# Patient Record
Sex: MALE | Race: WHITE | NOT HISPANIC OR LATINO
[De-identification: names, ages, dates, MRNs, and addresses within clinical notes are randomized per-mention and may not be internally consistent; named-entity substitution may affect disease eponyms.]

---

## 2020-02-27 ENCOUNTER — RESULT CHARGE (OUTPATIENT)
Age: 80
End: 2020-02-27

## 2020-02-27 ENCOUNTER — APPOINTMENT (OUTPATIENT)
Dept: UROLOGY | Facility: CLINIC | Age: 80
End: 2020-02-27
Payer: MEDICARE

## 2020-02-27 VITALS — BODY MASS INDEX: 34.07 KG/M2 | HEIGHT: 69 IN | WEIGHT: 230 LBS

## 2020-02-27 DIAGNOSIS — E78.5 HYPERLIPIDEMIA, UNSPECIFIED: ICD-10-CM

## 2020-02-27 DIAGNOSIS — Z82.3 FAMILY HISTORY OF STROKE: ICD-10-CM

## 2020-02-27 DIAGNOSIS — Z80.6 FAMILY HISTORY OF LEUKEMIA: ICD-10-CM

## 2020-02-27 DIAGNOSIS — I10 ESSENTIAL (PRIMARY) HYPERTENSION: ICD-10-CM

## 2020-02-27 DIAGNOSIS — Z78.9 OTHER SPECIFIED HEALTH STATUS: ICD-10-CM

## 2020-02-27 DIAGNOSIS — G40.909 EPILEPSY, UNSPECIFIED, NOT INTRACTABLE, W/OUT STATUS EPILEPTICUS: ICD-10-CM

## 2020-02-27 LAB
BILIRUB UR QL STRIP: NORMAL
CLARITY UR: CLEAR
COLLECTION METHOD: NORMAL
GLUCOSE UR-MCNC: NORMAL
HCG UR QL: NORMAL EU/DL
HGB UR QL STRIP.AUTO: NORMAL
KETONES UR-MCNC: NORMAL
LEUKOCYTE ESTERASE UR QL STRIP: NORMAL
NITRITE UR QL STRIP: NORMAL
PH UR STRIP: 5
PROT UR STRIP-MCNC: NORMAL
SP GR UR STRIP: 1.01

## 2020-02-27 PROCEDURE — 99204 OFFICE O/P NEW MOD 45 MIN: CPT

## 2020-02-27 RX ORDER — FUROSEMIDE 20 MG/1
20 TABLET ORAL
Refills: 0 | Status: ACTIVE | COMMUNITY

## 2020-05-06 ENCOUNTER — APPOINTMENT (OUTPATIENT)
Dept: UROLOGY | Facility: CLINIC | Age: 80
End: 2020-05-06

## 2020-06-03 ENCOUNTER — OUTPATIENT (OUTPATIENT)
Dept: OUTPATIENT SERVICES | Facility: HOSPITAL | Age: 80
LOS: 1 days | Discharge: HOME | End: 2020-06-03
Payer: MEDICARE

## 2020-06-03 VITALS
TEMPERATURE: 98 F | HEART RATE: 79 BPM | SYSTOLIC BLOOD PRESSURE: 145 MMHG | RESPIRATION RATE: 15 BRPM | DIASTOLIC BLOOD PRESSURE: 67 MMHG | WEIGHT: 232.37 LBS | HEIGHT: 67 IN | OXYGEN SATURATION: 97 %

## 2020-06-03 DIAGNOSIS — Z95.5 PRESENCE OF CORONARY ANGIOPLASTY IMPLANT AND GRAFT: Chronic | ICD-10-CM

## 2020-06-03 DIAGNOSIS — Z01.818 ENCOUNTER FOR OTHER PREPROCEDURAL EXAMINATION: ICD-10-CM

## 2020-06-03 LAB
A1C WITH ESTIMATED AVERAGE GLUCOSE RESULT: 6.2 % — HIGH (ref 4–5.6)
ALBUMIN SERPL ELPH-MCNC: 4.7 G/DL — SIGNIFICANT CHANGE UP (ref 3.5–5.2)
ALP SERPL-CCNC: 77 U/L — SIGNIFICANT CHANGE UP (ref 30–115)
ALT FLD-CCNC: 26 U/L — SIGNIFICANT CHANGE UP (ref 0–41)
ANION GAP SERPL CALC-SCNC: 13 MMOL/L — SIGNIFICANT CHANGE UP (ref 7–14)
APTT BLD: 30.6 SEC — SIGNIFICANT CHANGE UP (ref 27–39.2)
AST SERPL-CCNC: 24 U/L — SIGNIFICANT CHANGE UP (ref 0–41)
BASOPHILS # BLD AUTO: 0.06 K/UL — SIGNIFICANT CHANGE UP (ref 0–0.2)
BASOPHILS NFR BLD AUTO: 1.1 % — HIGH (ref 0–1)
BILIRUB SERPL-MCNC: 0.6 MG/DL — SIGNIFICANT CHANGE UP (ref 0.2–1.2)
BUN SERPL-MCNC: 25 MG/DL — HIGH (ref 10–20)
CALCIUM SERPL-MCNC: 9.2 MG/DL — SIGNIFICANT CHANGE UP (ref 8.5–10.1)
CHLORIDE SERPL-SCNC: 103 MMOL/L — SIGNIFICANT CHANGE UP (ref 98–110)
CO2 SERPL-SCNC: 27 MMOL/L — SIGNIFICANT CHANGE UP (ref 17–32)
CREAT SERPL-MCNC: 1.5 MG/DL — SIGNIFICANT CHANGE UP (ref 0.7–1.5)
EOSINOPHIL # BLD AUTO: 0.38 K/UL — SIGNIFICANT CHANGE UP (ref 0–0.7)
EOSINOPHIL NFR BLD AUTO: 6.7 % — SIGNIFICANT CHANGE UP (ref 0–8)
ESTIMATED AVERAGE GLUCOSE: 131 MG/DL — HIGH (ref 68–114)
GLUCOSE SERPL-MCNC: 186 MG/DL — HIGH (ref 70–99)
HCT VFR BLD CALC: 46.3 % — SIGNIFICANT CHANGE UP (ref 42–52)
HGB BLD-MCNC: 15.2 G/DL — SIGNIFICANT CHANGE UP (ref 14–18)
IMM GRANULOCYTES NFR BLD AUTO: 0.5 % — HIGH (ref 0.1–0.3)
INR BLD: 1.09 RATIO — SIGNIFICANT CHANGE UP (ref 0.65–1.3)
LYMPHOCYTES # BLD AUTO: 1.26 K/UL — SIGNIFICANT CHANGE UP (ref 1.2–3.4)
LYMPHOCYTES # BLD AUTO: 22.3 % — SIGNIFICANT CHANGE UP (ref 20.5–51.1)
MCHC RBC-ENTMCNC: 31.1 PG — HIGH (ref 27–31)
MCHC RBC-ENTMCNC: 32.8 G/DL — SIGNIFICANT CHANGE UP (ref 32–37)
MCV RBC AUTO: 94.9 FL — HIGH (ref 80–94)
MONOCYTES # BLD AUTO: 0.4 K/UL — SIGNIFICANT CHANGE UP (ref 0.1–0.6)
MONOCYTES NFR BLD AUTO: 7.1 % — SIGNIFICANT CHANGE UP (ref 1.7–9.3)
NEUTROPHILS # BLD AUTO: 3.51 K/UL — SIGNIFICANT CHANGE UP (ref 1.4–6.5)
NEUTROPHILS NFR BLD AUTO: 62.3 % — SIGNIFICANT CHANGE UP (ref 42.2–75.2)
NRBC # BLD: 0 /100 WBCS — SIGNIFICANT CHANGE UP (ref 0–0)
PLATELET # BLD AUTO: 90 K/UL — LOW (ref 130–400)
POTASSIUM SERPL-MCNC: 4.3 MMOL/L — SIGNIFICANT CHANGE UP (ref 3.5–5)
POTASSIUM SERPL-SCNC: 4.3 MMOL/L — SIGNIFICANT CHANGE UP (ref 3.5–5)
PROT SERPL-MCNC: 6.9 G/DL — SIGNIFICANT CHANGE UP (ref 6–8)
PROTHROM AB SERPL-ACNC: 12.5 SEC — SIGNIFICANT CHANGE UP (ref 9.95–12.87)
RBC # BLD: 4.88 M/UL — SIGNIFICANT CHANGE UP (ref 4.7–6.1)
RBC # FLD: 13.1 % — SIGNIFICANT CHANGE UP (ref 11.5–14.5)
SODIUM SERPL-SCNC: 143 MMOL/L — SIGNIFICANT CHANGE UP (ref 135–146)
WBC # BLD: 5.64 K/UL — SIGNIFICANT CHANGE UP (ref 4.8–10.8)
WBC # FLD AUTO: 5.64 K/UL — SIGNIFICANT CHANGE UP (ref 4.8–10.8)

## 2020-06-03 PROCEDURE — 93010 ELECTROCARDIOGRAM REPORT: CPT

## 2020-06-03 NOTE — H&P PST ADULT - RS GEN PE MLT RESP DETAILS PC
airway patent/clear to auscultation bilaterally/no chest wall tenderness/breath sounds equal/normal/respirations non-labored/good air movement

## 2020-06-03 NOTE — H&P PST ADULT - NSICDXPASTMEDICALHX_GEN_ALL_CORE_FT
PAST MEDICAL HISTORY:  Cataract     DM (diabetes mellitus)     GAURAV on CPAP PAST MEDICAL HISTORY:  Cataract     DM (diabetes mellitus)     HTN (hypertension)     GAURAV on CPAP     Seizure x1  2/2017

## 2020-06-03 NOTE — H&P PST ADULT - REASON FOR ADMISSION
PT PRESENTS TO PAST WITH NO SOB, CP, PALPITATIONS, DYSURIA, UTI OR URI AT PRESENT.   PT ABLE TO WALK UP 2-3 FLIGHTS OF STEPS WITH NO SOB.  AS PER THE PT, THIS IS HIS/HER COMPLETE MEDICAL AND SURGICAL HX, INCLUDING MEDICATIONS PRESCRIBED AND OVER THE COUNTER  PT SCHEDULED FOR A LEFT HEART CATHETERIZATION.  PT STATES- I HAD AN ABNORMAL EKG. PT PRESENTS TO PAST WITH NO SOB, PALPITATIONS, DYSURIA, UTI OR URI AT PRESENT.   PT ABLE TO WALK UP 2-3 FLIGHTS OF STEPS WITH NO SOB.  AS PER THE PT, he has occasional chest pressure on walking up hill.  Had stress test with multiple segment reversible defect and scheduled for cardiac cath.  Had LAD stent in 2015.THIS IS HIS/HER COMPLETE MEDICAL AND SURGICAL HX, INCLUDING MEDICATIONS PRESCRIBED AND OVER THE COUNTER  PT SCHEDULED FOR A LEFT HEART CATHETERIZATION.  PT STATES- I HAD AN ABNORMAL EKG.

## 2020-06-03 NOTE — H&P PST ADULT - ATTENDING COMMENTS
Chart reviewed, pt examined.  Pt with CAD and CCS 2 angina and intermediate risk stress MPI on 2 anti-ischemic meds.   For cath / possible PCI.  risks, benefits and alternatives discussed.  Pt voices understanding and agrees.

## 2020-06-17 ENCOUNTER — OUTPATIENT (OUTPATIENT)
Dept: OUTPATIENT SERVICES | Facility: HOSPITAL | Age: 80
LOS: 1 days | Discharge: HOME | End: 2020-06-17
Payer: MEDICARE

## 2020-06-17 VITALS — HEIGHT: 67 IN | WEIGHT: 231.93 LBS

## 2020-06-17 DIAGNOSIS — Z95.5 PRESENCE OF CORONARY ANGIOPLASTY IMPLANT AND GRAFT: Chronic | ICD-10-CM

## 2020-06-17 LAB
ANION GAP SERPL CALC-SCNC: 16 MMOL/L — HIGH (ref 7–14)
BUN SERPL-MCNC: 25 MG/DL — HIGH (ref 10–20)
CALCIUM SERPL-MCNC: 9 MG/DL — SIGNIFICANT CHANGE UP (ref 8.5–10.1)
CHLORIDE SERPL-SCNC: 103 MMOL/L — SIGNIFICANT CHANGE UP (ref 98–110)
CO2 SERPL-SCNC: 25 MMOL/L — SIGNIFICANT CHANGE UP (ref 17–32)
CREAT SERPL-MCNC: 1.4 MG/DL — SIGNIFICANT CHANGE UP (ref 0.7–1.5)
GLUCOSE BLDC GLUCOMTR-MCNC: 105 MG/DL — HIGH (ref 70–99)
GLUCOSE SERPL-MCNC: 163 MG/DL — HIGH (ref 70–99)
HCT VFR BLD CALC: 46.2 % — SIGNIFICANT CHANGE UP (ref 42–52)
HGB BLD-MCNC: 15.2 G/DL — SIGNIFICANT CHANGE UP (ref 14–18)
MCHC RBC-ENTMCNC: 31.1 PG — HIGH (ref 27–31)
MCHC RBC-ENTMCNC: 32.9 G/DL — SIGNIFICANT CHANGE UP (ref 32–37)
MCV RBC AUTO: 94.5 FL — HIGH (ref 80–94)
NRBC # BLD: 0 /100 WBCS — SIGNIFICANT CHANGE UP (ref 0–0)
PLATELET # BLD AUTO: 84 K/UL — LOW (ref 130–400)
POTASSIUM SERPL-MCNC: 4.2 MMOL/L — SIGNIFICANT CHANGE UP (ref 3.5–5)
POTASSIUM SERPL-SCNC: 4.2 MMOL/L — SIGNIFICANT CHANGE UP (ref 3.5–5)
RBC # BLD: 4.89 M/UL — SIGNIFICANT CHANGE UP (ref 4.7–6.1)
RBC # FLD: 13 % — SIGNIFICANT CHANGE UP (ref 11.5–14.5)
SODIUM SERPL-SCNC: 144 MMOL/L — SIGNIFICANT CHANGE UP (ref 135–146)
WBC # BLD: 5.22 K/UL — SIGNIFICANT CHANGE UP (ref 4.8–10.8)
WBC # FLD AUTO: 5.22 K/UL — SIGNIFICANT CHANGE UP (ref 4.8–10.8)

## 2020-06-17 PROCEDURE — 93010 ELECTROCARDIOGRAM REPORT: CPT

## 2020-06-17 RX ORDER — ATORVASTATIN CALCIUM 80 MG/1
1 TABLET, FILM COATED ORAL
Qty: 30 | Refills: 1
Start: 2020-06-17 | End: 2020-08-15

## 2020-06-17 RX ORDER — CLOPIDOGREL BISULFATE 75 MG/1
1 TABLET, FILM COATED ORAL
Qty: 0 | Refills: 0 | DISCHARGE

## 2020-06-17 RX ORDER — SIMVASTATIN 20 MG/1
1 TABLET, FILM COATED ORAL
Qty: 0 | Refills: 0 | DISCHARGE

## 2020-06-17 RX ORDER — TICAGRELOR 90 MG/1
1 TABLET ORAL
Qty: 60 | Refills: 1
Start: 2020-06-17 | End: 2020-08-15

## 2020-06-17 NOTE — PROGRESS NOTE ADULT - SUBJECTIVE AND OBJECTIVE BOX
Cardiology Follow up    RAHAT MARTINEZ   80y Male  PAST MEDICAL & SURGICAL HISTORY:  Seizure: x1  2/2017  HTN (hypertension)  Cataract  DM (diabetes mellitus)  GAURAV on CPAP  H/O heart artery stent: x1       HPI:    Allergies    No Known Allergies    Intolerances      Patient without complaints.  Denies CP, SOB, palpitations, or dizziness  No events on telemetry     REVIEW OF SYSTEMS:          CONSTITUTIONAL: No weakness, fevers or chills          EYES/ENT: No visual changes;  No vertigo or throat pain           NECK: No pain or stiffness          RESPIRATORY: No cough, wheezing, hemoptysis          CARDIOVASCULAR: no pain, no COLBY, no palpitations           GASTROINTESTINAL: No abdominal or epigastric pain. No nausea, vomiting, or hematemesis;           GENITOURINARY: No dysuria, frequency or hematuria          NEUROLOGICAL: No numbness or weakness          SKIN: No itching, rashes    PHYSICAL EXAM:           CONSTITUTIONAL: Well-developed; well-nourished; in no acute distress  	SKIN: warm, dry  	HEAD: Normocephalic; atraumatic  	EYES: PERRL.  	ENT: No nasal discharge, airway clear, mucous membranes moist  	NECK: Supple; non tender.  	CARD: +S1, +S2, no murmurs, gallops, or rubs. (Regular) rate and rhythm    	RESP: No wheezes, rales or rhonchi. CTA B/L  	ABD: soft ntnd, + BS x 4 quadrants  	EXT: moves all extremities,  no clubbing, cyanosis or edema  	NEURO: Alert and oriented x3, no focal deficits          PSYCH: Cooperative, appropriate          VASCULAR:  + Rad / + PTs / + DPs          EXTREMITY:  	   Left Radial: D-stat in place, + pulses, , access site soft, no hematoma, no pain, no numbness, no signs and symptoms of infection             ECG: P @ 1700                                                                                                                LABS: P @ 1700      A/P:  I discussed the case with Cardiologist Dr. Mcmahon and recommend the following:    S/P PCI dLCX  	     Continue DAPT (asa 81mg daily, brilinta 90 mg q12),  B-Blocker, Statin Therapy                   pt instructed to stop plavix and will be replaced with brilinta 90mg q12                   brilinta coverage confirmed, $47/month, pt aware and agreeable, RX available for pick-up                   monitor access site                    CBC/BMP/EKG @ 1700                   NS 150cc/hr x 6 hrs                    Patient agreeing to take DAPT for at least one year or as directed by cardiologist                    Pt given instructions on importance of taking antiplatelet medication or risk acute stent thrombosis/death                   Post cath instructions, access site care and activity restrictions reviewed with patient                     Discussed with patient to return to hospital if experience chest pain, shortness breath, dizziness and site bleeding                   Aggressive risk factor modification, diet counseling, smoking cessation discussed with patient                       Can discharge patient from cardiac standpoint @ 1900 if labs/ekg/site wnl and ambulating without symptoms                    Follow up with Cardiology Dr. Mcmahon  in 1-2 weeks.  Instructed to call and make an appointment Cardiology Follow up    RAHAT MARTINEZ   80y Male  PAST MEDICAL & SURGICAL HISTORY:  Seizure: x1  2/2017  HTN (hypertension)  Cataract  DM (diabetes mellitus)  GAURAV on CPAP  H/O heart artery stent: x1       HPI:    Allergies    No Known Allergies    Intolerances      Patient without complaints.  Denies CP, SOB, palpitations, or dizziness  No events on telemetry     REVIEW OF SYSTEMS:          CONSTITUTIONAL: No weakness, fevers or chills          EYES/ENT: No visual changes;  No vertigo or throat pain           NECK: No pain or stiffness          RESPIRATORY: No cough, wheezing, hemoptysis          CARDIOVASCULAR: no pain, no COLBY, no palpitations           GASTROINTESTINAL: No abdominal or epigastric pain. No nausea, vomiting, or hematemesis;           GENITOURINARY: No dysuria, frequency or hematuria          NEUROLOGICAL: No numbness or weakness          SKIN: No itching, rashes    PHYSICAL EXAM:           CONSTITUTIONAL: Well-developed; well-nourished; in no acute distress  	SKIN: warm, dry  	HEAD: Normocephalic; atraumatic  	EYES: PERRL.  	ENT: No nasal discharge, airway clear, mucous membranes moist  	NECK: Supple; non tender.  	CARD: +S1, +S2, no murmurs, gallops, or rubs. (Regular) rate and rhythm    	RESP: No wheezes, rales or rhonchi. CTA B/L  	ABD: soft ntnd, + BS x 4 quadrants  	EXT: moves all extremities,  no clubbing, cyanosis or edema  	NEURO: Alert and oriented x3, no focal deficits          PSYCH: Cooperative, appropriate          VASCULAR:  + Rad / + PTs / + DPs          EXTREMITY:  	   Left Radial: D-stat in place, + pulses, , access site soft, no hematoma, no pain, no numbness, no signs and symptoms of infection             ECG: P @ 1700                                                                                                                LABS: P @ 1700      A/P:  I discussed the case with Cardiologist Dr. Mcmahon and recommend the following:    S/P PCI dLCX  	     Continue DAPT (asa 81mg daily, brilinta 90 mg q12),  B-Blocker, Statin Therapy                   no ACE due to elevated Cr                   pt instructed to stop plavix and will be replaced with brilinta 90mg q12                   brilinta coverage confirmed, $47/month, pt aware and agreeable, RX available for pick-up                   monitor access site                    CBC/BMP/EKG @ 1700                   NS 150cc/hr x 6 hrs                    Patient agreeing to take DAPT for at least one year or as directed by cardiologist                    Pt given instructions on importance of taking antiplatelet medication or risk acute stent thrombosis/death                   Post cath instructions, access site care and activity restrictions reviewed with patient                     Discussed with patient to return to hospital if experience chest pain, shortness breath, dizziness and site bleeding                   Aggressive risk factor modification, diet counseling, smoking cessation discussed with patient                       Can discharge patient from cardiac standpoint @ 1900 if labs/ekg/site wnl and ambulating without symptoms                    Follow up with Cardiology Dr. Mcmahon  in 1-2 weeks.  Instructed to call and make an appointment Cardiology Follow up    RAHAT MARTINEZ   80y Male  PAST MEDICAL & SURGICAL HISTORY:  Seizure: x1  2/2017  HTN (hypertension)  Cataract  DM (diabetes mellitus)  GAURAV on CPAP  H/O heart artery stent: x1       HPI:    Allergies    No Known Allergies    Intolerances      Patient without complaints.  Denies CP, SOB, palpitations, or dizziness  No events on telemetry     REVIEW OF SYSTEMS:          CONSTITUTIONAL: No weakness, fevers or chills          EYES/ENT: No visual changes;  No vertigo or throat pain           NECK: No pain or stiffness          RESPIRATORY: No cough, wheezing, hemoptysis          CARDIOVASCULAR: no pain, no COLBY, no palpitations           GASTROINTESTINAL: No abdominal or epigastric pain. No nausea, vomiting, or hematemesis;           GENITOURINARY: No dysuria, frequency or hematuria          NEUROLOGICAL: No numbness or weakness          SKIN: No itching, rashes    PHYSICAL EXAM:           CONSTITUTIONAL: Well-developed; well-nourished; in no acute distress  	SKIN: warm, dry  	HEAD: Normocephalic; atraumatic  	EYES: PERRL.  	ENT: No nasal discharge, airway clear, mucous membranes moist  	NECK: Supple; non tender.  	CARD: +S1, +S2, no murmurs, gallops, or rubs. (Regular) rate and rhythm    	RESP: No wheezes, rales or rhonchi. CTA B/L  	ABD: soft ntnd, + BS x 4 quadrants  	EXT: moves all extremities,  no clubbing, cyanosis or edema  	NEURO: Alert and oriented x3, no focal deficits          PSYCH: Cooperative, appropriate          VASCULAR:  + Rad / + PTs / + DPs          EXTREMITY:  	   Left Radial: D-stat in place, + pulses, , access site soft, no hematoma, no pain, no numbness, no signs and symptoms of infection             ECG: P @ 1700                                                                                                                LABS: P @ 1700      A/P:  I discussed the case with Cardiologist Dr. Mcmahon and recommend the following:    S/P PCI dLCX  	     Continue DAPT (asa 81mg daily, brilinta 90 mg q12),  B-Blocker, Statin Therapy                   no ACE due to elevated Cr, reevaluate as outpt                   pt instructed to stop plavix and will be replaced with brilinta 90mg q12                   brilinta coverage confirmed, $47/month, pt aware and agreeable, RX available for pick-up                   monitor access site                    CBC/BMP/EKG @ 1700                   NS 150cc/hr x 6 hrs                    Patient agreeing to take DAPT for at least one year or as directed by cardiologist                    Pt given instructions on importance of taking antiplatelet medication or risk acute stent thrombosis/death                   Post cath instructions, access site care and activity restrictions reviewed with patient                     Discussed with patient to return to hospital if experience chest pain, shortness breath, dizziness and site bleeding                   Aggressive risk factor modification, diet counseling, smoking cessation discussed with patient                       Can discharge patient from cardiac standpoint @ 1900 if labs/ekg/site wnl and ambulating without symptoms                    Follow up with Cardiology Dr. Mcmahon  in 1-2 weeks.  Instructed to call and make an appointment Cardiology Follow up    RAHAT MARTINEZ   80y Male with CCS 2 angin and intermediate risk stress test, s/p cath and PCI of distal Circumflex.  PAST MEDICAL & SURGICAL HISTORY:  Seizure: x1  2/2017  HTN (hypertension)  Cataract  DM (diabetes mellitus)  GAURAV on CPAP  H/O heart artery stent: x1       HPI:    Allergies    No Known Allergies    Intolerances      Patient without complaints.  Denies CP, SOB, palpitations, or dizziness  No events on telemetry     REVIEW OF SYSTEMS:          CONSTITUTIONAL: No weakness, fevers or chills          EYES/ENT: No visual changes;  No vertigo or throat pain           NECK: No pain or stiffness          RESPIRATORY: No cough, wheezing, hemoptysis          CARDIOVASCULAR: no pain, no COLBY, no palpitations           GASTROINTESTINAL: No abdominal or epigastric pain. No nausea, vomiting, or hematemesis;           GENITOURINARY: No dysuria, frequency or hematuria          NEUROLOGICAL: No numbness or weakness          SKIN: No itching, rashes    PHYSICAL EXAM:           CONSTITUTIONAL: Well-developed; well-nourished; in no acute distress  	SKIN: warm, dry  	HEAD: Normocephalic; atraumatic  	EYES: PERRL.  	ENT: No nasal discharge, airway clear, mucous membranes moist  	NECK: Supple; non tender.  	CARD: +S1, +S2, no murmurs, gallops, or rubs. (Regular) rate and rhythm    	RESP: No wheezes, rales or rhonchi. CTA B/L  	ABD: soft ntnd, + BS x 4 quadrants  	EXT: moves all extremities,  no clubbing, cyanosis or edema  	NEURO: Alert and oriented x3, no focal deficits          PSYCH: Cooperative, appropriate          VASCULAR:  + Rad / + PTs / + DPs          EXTREMITY:  	   Left Radial: D-stat in place, + pulses, , access site soft, no hematoma, no pain, no numbness, no signs and symptoms of infection             ECG: P @ 1700                                                                                                                LABS: P @ 1700      A/P:  I discussed the case with Cardiologist Dr. Mcmahon and recommend the following:    S/P PCI dLCX  	     Continue DAPT (asa 81mg daily, brilinta 90 mg q12),  B-Blocker, Statin Therapy                   no ACE due to elevated Cr, reevaluate as outpt                   pt instructed to stop plavix and will be replaced with brilinta 90mg q12                   brilinta coverage confirmed, $47/month, pt aware and agreeable, RX available for pick-up                   monitor access site                    CBC/BMP/EKG @ 1700                   NS 150cc/hr x 6 hrs                    Patient agreeing to take DAPT for at least one year or as directed by cardiologist                    Pt given instructions on importance of taking antiplatelet medication or risk acute stent thrombosis/death                   Post cath instructions, access site care and activity restrictions reviewed with patient                     Discussed with patient to return to hospital if experience chest pain, shortness breath, dizziness and site bleeding                   Aggressive risk factor modification, diet counseling, smoking cessation discussed with patient                       Can discharge patient from cardiac standpoint @ 1900 if labs/ekg/site wnl and ambulating without symptoms                    Follow up with Cardiology Dr. Murphy  in 1-2 weeks.  Instructed to call and make an appointment

## 2020-06-17 NOTE — CHART NOTE - NSCHARTNOTEFT_GEN_A_CORE
Preliminary Cardiac Catheterization Post-Procedure Report:06-17-20 @ 13:27    Procedure Performed:  [x] Left Heart Catheterization  [ ] Right Heart Catheterization  [x] Percutaneous Coronary Intervention    Primary Physician: Dr. Salome MD  Assistant(s): Dr. Hayes MD and Dr. Chas MD    Preliminary Procedure Summary (Official full report to follow)    Pre-procedure diagnosis: Stable Angina, Abnormal Stress test  Post Procedure Diagnosis/Impression:    Left Heart Catheterization:  approximate EF%: normal on 2d echo  [ ] Normal Coronary Arteries  [ ] Luminal Irregularities  [ ] non-obstructive CAD  [ ] ** vessel coronary artery disease       Anesthesia Type  [x] conscious sedation  [x] local/regional anesthesia  [  ] general anesthesia    Estimated Blood Loss  [x ] less than 30 ml    Amount of Contrast used: 200 ml (Visipaque)    Access  [ ] Rt. Femoral A  [ ] Rt. Femoral V  [ ] Rt. Radial A  [ ] Rt. Brachial V  [x] Lt. Radial A (D-stat)    Condition of patient after procedure  [x] stable  [  ] guarded  [  ] satisfactory     CATH SUMMARY/FINDINGS:    Dominance:   [ ] Right  [x] Left                  LM:     large, minor irregularities    LAD:                        prox LAD: 50% stenosis, patent stent  mid LAD: moderate atherosclerosis  distal LAD: small vessel, 50% stenosis near the origin of D2    Diag:   D1: moderate atherosclerosis  D2: minor irregularities    LCx: large dominant, mildly tortuous  prox LCX: 40% eccentric, ulcerated lesion  distal LCX: 95% tubular stenosis, culprit for abnormal stress test, s/p PCI    OM1: mild atherosclerosis  OM2: normal  OM3: normal    RCA: small, non-dominant, mild atherosclerosis    LPDA: normal    Implants: PCI with balloon angioplasty and MEHDI x 1 (4.0x18mm [place]) to distal LCX    Follow-up Care:  [x] D/C Home today  [x] ASA 81mg, Toprol 25mg XL, and start Brilinta 90mg Q12, and lipitor 40mg QHS  [x ] intensive medical management  Monitor BUN/Cr  NS@150cc/hr x 6 hrs  CMP/CBC in 4-6 hours  F/U with Cardiologist as outpatient Preliminary Cardiac Catheterization Post-Procedure Report:06-17-20 @ 13:27    Procedure Performed:  [x] Left Heart Catheterization  [x] Percutaneous Coronary Intervention    Primary Physician: Dr. Alan Mcmahon MD  Assistant(s): Dr. Hayes MD and Dr. Chas MD    Preliminary Procedure Summary (Official full report to follow)    Pre-procedure diagnosis: Stable Angina, Abnormal Stress test  Post Procedure Diagnosis/Impression:    Left Heart Catheterization:  approximate EF%: normal on echo  [2 ]  vessel coronary artery disease       Anesthesia Type  [x] conscious sedation  [x] local/regional anesthesia    Estimated Blood Loss  [x ] less than 10 ml    Amount of Contrast used: 200 ml (Visipaque)    Access    [x] Lt. Radial A (Hemostasis achieved with D-stat)    Condition of patient after procedure  [x] stable    CATH SUMMARY/FINDINGS:    Dominance:   [ ] Right  [x] Left                  LM:     large, mild disease    LAD:                        prox LAD: 50% ostial stenosis, patent proximal stent  mid LAD: moderate atherosclerosis  distal LAD: small vessel, 60% stenosis at  the origin of D2    Diag:   D1: moderate atherosclerosis  D2: minor irregularities    LCx: large dominant, mildly tortuous  prox LCX: 40% eccentric,  lesion  distal LCX: 95% tubular stenosis, culprit for abnormal stress test,    OM1: large, mild atherosclerosis  OM2: small, normal  OM3: normal, medium size    RCA: small, non-dominant, mild atherosclerosis    LPDA: small, mild disease.    Implants: PCI with balloon angioplasty and MEHDI x 1 (4.0x18mm [place]) to distal LCX, post dilated with 4.0/15 NC at 16 [place].  90 -->0%    No complications.    Follow-up Care:  [x] D/C Home today  [x] ASA 81mg, Toprol 25mg XL, and start Brilinta 90mg Q12, and Lipitor 40mg QHS.  Discontinue Clopidogrel.  [x ] intensive medical management  Monitor BUN/Cr  NS@150cc/hr x 6 hrs  CMP/CBC in 4-6 hours  F/U with Dr Murphy as outpatient in 1 week.

## 2020-06-23 DIAGNOSIS — Z79.02 LONG TERM (CURRENT) USE OF ANTITHROMBOTICS/ANTIPLATELETS: ICD-10-CM

## 2020-06-23 DIAGNOSIS — Z95.5 PRESENCE OF CORONARY ANGIOPLASTY IMPLANT AND GRAFT: ICD-10-CM

## 2020-06-23 DIAGNOSIS — Z87.891 PERSONAL HISTORY OF NICOTINE DEPENDENCE: ICD-10-CM

## 2020-06-23 DIAGNOSIS — G47.33 OBSTRUCTIVE SLEEP APNEA (ADULT) (PEDIATRIC): ICD-10-CM

## 2020-06-23 DIAGNOSIS — N19 UNSPECIFIED KIDNEY FAILURE: ICD-10-CM

## 2020-06-23 DIAGNOSIS — I10 ESSENTIAL (PRIMARY) HYPERTENSION: ICD-10-CM

## 2020-06-23 DIAGNOSIS — R94.39 ABNORMAL RESULT OF OTHER CARDIOVASCULAR FUNCTION STUDY: ICD-10-CM

## 2020-06-23 DIAGNOSIS — R56.9 UNSPECIFIED CONVULSIONS: ICD-10-CM

## 2020-06-23 DIAGNOSIS — I25.118 ATHEROSCLEROTIC HEART DISEASE OF NATIVE CORONARY ARTERY WITH OTHER FORMS OF ANGINA PECTORIS: ICD-10-CM

## 2020-06-23 DIAGNOSIS — E11.9 TYPE 2 DIABETES MELLITUS WITHOUT COMPLICATIONS: ICD-10-CM

## 2020-08-18 ENCOUNTER — APPOINTMENT (OUTPATIENT)
Dept: UROLOGY | Facility: CLINIC | Age: 80
End: 2020-08-18
Payer: MEDICARE

## 2020-08-18 VITALS — HEIGHT: 69 IN | WEIGHT: 230 LBS | TEMPERATURE: 98.3 F | BODY MASS INDEX: 34.07 KG/M2

## 2020-08-18 DIAGNOSIS — N20.0 CALCULUS OF KIDNEY: ICD-10-CM

## 2020-08-18 PROBLEM — I10 ESSENTIAL (PRIMARY) HYPERTENSION: Chronic | Status: ACTIVE | Noted: 2020-06-03

## 2020-08-18 PROBLEM — H26.9 UNSPECIFIED CATARACT: Chronic | Status: ACTIVE | Noted: 2020-06-03

## 2020-08-18 PROBLEM — G47.33 OBSTRUCTIVE SLEEP APNEA (ADULT) (PEDIATRIC): Chronic | Status: ACTIVE | Noted: 2020-06-03

## 2020-08-18 PROBLEM — R56.9 UNSPECIFIED CONVULSIONS: Chronic | Status: ACTIVE | Noted: 2020-06-03

## 2020-08-18 PROBLEM — E11.9 TYPE 2 DIABETES MELLITUS WITHOUT COMPLICATIONS: Chronic | Status: ACTIVE | Noted: 2020-06-03

## 2020-08-18 PROCEDURE — 81003 URINALYSIS AUTO W/O SCOPE: CPT | Mod: QW

## 2020-08-18 PROCEDURE — 99213 OFFICE O/P EST LOW 20 MIN: CPT

## 2020-08-18 RX ORDER — CLOPIDOGREL BISULFATE 75 MG/1
75 TABLET, FILM COATED ORAL
Refills: 0 | Status: DISCONTINUED | COMMUNITY
End: 2020-08-18

## 2020-08-18 RX ORDER — ALLOPURINOL 300 MG/1
300 TABLET ORAL
Qty: 90 | Refills: 0 | Status: ACTIVE | COMMUNITY
Start: 2020-08-16

## 2020-08-18 RX ORDER — AMLODIPINE BESYLATE 2.5 MG/1
2.5 TABLET ORAL
Qty: 90 | Refills: 0 | Status: ACTIVE | COMMUNITY
Start: 2020-06-30

## 2020-08-18 NOTE — PHYSICAL EXAM
[General Appearance - Well Developed] : well developed [General Appearance - Well Nourished] : well nourished [Normal Appearance] : normal appearance [Well Groomed] : well groomed [General Appearance - In No Acute Distress] : no acute distress [Abdomen Soft] : soft [Abdomen Tenderness] : non-tender [Costovertebral Angle Tenderness] : no ~M costovertebral angle tenderness [Urethral Meatus] : meatus normal [Urinary Bladder Findings] : the bladder was normal on palpation [Scrotum] : the scrotum was normal [Testes Mass (___cm)] : there were no testicular masses [No Prostate Nodules] : no prostate nodules [Prostate Tenderness] : the prostate was not tender [Prostate Size ___ gm] : prostate size [unfilled] gm [Skin Color & Pigmentation] : normal skin color and pigmentation [] : no respiratory distress [Edema] : no peripheral edema [Respiration, Rhythm And Depth] : normal respiratory rhythm and effort [Exaggerated Use Of Accessory Muscles For Inspiration] : no accessory muscle use [Oriented To Time, Place, And Person] : oriented to person, place, and time [Mood] : the mood was normal [Affect] : the affect was normal [Not Anxious] : not anxious [No Focal Deficits] : no focal deficits [Normal Station and Gait] : the gait and station were normal for the patient's age [No Palpable Adenopathy] : no palpable adenopathy [FreeTextEntry1] : sulcus ++ -- cannot reach base

## 2020-08-18 NOTE — ASSESSMENT
[FreeTextEntry1] : #1. Elevated PSA\par #2. BPH, clinically\par #3. Nephrolithiasis--\par \par Plan\par Psa 6 months \par Strongly advised TRUS BX/TP biopsy -- understanding and increased risk for the presence of prosthetic carcinoma based on his increasing PSA values. Patient clearly understands this. However he is anxious about telling his wife at this point. Discussion on the methodology of the prostate biopsy occurred . \par He desires repeat PSA in 6 months and then made and a decision.

## 2020-08-18 NOTE — HISTORY OF PRESENT ILLNESS
[Urinary Frequency] : urinary frequency [Nocturia] : nocturia [Post-Void Dribbling] : post-void dribbling [None] : None [FreeTextEntry1] : 79-year-old male here for initial consultation regarding history of nephrolithiasis and more recently elevated PSA== 6.1.\par New PCI + Stent -- on Brilinta \par KUB -not done\par 3/2019 PSA= 6.1 // copy of previous CT scan  Not available for review\par 6/20 psa = 8.0   %free = 20 [Straining] : no straining [Urinary Urgency] : no urinary urgency [Weak Stream] : no weak stream [Dysuria] : no dysuria [Fever] : no fever [Hematuria - Gross] : no gross hematuria [Anorexia] : no anorexia

## 2021-02-18 ENCOUNTER — APPOINTMENT (OUTPATIENT)
Dept: UROLOGY | Facility: CLINIC | Age: 81
End: 2021-02-18
Payer: MEDICARE

## 2021-02-18 ENCOUNTER — APPOINTMENT (OUTPATIENT)
Dept: UROLOGY | Facility: CLINIC | Age: 81
End: 2021-02-18

## 2021-02-18 PROCEDURE — 99448 NTRPROF PH1/NTRNET/EHR 21-30: CPT

## 2021-02-18 NOTE — PHYSICAL EXAM
[General Appearance - Well Developed] : well developed [General Appearance - Well Nourished] : well nourished [Normal Appearance] : normal appearance [Well Groomed] : well groomed [General Appearance - In No Acute Distress] : no acute distress [Abdomen Soft] : soft [Abdomen Tenderness] : non-tender [Costovertebral Angle Tenderness] : no ~M costovertebral angle tenderness [Urethral Meatus] : meatus normal [Urinary Bladder Findings] : the bladder was normal on palpation [Scrotum] : the scrotum was normal [Testes Mass (___cm)] : there were no testicular masses [No Prostate Nodules] : no prostate nodules [Prostate Tenderness] : the prostate was not tender [Prostate Size ___ gm] : prostate size [unfilled] gm [FreeTextEntry1] : sulcus ++ -- cannot reach base [Skin Color & Pigmentation] : normal skin color and pigmentation [Edema] : no peripheral edema [] : no respiratory distress [Exaggerated Use Of Accessory Muscles For Inspiration] : no accessory muscle use [Respiration, Rhythm And Depth] : normal respiratory rhythm and effort [Oriented To Time, Place, And Person] : oriented to person, place, and time [Affect] : the affect was normal [Not Anxious] : not anxious [Mood] : the mood was normal [Normal Station and Gait] : the gait and station were normal for the patient's age [No Focal Deficits] : no focal deficits [No Palpable Adenopathy] : no palpable adenopathy

## 2021-02-18 NOTE — ASSESSMENT
[FreeTextEntry1] : #1. Elevated PSA\par #2. BPH, clinically\par #3. Nephrolithiasis--\par \par Plan\par Psa 6 months \par Strongly advised TRUS BX/TP biopsy -- understanding and increased risk for the presence of prosthetic carcinoma based on his increasing PSA values. Patient clearly understands this. However he is anxious about telling his wife at this point. Discussion on the methodology of the prostate biopsy occurred . \par -psa and 4k score 6 months.\par -advise== pre-biopsy mpMRI to assess any target lesions-- pt understands but refuses any studies at this time .

## 2021-02-18 NOTE — HISTORY OF PRESENT ILLNESS
[Home] : at home, [unfilled] , at the time of the visit. [Other Location: e.g. Home (Enter Location, City,State)___] : at [unfilled] [Spouse] : spouse [Verbal consent obtained from patient] : the patient, [unfilled] [FreeTextEntry1] : 80 -year-old male here for initial consultation regarding history of nephrolithiasis and more recently elevated PSA== 6.1.\par New PCI + Stent -- on Brilinta \par Feels well. \par \par 3/2019 PSA= 6.1 // copy of previous CT scan  Not available for review\par 6/20 psa = 8.0   %free = 20\par 2/21 psa=  8.3   %fpsa = 20  [Urinary Urgency] : no urinary urgency [Urinary Frequency] : urinary frequency [Nocturia] : nocturia [Straining] : no straining [Weak Stream] : no weak stream [Post-Void Dribbling] : post-void dribbling [Dysuria] : no dysuria [Hematuria - Gross] : no gross hematuria [Fever] : no fever [Anorexia] : no anorexia [None] : None

## 2021-05-02 ENCOUNTER — OUTPATIENT (OUTPATIENT)
Dept: OUTPATIENT SERVICES | Facility: HOSPITAL | Age: 81
LOS: 1 days | Discharge: HOME | End: 2021-05-02

## 2021-05-02 DIAGNOSIS — Z95.5 PRESENCE OF CORONARY ANGIOPLASTY IMPLANT AND GRAFT: Chronic | ICD-10-CM

## 2021-05-02 DIAGNOSIS — Z11.59 ENCOUNTER FOR SCREENING FOR OTHER VIRAL DISEASES: ICD-10-CM

## 2021-05-05 ENCOUNTER — OUTPATIENT (OUTPATIENT)
Dept: OUTPATIENT SERVICES | Facility: HOSPITAL | Age: 81
LOS: 1 days | Discharge: HOME | End: 2021-05-05

## 2021-05-05 VITALS
RESPIRATION RATE: 17 BRPM | WEIGHT: 225.09 LBS | HEART RATE: 63 BPM | DIASTOLIC BLOOD PRESSURE: 67 MMHG | OXYGEN SATURATION: 96 % | TEMPERATURE: 98 F | SYSTOLIC BLOOD PRESSURE: 145 MMHG | HEIGHT: 68 IN

## 2021-05-05 VITALS — DIASTOLIC BLOOD PRESSURE: 79 MMHG | HEART RATE: 55 BPM | SYSTOLIC BLOOD PRESSURE: 139 MMHG | RESPIRATION RATE: 17 BRPM

## 2021-05-05 DIAGNOSIS — Z95.5 PRESENCE OF CORONARY ANGIOPLASTY IMPLANT AND GRAFT: Chronic | ICD-10-CM

## 2021-05-05 DIAGNOSIS — Z98.890 OTHER SPECIFIED POSTPROCEDURAL STATES: Chronic | ICD-10-CM

## 2021-05-05 DIAGNOSIS — Z90.89 ACQUIRED ABSENCE OF OTHER ORGANS: Chronic | ICD-10-CM

## 2021-05-05 LAB — GLUCOSE BLDC GLUCOMTR-MCNC: 114 MG/DL — HIGH (ref 70–99)

## 2021-05-05 RX ORDER — METOPROLOL TARTRATE 50 MG
1 TABLET ORAL
Qty: 0 | Refills: 0 | DISCHARGE

## 2021-05-05 RX ORDER — OXCARBAZEPINE 300 MG/1
10 TABLET, FILM COATED ORAL
Qty: 0 | Refills: 0 | DISCHARGE

## 2021-05-05 RX ORDER — GLIMEPIRIDE 1 MG
1 TABLET ORAL
Qty: 0 | Refills: 0 | DISCHARGE

## 2021-05-05 RX ORDER — ASPIRIN/CALCIUM CARB/MAGNESIUM 324 MG
1 TABLET ORAL
Qty: 0 | Refills: 0 | DISCHARGE

## 2021-05-05 RX ORDER — CHOLECALCIFEROL (VITAMIN D3) 125 MCG
0 CAPSULE ORAL
Qty: 0 | Refills: 0 | DISCHARGE

## 2021-05-05 RX ORDER — FUROSEMIDE 40 MG
1 TABLET ORAL
Qty: 0 | Refills: 0 | DISCHARGE

## 2021-05-05 RX ORDER — ALLOPURINOL 300 MG
1 TABLET ORAL
Qty: 0 | Refills: 0 | DISCHARGE

## 2021-05-05 RX ORDER — AMLODIPINE BESYLATE 2.5 MG/1
1 TABLET ORAL
Qty: 0 | Refills: 0 | DISCHARGE

## 2021-05-05 NOTE — CHART NOTE - NSCHARTNOTEFT_GEN_A_CORE
PACU ANESTHESIA ADMISSION NOTE      Procedure: Cataract extraction with IOL implant OS  Post op diagnosis: Cataract left eye     ____  Intubated  TV:______       Rate: ______      FiO2: ______    __x__  Patent Airway  x  ____  Full return of protective reflexes    ___x_  Full recovery from anesthesia / back to baseline status    Vitals:  T(C): 36.5 (05-05-21 @ 12:26), Max: 36.5 (05-05-21 @ 11:35)  HR: 63 (05-05-21 @ 12:26) (63 - 63)  BP: 145/67 (05-05-21 @ 12:26) (145/67 - 145/67)  RR: 17 (05-05-21 @ 12:26) (17 - 17)  SpO2: 96% (05-05-21 @ 12:26) (96% - 96%)    Mental Status:  _x___ Awake   ___x__ Alert   _____ Drowsy   _____ Sedated    Nausea/Vomiting:  ____ NO  __x____Yes,   See Post - Op Orders          Pain Scale (0-10):  _____    Treatment: ____ None    ___x_ See Post - Op/PCA Orders    Post - Operative Fluids:   ____ Oral   __x__ See Post - Op Orders    Plan: Discharge:   _x___Home       _____Floor     _____Critical Care    _____  Other:_________________    Comments: uneventful anesthesia course no complications. VItals stable. Pt transferred to PACU

## 2021-05-05 NOTE — ASU PATIENT PROFILE, ADULT - PMH
Cataract    DM (diabetes mellitus)    Gout    HTN (hypertension)    GAURAV on CPAP    Seizure  x1  2/2017

## 2021-05-05 NOTE — PRE-ANESTHESIA EVALUATION ADULT - NSANTHPMHFT_GEN_ALL_CORE
Chart reviewed, pt interviewed and examined.   CAD S/P Angioplasty and stent x2. Chart reviewed, pt interviewed and examined.   CAD S/P Angioplasty and stent x2. FS 114mg/dl.

## 2021-05-14 DIAGNOSIS — M10.9 GOUT, UNSPECIFIED: ICD-10-CM

## 2021-05-14 DIAGNOSIS — I25.10 ATHEROSCLEROTIC HEART DISEASE OF NATIVE CORONARY ARTERY WITHOUT ANGINA PECTORIS: ICD-10-CM

## 2021-05-14 DIAGNOSIS — Z99.89 DEPENDENCE ON OTHER ENABLING MACHINES AND DEVICES: ICD-10-CM

## 2021-05-14 DIAGNOSIS — E11.9 TYPE 2 DIABETES MELLITUS WITHOUT COMPLICATIONS: ICD-10-CM

## 2021-05-14 DIAGNOSIS — H25.12 AGE-RELATED NUCLEAR CATARACT, LEFT EYE: ICD-10-CM

## 2021-05-14 DIAGNOSIS — G40.909 EPILEPSY, UNSPECIFIED, NOT INTRACTABLE, WITHOUT STATUS EPILEPTICUS: ICD-10-CM

## 2021-05-14 DIAGNOSIS — Z79.82 LONG TERM (CURRENT) USE OF ASPIRIN: ICD-10-CM

## 2021-05-14 DIAGNOSIS — Z79.84 LONG TERM (CURRENT) USE OF ORAL HYPOGLYCEMIC DRUGS: ICD-10-CM

## 2021-05-14 DIAGNOSIS — H57.03 MIOSIS: ICD-10-CM

## 2021-05-14 DIAGNOSIS — G47.33 OBSTRUCTIVE SLEEP APNEA (ADULT) (PEDIATRIC): ICD-10-CM

## 2021-05-14 DIAGNOSIS — Z95.5 PRESENCE OF CORONARY ANGIOPLASTY IMPLANT AND GRAFT: ICD-10-CM

## 2021-05-14 DIAGNOSIS — I10 ESSENTIAL (PRIMARY) HYPERTENSION: ICD-10-CM

## 2021-09-14 NOTE — ASU PATIENT PROFILE, ADULT - BRAND OF COVID-19 VACCINATION
----- Message from Val Costa MD sent at 9/14/2021  3:56 PM CDT -----  Elevated PSA. Last check increased velocity of change of PSA.  Please have pt see Dr Emily Keith (urology) for evaluation and further recommendation Pfizer dose 1 and 2

## 2021-09-30 ENCOUNTER — APPOINTMENT (OUTPATIENT)
Dept: UROLOGY | Facility: CLINIC | Age: 81
End: 2021-09-30
Payer: MEDICARE

## 2021-09-30 VITALS — BODY MASS INDEX: 34.86 KG/M2 | HEIGHT: 68 IN | WEIGHT: 230 LBS

## 2021-09-30 LAB
BILIRUB UR QL STRIP: NORMAL
COLLECTION METHOD: NORMAL
GLUCOSE UR-MCNC: NORMAL
HCG UR QL: 0.2 EU/DL
HGB UR QL STRIP.AUTO: NORMAL
KETONES UR-MCNC: NORMAL
LEUKOCYTE ESTERASE UR QL STRIP: NORMAL
NITRITE UR QL STRIP: NORMAL
PH UR STRIP: 5.5
PROT UR STRIP-MCNC: NORMAL
SP GR UR STRIP: 1.02

## 2021-09-30 PROCEDURE — 99213 OFFICE O/P EST LOW 20 MIN: CPT

## 2021-10-01 PROBLEM — M10.9 GOUT, UNSPECIFIED: Chronic | Status: ACTIVE | Noted: 2021-05-05

## 2021-10-06 NOTE — ASU PREOP CHECKLIST - NS PREOP CHK TEST_COVID_DT_GEN_ALL_CORE
Quality 130: Documentation Of Current Medications In The Medical Record: Current Medications Documented Detail Level: Detailed Quality 110: Preventive Care And Screening: Influenza Immunization: Influenza Immunization Administered during Influenza season Quality 431: Preventive Care And Screening: Unhealthy Alcohol Use - Screening: Patient not identified as an unhealthy alcohol user when screened for unhealthy alcohol use using a systematic screening method 02-May-2021 13:11

## 2022-04-13 ENCOUNTER — APPOINTMENT (OUTPATIENT)
Dept: UROLOGY | Facility: CLINIC | Age: 82
End: 2022-04-13
Payer: MEDICARE

## 2022-04-13 VITALS — WEIGHT: 230 LBS | BODY MASS INDEX: 34.86 KG/M2 | HEIGHT: 68 IN

## 2022-04-13 DIAGNOSIS — Z00.00 ENCOUNTER FOR GENERAL ADULT MEDICAL EXAMINATION W/OUT ABNORMAL FINDINGS: ICD-10-CM

## 2022-04-13 PROCEDURE — 81003 URINALYSIS AUTO W/O SCOPE: CPT | Mod: QW

## 2022-04-13 PROCEDURE — 99214 OFFICE O/P EST MOD 30 MIN: CPT

## 2022-08-10 ENCOUNTER — APPOINTMENT (OUTPATIENT)
Dept: ORTHOPEDIC SURGERY | Facility: CLINIC | Age: 82
End: 2022-08-10

## 2022-08-10 PROCEDURE — 20610 DRAIN/INJ JOINT/BURSA W/O US: CPT | Mod: RT

## 2022-08-10 PROCEDURE — 99212 OFFICE O/P EST SF 10 MIN: CPT | Mod: 25

## 2022-08-10 NOTE — DISCUSSION/SUMMARY
[de-identified] : Patient has being getting cortisone injection every 4 months, I also discussed the possibility of Zilretta as nontender anti for the cortisone injection, patient would like to try it, will start the authorization process for this injection if we are able to obtain we give it to him on about December 2022.

## 2022-08-10 NOTE — PROCEDURE
[Large Joint Injection] : Large joint injection [Right] : of the right [Pain] : pain [Inflammation] : inflammation [X-ray evidence of Osteoarthritis on this or prior visit] : x-ray evidence of Osteoarthritis on this or prior visit [Alcohol] : alcohol [Betadine] : betadine [Ethyl Chloride sprayed topically] : ethyl chloride sprayed topically [Sterile technique used] : sterile technique used [___ cc    1%] : Lidocaine ~Vcc of 1%  [___ cc    4mg] : Dexamethasone (Decadron) ~Vcc of 4 mg  [] : Patient tolerated procedure well

## 2022-08-10 NOTE — HISTORY OF PRESENT ILLNESS
[de-identified] :   Patient is here for a cortisone injection of the right knee, patient had a cortisone injection about 4 months ago she has been doing well with this injection.  \par Patient states that he has still pain, but at least he is able to keep up with his wife when they go to the mall.\par

## 2022-10-11 ENCOUNTER — APPOINTMENT (OUTPATIENT)
Dept: UROLOGY | Facility: CLINIC | Age: 82
End: 2022-10-11

## 2022-10-11 VITALS
SYSTOLIC BLOOD PRESSURE: 124 MMHG | HEART RATE: 59 BPM | WEIGHT: 235 LBS | TEMPERATURE: 97.1 F | BODY MASS INDEX: 34.8 KG/M2 | HEIGHT: 69 IN | DIASTOLIC BLOOD PRESSURE: 72 MMHG

## 2022-10-11 PROCEDURE — 99214 OFFICE O/P EST MOD 30 MIN: CPT

## 2022-10-11 PROCEDURE — 81003 URINALYSIS AUTO W/O SCOPE: CPT | Mod: QW

## 2022-11-04 ENCOUNTER — APPOINTMENT (OUTPATIENT)
Dept: ORTHOPEDIC SURGERY | Facility: CLINIC | Age: 82
End: 2022-11-04

## 2022-11-04 PROCEDURE — 99213 OFFICE O/P EST LOW 20 MIN: CPT | Mod: 25

## 2022-11-04 PROCEDURE — 20610 DRAIN/INJ JOINT/BURSA W/O US: CPT | Mod: RT

## 2022-11-04 NOTE — HISTORY OF PRESENT ILLNESS
[de-identified] :  82-year-old gentleman with known history of right knee arthritis.  Seen by my PA in August and provided a 2nd cortisone injection which she did not find particularly helpful.  His pain is mostly laterally and anteriorly.  His pain is worse with stairs.  He walks without assistive devices.  He is limited to about 2 blocks of walking before he needs a break.  Patient uses Tylenol daily and topical Biofreeze.  Does not walk with a cane.  Returns accompanied by his wife here for 1st of 3 hyaluronic acid injections.

## 2022-11-04 NOTE — ASSESSMENT
[FreeTextEntry1] : 82-year-old gentleman with moderate to significant right knee arthritis.  He has a cortisone injections which have not helped him after the 2nd injection.  He is unable to take nonsteroidal anti-inflammatory medications due to history of hypertension) well controlled) and coronary artery disease for (he has had stents x2).  He also has non-insulin-dependent diabetes;patient does not know his hemoglobin A1c.  We talked about injection therapy.  We are going to try hyaluronic acid injections today.  He will get his 1st of 3 injections at his request.  In addition to the injection therapy were also going to enrolled in a physical therapy program.  Physical therapy will focus on terminal extension quadriceps and VMO strengthening gait training balance and generalized condition with modalities utilized adjunct therapy focus of physical therapy will be on teaching him a self-directed exercise program strengthening and stretching.  Therapy should avoid deep flexion exercises such as squatting lunges.\par \par Procedure:  With the patient's consent and at her request utilizing standard sterile technique he was provided a hyaluronic acid injection in to right knee through a superior lateral portal.  Patient tolerated the injection without issue.  Post-injection precautions were discussed.

## 2022-11-04 NOTE — IMAGING
[de-identified] :  pleasant older gentleman sits reasonably comfortably my office.  He was able get up on the exam table without assistance.  He is accompanied by his wife in the exam room.  \par \par Physical examination: \par Right knee:  Abnormal patellofemoral grind test.  Moderate synovial thickening.  Joint line tenderness medial and lateral compartments.  No effusion.  Neutral alignment.  No varus valgus instability.  Negative Lachman test.  No geniculate lymph nodes or masses.  Knee motion 0-90 degrees.\par \par Radiographs reviewed from February 15, 2022 demonstrate 3 compartment disease moderate to significant in nature.  Advanced arthritis is mostly noted about the patellofemoral joint.  There are osteophytes along the anterior posterior aspect of the femur as well as the lateral aspects and medial aspect of the femur.  Medial joint space is the same the lateral joint space at a roughly 3.9 mm.

## 2022-11-10 ENCOUNTER — OUTPATIENT (OUTPATIENT)
Dept: OUTPATIENT SERVICES | Facility: HOSPITAL | Age: 82
LOS: 1 days | Discharge: HOME | End: 2022-11-10

## 2022-11-10 ENCOUNTER — APPOINTMENT (OUTPATIENT)
Dept: ORTHOPEDIC SURGERY | Facility: CLINIC | Age: 82
End: 2022-11-10

## 2022-11-10 DIAGNOSIS — Z90.89 ACQUIRED ABSENCE OF OTHER ORGANS: Chronic | ICD-10-CM

## 2022-11-10 DIAGNOSIS — Z95.5 PRESENCE OF CORONARY ANGIOPLASTY IMPLANT AND GRAFT: Chronic | ICD-10-CM

## 2022-11-10 DIAGNOSIS — N28.1 CYST OF KIDNEY, ACQUIRED: ICD-10-CM

## 2022-11-10 DIAGNOSIS — Z98.890 OTHER SPECIFIED POSTPROCEDURAL STATES: Chronic | ICD-10-CM

## 2022-11-10 PROCEDURE — 74178 CT ABD&PLV WO CNTR FLWD CNTR: CPT | Mod: 26

## 2022-11-10 PROCEDURE — 20610 DRAIN/INJ JOINT/BURSA W/O US: CPT | Mod: RT

## 2022-11-10 NOTE — HISTORY OF PRESENT ILLNESS
[de-identified] :  Patient is an 82-year-old male accompanied by his wife who reports to the office for subsequent re-evaluation of his right knee pain/osteoarthritis.  He is here to receive his 2nd Orthovisc injection to the right knee.

## 2022-11-10 NOTE — PROCEDURE
[Large Joint Injection] : Large joint injection [Right] : of the right [Knee] : knee [Alcohol] : alcohol [Orthovisc] : Orthovisc [#2] : series #2 [] : Patient tolerated procedure well [Call if redness, pain or fever occur] : call if redness, pain or fever occur [Apply ice for 15min out of every hour for the next 12-24 hours as tolerated] : apply ice for 15 minutes out of every hour for the next 12-24 hours as tolerated

## 2022-11-10 NOTE — DISCUSSION/SUMMARY
[de-identified] :   With the patient's approval, the right knee 2nd Orthovisc injection was performed in the office today.  See the attached procedure note for further details.  Explained to the patient that the full effect of the medication may take up to 6 weeks for it to kick in.\par \par The patient was advised to rest/ice the area and can alternate with warm compresses.  Instructed not to do any strenuous activity that would further aggravate their symptoms.\par \par Patient will follow-up in 1 week for his 3rd and final injection.  All of the patient's questions/concerns were answered in detail.  \par \par The patient was seen under the supervision of Dr. Baum.\par \par

## 2022-11-17 ENCOUNTER — APPOINTMENT (OUTPATIENT)
Dept: ORTHOPEDIC SURGERY | Facility: CLINIC | Age: 82
End: 2022-11-17
Payer: MEDICARE

## 2022-11-17 DIAGNOSIS — M17.11 UNILATERAL PRIMARY OSTEOARTHRITIS, RIGHT KNEE: ICD-10-CM

## 2022-11-17 PROCEDURE — 20610 DRAIN/INJ JOINT/BURSA W/O US: CPT | Mod: RT

## 2022-11-17 PROCEDURE — 99212 OFFICE O/P EST SF 10 MIN: CPT | Mod: 25

## 2022-11-17 NOTE — DISCUSSION/SUMMARY
[de-identified] :   With the patient's approval, the right knee 3er Orthovisc injection was performed in the office today.  See the attached procedure note for further details.  Explained to the patient that the full effect of the medication may take up to 6 weeks for it to kick in.\par \par The patient was advised to rest/ice the area and can alternate with warm compresses.  Instructed not to do any strenuous activity that would further aggravate their symptoms.\par \par \par \par The patient was seen under the supervision of Dr. Herring\par \par

## 2022-11-17 NOTE — HISTORY OF PRESENT ILLNESS
[de-identified] :  Patient is an 82-year-old male accompanied by his wife who reports to the office for subsequent re-evaluation of his right knee pain/osteoarthritis.  He is here to receive his 3rd Orthovisc injection to the right knee.

## 2022-11-17 NOTE — PROCEDURE
[Large Joint Injection] : Large joint injection [Right] : of the right [Knee] : knee [Alcohol] : alcohol [Orthovisc] : Orthovisc [#3] : series #3 [] : Patient tolerated procedure well [Call if redness, pain or fever occur] : call if redness, pain or fever occur [Apply ice for 15min out of every hour for the next 12-24 hours as tolerated] : apply ice for 15 minutes out of every hour for the next 12-24 hours as tolerated

## 2022-12-01 ENCOUNTER — APPOINTMENT (OUTPATIENT)
Dept: UROLOGY | Facility: CLINIC | Age: 82
End: 2022-12-01

## 2022-12-01 VITALS
HEIGHT: 69 IN | HEART RATE: 62 BPM | WEIGHT: 235 LBS | SYSTOLIC BLOOD PRESSURE: 116 MMHG | DIASTOLIC BLOOD PRESSURE: 62 MMHG | BODY MASS INDEX: 34.8 KG/M2

## 2022-12-01 PROCEDURE — 81003 URINALYSIS AUTO W/O SCOPE: CPT | Mod: QW

## 2022-12-01 PROCEDURE — 99214 OFFICE O/P EST MOD 30 MIN: CPT

## 2022-12-09 ENCOUNTER — APPOINTMENT (OUTPATIENT)
Dept: ORTHOPEDIC SURGERY | Facility: CLINIC | Age: 82
End: 2022-12-09

## 2023-05-31 ENCOUNTER — APPOINTMENT (OUTPATIENT)
Dept: UROLOGY | Facility: CLINIC | Age: 83
End: 2023-05-31
Payer: MEDICARE

## 2023-05-31 VITALS
DIASTOLIC BLOOD PRESSURE: 70 MMHG | HEIGHT: 69 IN | SYSTOLIC BLOOD PRESSURE: 123 MMHG | HEART RATE: 63 BPM | WEIGHT: 233.1 LBS | BODY MASS INDEX: 34.52 KG/M2

## 2023-05-31 DIAGNOSIS — N13.8 BENIGN PROSTATIC HYPERPLASIA WITH LOWER URINARY TRACT SYMPMS: ICD-10-CM

## 2023-05-31 DIAGNOSIS — N40.1 BENIGN PROSTATIC HYPERPLASIA WITH LOWER URINARY TRACT SYMPMS: ICD-10-CM

## 2023-05-31 PROCEDURE — 99214 OFFICE O/P EST MOD 30 MIN: CPT

## 2023-05-31 PROCEDURE — 81003 URINALYSIS AUTO W/O SCOPE: CPT | Mod: QW

## 2023-06-29 ENCOUNTER — RESULT REVIEW (OUTPATIENT)
Age: 83
End: 2023-06-29

## 2023-06-29 ENCOUNTER — OUTPATIENT (OUTPATIENT)
Dept: OUTPATIENT SERVICES | Facility: HOSPITAL | Age: 83
LOS: 1 days | End: 2023-06-29
Payer: MEDICARE

## 2023-06-29 DIAGNOSIS — Z90.89 ACQUIRED ABSENCE OF OTHER ORGANS: Chronic | ICD-10-CM

## 2023-06-29 DIAGNOSIS — Z95.5 PRESENCE OF CORONARY ANGIOPLASTY IMPLANT AND GRAFT: Chronic | ICD-10-CM

## 2023-06-29 DIAGNOSIS — Z98.890 OTHER SPECIFIED POSTPROCEDURAL STATES: Chronic | ICD-10-CM

## 2023-06-29 DIAGNOSIS — R97.20 ELEVATED PROSTATE SPECIFIC ANTIGEN [PSA]: ICD-10-CM

## 2023-06-29 PROCEDURE — A9579: CPT

## 2023-06-29 PROCEDURE — 72197 MRI PELVIS W/O & W/DYE: CPT

## 2023-06-29 PROCEDURE — 72197 MRI PELVIS W/O & W/DYE: CPT | Mod: 26

## 2023-06-30 DIAGNOSIS — R97.20 ELEVATED PROSTATE SPECIFIC ANTIGEN [PSA]: ICD-10-CM

## 2023-08-11 ENCOUNTER — OUTPATIENT (OUTPATIENT)
Dept: OUTPATIENT SERVICES | Facility: HOSPITAL | Age: 83
LOS: 1 days | End: 2023-08-11
Payer: MEDICARE

## 2023-08-11 DIAGNOSIS — Z98.890 OTHER SPECIFIED POSTPROCEDURAL STATES: Chronic | ICD-10-CM

## 2023-08-11 DIAGNOSIS — Z00.8 ENCOUNTER FOR OTHER GENERAL EXAMINATION: ICD-10-CM

## 2023-08-11 DIAGNOSIS — M54.9 DORSALGIA, UNSPECIFIED: ICD-10-CM

## 2023-08-11 DIAGNOSIS — Z95.5 PRESENCE OF CORONARY ANGIOPLASTY IMPLANT AND GRAFT: Chronic | ICD-10-CM

## 2023-08-11 DIAGNOSIS — Z90.89 ACQUIRED ABSENCE OF OTHER ORGANS: Chronic | ICD-10-CM

## 2023-08-11 PROCEDURE — 72148 MRI LUMBAR SPINE W/O DYE: CPT

## 2023-08-11 PROCEDURE — 72148 MRI LUMBAR SPINE W/O DYE: CPT | Mod: 26

## 2023-08-12 DIAGNOSIS — M54.9 DORSALGIA, UNSPECIFIED: ICD-10-CM

## 2023-12-05 ENCOUNTER — APPOINTMENT (OUTPATIENT)
Dept: UROLOGY | Facility: CLINIC | Age: 83
End: 2023-12-05
Payer: MEDICARE

## 2023-12-05 PROCEDURE — 99213 OFFICE O/P EST LOW 20 MIN: CPT

## 2024-05-14 ENCOUNTER — RESULT CHARGE (OUTPATIENT)
Age: 84
End: 2024-05-14

## 2024-05-14 ENCOUNTER — APPOINTMENT (OUTPATIENT)
Dept: UROLOGY | Facility: CLINIC | Age: 84
End: 2024-05-14
Payer: MEDICARE

## 2024-05-14 VITALS
SYSTOLIC BLOOD PRESSURE: 134 MMHG | DIASTOLIC BLOOD PRESSURE: 72 MMHG | HEIGHT: 69 IN | HEART RATE: 65 BPM | RESPIRATION RATE: 18 BRPM | OXYGEN SATURATION: 95 % | WEIGHT: 233 LBS | TEMPERATURE: 98.1 F | BODY MASS INDEX: 34.51 KG/M2

## 2024-05-14 DIAGNOSIS — R97.20 ELEVATED PROSTATE, SPECIFIC ANTIGEN [PSA]: ICD-10-CM

## 2024-05-14 DIAGNOSIS — N28.1 CYST OF KIDNEY, ACQUIRED: ICD-10-CM

## 2024-05-14 DIAGNOSIS — R39.9 UNSPECIFIED SYMPTOMS AND SIGNS INVOLVING THE GENITOURINARY SYSTEM: ICD-10-CM

## 2024-05-14 DIAGNOSIS — N20.0 CALCULUS OF KIDNEY: ICD-10-CM

## 2024-05-14 LAB
BILIRUB UR QL STRIP: NORMAL
COLLECTION METHOD: NORMAL
GLUCOSE UR-MCNC: NORMAL
HCG UR QL: 1 EU/DL
HGB UR QL STRIP.AUTO: NORMAL
KETONES UR-MCNC: NORMAL
LEUKOCYTE ESTERASE UR QL STRIP: NORMAL
NITRITE UR QL STRIP: NORMAL
PH UR STRIP: 6
PROT UR STRIP-MCNC: NORMAL
SP GR UR STRIP: 1.02

## 2024-05-14 PROCEDURE — 99214 OFFICE O/P EST MOD 30 MIN: CPT

## 2024-05-14 PROCEDURE — G2211 COMPLEX E/M VISIT ADD ON: CPT

## 2024-05-14 RX ORDER — ATORVASTATIN CALCIUM 40 MG/1
40 TABLET, FILM COATED ORAL
Qty: 90 | Refills: 0 | Status: DISCONTINUED | COMMUNITY
Start: 2020-07-15 | End: 2024-05-14

## 2024-05-14 RX ORDER — OXCARBAZEPINE 150 MG/1
TABLET, FILM COATED ORAL
Refills: 0 | Status: DISCONTINUED | COMMUNITY
End: 2024-05-14

## 2024-05-14 RX ORDER — GLIMEPIRIDE 1 MG/1
1 TABLET ORAL
Refills: 0 | Status: DISCONTINUED | COMMUNITY
End: 2024-05-14

## 2024-05-14 RX ORDER — ASPIRIN 81 MG
81 TABLET, DELAYED RELEASE (ENTERIC COATED) ORAL
Refills: 0 | Status: ACTIVE | COMMUNITY

## 2024-05-14 RX ORDER — VIT C/E/ZN/COPPR/LUTEIN/ZEAXAN 250MG-90MG
CAPSULE ORAL
Refills: 0 | Status: ACTIVE | COMMUNITY

## 2024-05-14 RX ORDER — METOPROLOL TARTRATE 25 MG/1
25 TABLET, FILM COATED ORAL
Refills: 0 | Status: ACTIVE | COMMUNITY

## 2024-05-14 RX ORDER — OXCARBAZEPINE 60 MG/ML
300 SUSPENSION ORAL
Refills: 0 | Status: ACTIVE | COMMUNITY

## 2024-05-14 RX ORDER — ATORVASTATIN CALCIUM 20 MG/1
20 TABLET, FILM COATED ORAL
Refills: 0 | Status: ACTIVE | COMMUNITY

## 2024-05-14 RX ORDER — GLIMEPIRIDE 1 MG/1
1 TABLET ORAL
Refills: 0 | Status: ACTIVE | COMMUNITY

## 2024-05-14 RX ORDER — SIMVASTATIN 20 MG/1
20 TABLET, FILM COATED ORAL
Refills: 0 | Status: DISCONTINUED | COMMUNITY
End: 2024-05-14

## 2024-05-14 RX ORDER — HYLAN G-F 20 16MG/2ML
16 SYRINGE (ML) INTRAARTICULAR WEEKLY
Qty: 1 | Refills: 0 | Status: DISCONTINUED | COMMUNITY
Start: 2022-10-12 | End: 2024-05-14

## 2024-05-14 RX ORDER — METOPROLOL TARTRATE 50 MG/1
50 TABLET, FILM COATED ORAL
Refills: 0 | Status: DISCONTINUED | COMMUNITY
End: 2024-05-14

## 2024-05-14 RX ORDER — TICAGRELOR 90 MG/1
90 TABLET ORAL
Qty: 180 | Refills: 0 | Status: DISCONTINUED | COMMUNITY
Start: 2020-08-13 | End: 2024-05-14

## 2024-05-14 RX ORDER — ALLOPURINOL 100 MG/1
100 TABLET ORAL
Refills: 0 | Status: DISCONTINUED | COMMUNITY
End: 2024-05-14

## 2024-05-14 RX ORDER — CYCLOSPORINE 0.5 MG/ML
0.05 EMULSION OPHTHALMIC
Refills: 0 | Status: ACTIVE | COMMUNITY

## 2024-11-20 ENCOUNTER — APPOINTMENT (OUTPATIENT)
Dept: UROLOGY | Facility: CLINIC | Age: 84
End: 2024-11-20
Payer: MEDICARE

## 2024-11-20 ENCOUNTER — NON-APPOINTMENT (OUTPATIENT)
Age: 84
End: 2024-11-20

## 2024-11-20 DIAGNOSIS — R39.9 UNSPECIFIED SYMPTOMS AND SIGNS INVOLVING THE GENITOURINARY SYSTEM: ICD-10-CM

## 2024-11-20 DIAGNOSIS — N20.0 CALCULUS OF KIDNEY: ICD-10-CM

## 2024-11-20 DIAGNOSIS — R97.20 ELEVATED PROSTATE, SPECIFIC ANTIGEN [PSA]: ICD-10-CM

## 2024-11-20 DIAGNOSIS — N13.8 BENIGN PROSTATIC HYPERPLASIA WITH LOWER URINARY TRACT SYMPMS: ICD-10-CM

## 2024-11-20 DIAGNOSIS — N40.1 BENIGN PROSTATIC HYPERPLASIA WITH LOWER URINARY TRACT SYMPMS: ICD-10-CM

## 2024-11-20 DIAGNOSIS — N28.1 CYST OF KIDNEY, ACQUIRED: ICD-10-CM

## 2024-11-20 LAB
BILIRUB UR QL STRIP: NORMAL
COLLECTION METHOD: NORMAL
GLUCOSE UR-MCNC: NORMAL
HCG UR QL: 0.2 EU/DL
HGB UR QL STRIP.AUTO: NORMAL
KETONES UR-MCNC: NORMAL
LEUKOCYTE ESTERASE UR QL STRIP: NORMAL
NITRITE UR QL STRIP: NORMAL
PH UR STRIP: 6.5
PROT UR STRIP-MCNC: NORMAL
SP GR UR STRIP: 1.01

## 2024-11-20 PROCEDURE — 99214 OFFICE O/P EST MOD 30 MIN: CPT

## 2024-11-20 PROCEDURE — 81003 URINALYSIS AUTO W/O SCOPE: CPT | Mod: QW

## 2024-11-20 PROCEDURE — G2211 COMPLEX E/M VISIT ADD ON: CPT

## 2025-02-23 ENCOUNTER — INPATIENT (INPATIENT)
Facility: HOSPITAL | Age: 85
LOS: 3 days | Discharge: HOME | DRG: 280 | End: 2025-02-27
Attending: INTERNAL MEDICINE | Admitting: INTERNAL MEDICINE
Payer: MEDICARE

## 2025-02-23 VITALS
WEIGHT: 229.94 LBS | RESPIRATION RATE: 20 BRPM | HEIGHT: 68 IN | HEART RATE: 76 BPM | SYSTOLIC BLOOD PRESSURE: 174 MMHG | TEMPERATURE: 98 F | OXYGEN SATURATION: 98 % | DIASTOLIC BLOOD PRESSURE: 89 MMHG

## 2025-02-23 DIAGNOSIS — Z98.890 OTHER SPECIFIED POSTPROCEDURAL STATES: Chronic | ICD-10-CM

## 2025-02-23 DIAGNOSIS — Z90.89 ACQUIRED ABSENCE OF OTHER ORGANS: Chronic | ICD-10-CM

## 2025-02-23 DIAGNOSIS — Z95.5 PRESENCE OF CORONARY ANGIOPLASTY IMPLANT AND GRAFT: Chronic | ICD-10-CM

## 2025-02-23 DIAGNOSIS — I26.99 OTHER PULMONARY EMBOLISM WITHOUT ACUTE COR PULMONALE: ICD-10-CM

## 2025-02-23 LAB
ALBUMIN SERPL ELPH-MCNC: 4.7 G/DL — SIGNIFICANT CHANGE UP (ref 3.5–5.2)
ALP SERPL-CCNC: 100 U/L — SIGNIFICANT CHANGE UP (ref 30–115)
ALT FLD-CCNC: 32 U/L — SIGNIFICANT CHANGE UP (ref 0–41)
ANION GAP SERPL CALC-SCNC: 12 MMOL/L — SIGNIFICANT CHANGE UP (ref 7–14)
ANISOCYTOSIS BLD QL: SLIGHT — SIGNIFICANT CHANGE UP
APTT BLD: 59.5 SEC — HIGH (ref 27–39.2)
APTT BLD: >200 SEC — CRITICAL HIGH (ref 27–39.2)
AST SERPL-CCNC: 38 U/L — SIGNIFICANT CHANGE UP (ref 0–41)
BASOPHILS # BLD AUTO: 0 K/UL — SIGNIFICANT CHANGE UP (ref 0–0.2)
BASOPHILS NFR BLD AUTO: 0 % — SIGNIFICANT CHANGE UP (ref 0–1)
BILIRUB SERPL-MCNC: 0.5 MG/DL — SIGNIFICANT CHANGE UP (ref 0.2–1.2)
BUN SERPL-MCNC: 29 MG/DL — HIGH (ref 10–20)
CALCIUM SERPL-MCNC: 9 MG/DL — SIGNIFICANT CHANGE UP (ref 8.4–10.5)
CHLORIDE SERPL-SCNC: 99 MMOL/L — SIGNIFICANT CHANGE UP (ref 98–110)
CO2 SERPL-SCNC: 28 MMOL/L — SIGNIFICANT CHANGE UP (ref 17–32)
CREAT SERPL-MCNC: 1.6 MG/DL — HIGH (ref 0.7–1.5)
D DIMER BLD IA.RAPID-MCNC: 6978 NG/ML DDU — HIGH
EGFR: 43 ML/MIN/1.73M2 — LOW
EOSINOPHIL # BLD AUTO: 0.17 K/UL — SIGNIFICANT CHANGE UP (ref 0–0.7)
EOSINOPHIL NFR BLD AUTO: 2.6 % — SIGNIFICANT CHANGE UP (ref 0–8)
GLUCOSE BLDC GLUCOMTR-MCNC: 200 MG/DL — HIGH (ref 70–99)
GLUCOSE BLDC GLUCOMTR-MCNC: 76 MG/DL — SIGNIFICANT CHANGE UP (ref 70–99)
GLUCOSE SERPL-MCNC: 201 MG/DL — HIGH (ref 70–99)
HCT VFR BLD CALC: 44.6 % — SIGNIFICANT CHANGE UP (ref 42–52)
HCT VFR BLD CALC: 48 % — SIGNIFICANT CHANGE UP (ref 42–52)
HGB BLD-MCNC: 15 G/DL — SIGNIFICANT CHANGE UP (ref 14–18)
HGB BLD-MCNC: 15.8 G/DL — SIGNIFICANT CHANGE UP (ref 14–18)
INR BLD: 0.93 RATIO — SIGNIFICANT CHANGE UP (ref 0.65–1.3)
LYMPHOCYTES # BLD AUTO: 1.68 K/UL — SIGNIFICANT CHANGE UP (ref 1.2–3.4)
LYMPHOCYTES # BLD AUTO: 25.2 % — SIGNIFICANT CHANGE UP (ref 20.5–51.1)
MANUAL SMEAR VERIFICATION: SIGNIFICANT CHANGE UP
MCHC RBC-ENTMCNC: 31.4 PG — HIGH (ref 27–31)
MCHC RBC-ENTMCNC: 31.4 PG — HIGH (ref 27–31)
MCHC RBC-ENTMCNC: 32.9 G/DL — SIGNIFICANT CHANGE UP (ref 32–37)
MCHC RBC-ENTMCNC: 33.6 G/DL — SIGNIFICANT CHANGE UP (ref 32–37)
MCV RBC AUTO: 93.3 FL — SIGNIFICANT CHANGE UP (ref 80–94)
MCV RBC AUTO: 95.4 FL — HIGH (ref 80–94)
MICROCYTES BLD QL: SLIGHT — SIGNIFICANT CHANGE UP
MONOCYTES # BLD AUTO: 0.41 K/UL — SIGNIFICANT CHANGE UP (ref 0.1–0.6)
MONOCYTES NFR BLD AUTO: 6.1 % — SIGNIFICANT CHANGE UP (ref 1.7–9.3)
NEUTROPHILS # BLD AUTO: 4.17 K/UL — SIGNIFICANT CHANGE UP (ref 1.4–6.5)
NEUTROPHILS NFR BLD AUTO: 62.6 % — SIGNIFICANT CHANGE UP (ref 42.2–75.2)
NRBC BLD AUTO-RTO: 0 /100 WBCS — SIGNIFICANT CHANGE UP (ref 0–0)
NT-PROBNP SERPL-SCNC: 163 PG/ML — SIGNIFICANT CHANGE UP (ref 0–300)
PLAT MORPH BLD: NORMAL — SIGNIFICANT CHANGE UP
PLATELET # BLD AUTO: 90 K/UL — LOW (ref 130–400)
PLATELET # BLD AUTO: 90 K/UL — LOW (ref 130–400)
PMV BLD: 12.2 FL — HIGH (ref 7.4–10.4)
PMV BLD: 12.6 FL — HIGH (ref 7.4–10.4)
POLYCHROMASIA BLD QL SMEAR: SLIGHT — SIGNIFICANT CHANGE UP
POTASSIUM SERPL-MCNC: 4.9 MMOL/L — SIGNIFICANT CHANGE UP (ref 3.5–5)
POTASSIUM SERPL-SCNC: 4.9 MMOL/L — SIGNIFICANT CHANGE UP (ref 3.5–5)
PROT SERPL-MCNC: 6.9 G/DL — SIGNIFICANT CHANGE UP (ref 6–8)
PROTHROM AB SERPL-ACNC: 11 SEC — SIGNIFICANT CHANGE UP (ref 9.95–12.87)
RBC # BLD: 4.78 M/UL — SIGNIFICANT CHANGE UP (ref 4.7–6.1)
RBC # BLD: 5.03 M/UL — SIGNIFICANT CHANGE UP (ref 4.7–6.1)
RBC # FLD: 13.2 % — SIGNIFICANT CHANGE UP (ref 11.5–14.5)
RBC # FLD: 13.2 % — SIGNIFICANT CHANGE UP (ref 11.5–14.5)
RBC BLD AUTO: ABNORMAL
SODIUM SERPL-SCNC: 139 MMOL/L — SIGNIFICANT CHANGE UP (ref 135–146)
TROPONIN T, HIGH SENSITIVITY RESULT: 39 NG/L — HIGH (ref 6–21)
TROPONIN T, HIGH SENSITIVITY RESULT: 48 NG/L — HIGH (ref 6–21)
TROPONIN T, HIGH SENSITIVITY RESULT: 86 NG/L — CRITICAL HIGH (ref 6–21)
TROPONIN T, HIGH SENSITIVITY RESULT: 99 NG/L — CRITICAL HIGH (ref 6–21)
TROPONIN T, HIGH SENSITIVITY RESULT: 99 NG/L — CRITICAL HIGH (ref 6–21)
VARIANT LYMPHS # BLD: 3.5 % — SIGNIFICANT CHANGE UP (ref 0–5)
VARIANT LYMPHS NFR BLD MANUAL: 3.5 % — SIGNIFICANT CHANGE UP (ref 0–5)
WBC # BLD: 6.66 K/UL — SIGNIFICANT CHANGE UP (ref 4.8–10.8)
WBC # BLD: 6.85 K/UL — SIGNIFICANT CHANGE UP (ref 4.8–10.8)
WBC # FLD AUTO: 6.66 K/UL — SIGNIFICANT CHANGE UP (ref 4.8–10.8)
WBC # FLD AUTO: 6.85 K/UL — SIGNIFICANT CHANGE UP (ref 4.8–10.8)

## 2025-02-23 PROCEDURE — C1725: CPT

## 2025-02-23 PROCEDURE — 83735 ASSAY OF MAGNESIUM: CPT

## 2025-02-23 PROCEDURE — 93306 TTE W/DOPPLER COMPLETE: CPT

## 2025-02-23 PROCEDURE — 93458 L HRT ARTERY/VENTRICLE ANGIO: CPT | Mod: 59

## 2025-02-23 PROCEDURE — C1769: CPT

## 2025-02-23 PROCEDURE — 84443 ASSAY THYROID STIM HORMONE: CPT

## 2025-02-23 PROCEDURE — 71046 X-RAY EXAM CHEST 2 VIEWS: CPT | Mod: 26

## 2025-02-23 PROCEDURE — 97162 PT EVAL MOD COMPLEX 30 MIN: CPT | Mod: GP

## 2025-02-23 PROCEDURE — C9600: CPT | Mod: LD

## 2025-02-23 PROCEDURE — C1874: CPT

## 2025-02-23 PROCEDURE — 93010 ELECTROCARDIOGRAM REPORT: CPT | Mod: 76

## 2025-02-23 PROCEDURE — 85027 COMPLETE CBC AUTOMATED: CPT

## 2025-02-23 PROCEDURE — 97116 GAIT TRAINING THERAPY: CPT | Mod: GP

## 2025-02-23 PROCEDURE — 85730 THROMBOPLASTIN TIME PARTIAL: CPT

## 2025-02-23 PROCEDURE — 82962 GLUCOSE BLOOD TEST: CPT

## 2025-02-23 PROCEDURE — C1894: CPT

## 2025-02-23 PROCEDURE — 99285 EMERGENCY DEPT VISIT HI MDM: CPT

## 2025-02-23 PROCEDURE — 93970 EXTREMITY STUDY: CPT

## 2025-02-23 PROCEDURE — 36415 COLL VENOUS BLD VENIPUNCTURE: CPT

## 2025-02-23 PROCEDURE — 92978 ENDOLUMINL IVUS OCT C 1ST: CPT | Mod: LD

## 2025-02-23 PROCEDURE — 93005 ELECTROCARDIOGRAM TRACING: CPT

## 2025-02-23 PROCEDURE — 99222 1ST HOSP IP/OBS MODERATE 55: CPT

## 2025-02-23 PROCEDURE — 83036 HEMOGLOBIN GLYCOSYLATED A1C: CPT

## 2025-02-23 PROCEDURE — C1753: CPT

## 2025-02-23 PROCEDURE — 80048 BASIC METABOLIC PNL TOTAL CA: CPT

## 2025-02-23 PROCEDURE — 80061 LIPID PANEL: CPT

## 2025-02-23 PROCEDURE — C1760: CPT

## 2025-02-23 PROCEDURE — 84484 ASSAY OF TROPONIN QUANT: CPT

## 2025-02-23 PROCEDURE — C1887: CPT

## 2025-02-23 PROCEDURE — 71275 CT ANGIOGRAPHY CHEST: CPT | Mod: 26

## 2025-02-23 RX ORDER — DEXTROSE 50 % IN WATER 50 %
12.5 SYRINGE (ML) INTRAVENOUS ONCE
Refills: 0 | Status: DISCONTINUED | OUTPATIENT
Start: 2025-02-23 | End: 2025-02-27

## 2025-02-23 RX ORDER — OXCARBAZEPINE 150 MG/1
300 TABLET, FILM COATED ORAL
Refills: 0 | Status: DISCONTINUED | OUTPATIENT
Start: 2025-02-23 | End: 2025-02-27

## 2025-02-23 RX ORDER — GLIMEPIRIDE 4 MG/1
1 TABLET ORAL
Refills: 0 | DISCHARGE

## 2025-02-23 RX ORDER — DEXTROSE 50 % IN WATER 50 %
15 SYRINGE (ML) INTRAVENOUS ONCE
Refills: 0 | Status: DISCONTINUED | OUTPATIENT
Start: 2025-02-23 | End: 2025-02-27

## 2025-02-23 RX ORDER — SODIUM CHLORIDE 9 G/1000ML
1000 INJECTION, SOLUTION INTRAVENOUS
Refills: 0 | Status: DISCONTINUED | OUTPATIENT
Start: 2025-02-23 | End: 2025-02-27

## 2025-02-23 RX ORDER — DEXTROSE 50 % IN WATER 50 %
25 SYRINGE (ML) INTRAVENOUS ONCE
Refills: 0 | Status: DISCONTINUED | OUTPATIENT
Start: 2025-02-23 | End: 2025-02-27

## 2025-02-23 RX ORDER — ASPIRIN 325 MG
81 TABLET ORAL DAILY
Refills: 0 | Status: DISCONTINUED | OUTPATIENT
Start: 2025-02-23 | End: 2025-02-26

## 2025-02-23 RX ORDER — SENNA 187 MG
2 TABLET ORAL AT BEDTIME
Refills: 0 | Status: DISCONTINUED | OUTPATIENT
Start: 2025-02-23 | End: 2025-02-27

## 2025-02-23 RX ORDER — GLUCAGON 3 MG/1
1 POWDER NASAL ONCE
Refills: 0 | Status: DISCONTINUED | OUTPATIENT
Start: 2025-02-23 | End: 2025-02-27

## 2025-02-23 RX ORDER — HEPARIN SODIUM 1000 [USP'U]/ML
INJECTION INTRAVENOUS; SUBCUTANEOUS
Qty: 25000 | Refills: 0 | Status: DISCONTINUED | OUTPATIENT
Start: 2025-02-23 | End: 2025-02-25

## 2025-02-23 RX ORDER — CYCLOSPORINE OPHTHALMIC SOLUTION 1 MG/ML
1 SOLUTION/ DROPS OPHTHALMIC
Refills: 0 | DISCHARGE

## 2025-02-23 RX ORDER — ATORVASTATIN CALCIUM 80 MG/1
40 TABLET, FILM COATED ORAL AT BEDTIME
Refills: 0 | Status: DISCONTINUED | OUTPATIENT
Start: 2025-02-23 | End: 2025-02-26

## 2025-02-23 RX ORDER — OXCARBAZEPINE 150 MG/1
1 TABLET, FILM COATED ORAL
Refills: 0 | DISCHARGE

## 2025-02-23 RX ORDER — HEPARIN SODIUM 1000 [USP'U]/ML
8500 INJECTION INTRAVENOUS; SUBCUTANEOUS ONCE
Refills: 0 | Status: COMPLETED | OUTPATIENT
Start: 2025-02-23 | End: 2025-02-23

## 2025-02-23 RX ORDER — ASPIRIN 325 MG
325 TABLET ORAL ONCE
Refills: 0 | Status: COMPLETED | OUTPATIENT
Start: 2025-02-23 | End: 2025-02-23

## 2025-02-23 RX ORDER — INSULIN LISPRO 100 U/ML
INJECTION, SOLUTION INTRAVENOUS; SUBCUTANEOUS
Refills: 0 | Status: DISCONTINUED | OUTPATIENT
Start: 2025-02-23 | End: 2025-02-27

## 2025-02-23 RX ORDER — METOPROLOL SUCCINATE 50 MG/1
25 TABLET, EXTENDED RELEASE ORAL DAILY
Refills: 0 | Status: DISCONTINUED | OUTPATIENT
Start: 2025-02-23 | End: 2025-02-27

## 2025-02-23 RX ORDER — AMLODIPINE BESYLATE 10 MG/1
2.5 TABLET ORAL DAILY
Refills: 0 | Status: DISCONTINUED | OUTPATIENT
Start: 2025-02-23 | End: 2025-02-27

## 2025-02-23 RX ORDER — INSULIN LISPRO 100 U/ML
2 INJECTION, SOLUTION INTRAVENOUS; SUBCUTANEOUS
Refills: 0 | Status: DISCONTINUED | OUTPATIENT
Start: 2025-02-23 | End: 2025-02-27

## 2025-02-23 RX ORDER — INSULIN GLARGINE-YFGN 100 [IU]/ML
8 INJECTION, SOLUTION SUBCUTANEOUS AT BEDTIME
Refills: 0 | Status: DISCONTINUED | OUTPATIENT
Start: 2025-02-23 | End: 2025-02-27

## 2025-02-23 RX ORDER — SODIUM CHLORIDE 9 G/1000ML
1000 INJECTION, SOLUTION INTRAVENOUS
Refills: 0 | Status: DISCONTINUED | OUTPATIENT
Start: 2025-02-23 | End: 2025-02-23

## 2025-02-23 RX ORDER — FUROSEMIDE 10 MG/ML
20 INJECTION INTRAMUSCULAR; INTRAVENOUS DAILY
Refills: 0 | Status: DISCONTINUED | OUTPATIENT
Start: 2025-02-23 | End: 2025-02-27

## 2025-02-23 RX ADMIN — Medication 325 MILLIGRAM(S): at 04:01

## 2025-02-23 RX ADMIN — OXCARBAZEPINE 300 MILLIGRAM(S): 150 TABLET, FILM COATED ORAL at 18:05

## 2025-02-23 RX ADMIN — ATORVASTATIN CALCIUM 40 MILLIGRAM(S): 80 TABLET, FILM COATED ORAL at 22:34

## 2025-02-23 RX ADMIN — METOPROLOL SUCCINATE 25 MILLIGRAM(S): 50 TABLET, EXTENDED RELEASE ORAL at 18:06

## 2025-02-23 RX ADMIN — SODIUM CHLORIDE 100 MILLILITER(S): 9 INJECTION, SOLUTION INTRAVENOUS at 10:02

## 2025-02-23 RX ADMIN — Medication 300 MILLIGRAM(S): at 18:05

## 2025-02-23 RX ADMIN — HEPARIN SODIUM 1800 UNIT(S)/HR: 1000 INJECTION INTRAVENOUS; SUBCUTANEOUS at 08:21

## 2025-02-23 RX ADMIN — HEPARIN SODIUM 8500 UNIT(S): 1000 INJECTION INTRAVENOUS; SUBCUTANEOUS at 08:20

## 2025-02-23 RX ADMIN — Medication 81 MILLIGRAM(S): at 18:06

## 2025-02-23 RX ADMIN — AMLODIPINE BESYLATE 2.5 MILLIGRAM(S): 10 TABLET ORAL at 18:05

## 2025-02-23 RX ADMIN — HEPARIN SODIUM 0 UNIT(S)/HR: 1000 INJECTION INTRAVENOUS; SUBCUTANEOUS at 16:00

## 2025-02-23 RX ADMIN — HEPARIN SODIUM 1500 UNIT(S)/HR: 1000 INJECTION INTRAVENOUS; SUBCUTANEOUS at 16:56

## 2025-02-23 RX ADMIN — INSULIN LISPRO 2 UNIT(S): 100 INJECTION, SOLUTION INTRAVENOUS; SUBCUTANEOUS at 18:01

## 2025-02-23 RX ADMIN — FUROSEMIDE 20 MILLIGRAM(S): 10 INJECTION INTRAMUSCULAR; INTRAVENOUS at 18:06

## 2025-02-23 NOTE — ED PROVIDER NOTE - CLINICAL SUMMARY MEDICAL DECISION MAKING FREE TEXT BOX
history of diabetes hypertension hyperlipidemia presenting with chest pain.  Patient had a midsternal chest pain.  Initially Trope elevated and D-dimer significantly elevated.  Found to have a pulmonary embolus in the anterior segment of the right upper lobe on CTA.  There is no elevation of the BNP.  Troponin up trended.  Mild elevation of the creatinine to 1.6.  Patient was admitted to the telemetry medicine service for further management of new PE with Trope troponin elevation.  On EKG there were changes consistent with PE.  Monitor patient's chest x-ray there is no infiltrates or cardiomegaly

## 2025-02-23 NOTE — ED PROVIDER NOTE - ATTENDING APP SHARED VISIT CONTRIBUTION OF CARE
84-year-old male with history of diabetes hypertension hyperlipidemia presents with 1 day of chest pain, pleuritic, some shortness of breath.  Goes to his back.  On exam nontoxic, vital signs noted  Gen: Alert, NAD  Skin: Warm, dry, intact  Head: NCAT  ENT: Mucous membranes moist  Neck: Supple  CV: RRR, normal S1, S2, no m/r/g  Resp: Non labored respirations, Lungs CTA b/l, no wheezes, rales, rhonchi  Abdomen: Soft, nondistended, nontender  Extremities: Moving all extremities, no edema  Neuro: No focal neuro deficits  Psych: Cooperative  EKG normal sinus rhythm, normal axis, Q waves and T wave inversions inferiorly, no ST depression or elevation, normal intervals  Given pleuritic component D-dimer sent and positive, CT positive for PE.  Has creatinine 1.6 so we will start heparin and if second troponin with positive delta discussed with cardiology.  If negative delta will admit to telemetry

## 2025-02-23 NOTE — H&P ADULT - HISTORY OF PRESENT ILLNESS
84-year-old-year-old male history of diabetes, hypertension, hyperlipidemia presenting to ER for evaluation of 1 day of chest pain.  States since 11 PM last night has had midsternal chest pain described as a constant dull ache with associated shortness of breath.  Symptoms were worse with inspiration. Chest pain lasted around 45 mins and gradually subsided after arrival to ED. He has no associated fever, chills, cough, URI symptoms, leg pain or swelling, abdominal pain, nausea, vomiting or diarrhea. To note patient has sedentary lifestyle and limited mobility secondary to shortness of breath on exertion and peripheral neuropathy.    On presentation vitals:  · BP Systolic	 174 mm Hg  · BP Diastolic	89 mm Hg  · Heart Rate	76 /min  · Respiration Rate (breaths/min)	20 /min  · Temp (F)	97.6 Degrees F  · Temp (C)	36.4 Degrees C  · Temp site	oral  · SpO2 (%)	98 %  · O2 Delivery/Oxygen Delivery Method	room air    Labs are significant for Trop 39> 48, Cr 1.6.  EKG normal sinus rhythm, normal axis, Q waves and T wave inversions inferiorly, no ST depression or elevation.    CTA:  Pulmonary embolus present in anterior segment of the right upper lobe (series 4 image 87).  Right heart is relatively larger than the left. Given the small clot burden, it is doubtful that this reflects acute right heart strain and may reflect chronic changes. Echo for evaluation of right heart strain is suggested.    Patient admitted to medicine for PE and suspected NSTEMI.

## 2025-02-23 NOTE — H&P ADULT - ATTENDING COMMENTS
84-year-old-year-old male with a pmhx of DM, HTN, HLD, GAURAV, CAD s/p PCI of distal circumflex (2020), seizure (2017 once) presenting with midsternal chest pain associated with sob that history of diabetes, hypertension, hyperlipidemia presenting to ER for evaluation of 1 day of chest pain.  States since 11 PM last night has had midsternal chest pain described as a constant dull ache with associated shortness of breath.  Symptoms were worse with inspiration. Chest pain lasted around 45 mins and gradually subsided after arrival to ED.     I spoke to the cardiology fellow, Dr. Peter Diaz, after third troponin increased. Pt has no chest pain currently. Recommended to continue heparin drip and aspirin and obtain a TTE. Did not recommend a second antiplatelet and no intervention overnight for now. Consult placed for pt's private cardiologist, Dr. Mcmahon.     Vitals: Afebrile, HR 76 /89 RR 20 98% on RA  Labs are significant for Trop 39> 48>86, Cr 1.6.  EKG normal sinus rhythm, normal axis, Q waves and T wave inversions inferiorly, no ST depression or elevation.  CTA: Pulmonary embolus present in anterior segment of the right upper lobe (series 4 image 87).  Right heart is relatively larger than the left. Given the small clot burden, it is doubtful that this reflects acute right heart strain and may reflect chronic changes. Echo for evaluation of right heart strain is suggested.    #CAD s/p stent to Lt circumflex in 2020 now with chest pain concerning for NSTEMI  #ACUTE PE  - CTA CHEST: Pulmonary embolus present in anterior segment of the right upper lobe  - ECG with TWI inferior leads  - s/p ASA loading, continue on asa 81mg qd  - Trend Trop to peak  - c/w Heparin drip for now  - check TTE, lipid panel, a1c, TSH  - cardiology consult with Dr. Mcmahon  -  Statin  - hold off starting another AP pending cardiology eval (chest pain completely resolved)  - PT consult for evaluation of limited ambulation   - check duplex venous LL    #CKDIIIA  -Cr at baseline  -c/w Furosemide 20mg OD    #DM  -check a1c  -start insulin regimen and adjust based on FS      #DVT PPX: On heparin drip  #AAT: PT eval  Dispo: Tele  From Home with no needs    Pt seen and billed on 2/23

## 2025-02-23 NOTE — ED PROVIDER NOTE - PROGRESS NOTE DETAILS
AE: Increase in second troponin. Case discussed in detail with ICU fellow. Patient is not tachycardic, satting well, normotensive, and without an elevated BNP. Stable for med tele.

## 2025-02-23 NOTE — ED PROVIDER NOTE - OBJECTIVE STATEMENT
84-year-old-year-old male history of diabetes, hypertension, hyperlipidemia presenting to ER for evaluation of 1 day of chest pain.  States since 11 PM last night has had midsternal chest pain described as a constant dull ache with associated back pain.  Symptoms are worse with inspiration and is complaining of associated mild shortness of breath.  He has no associated fever, chills, cough, URI symptoms, leg pain or swelling, abdominal pain, nausea, vomiting or diarrhea.

## 2025-02-23 NOTE — H&P ADULT - ASSESSMENT
#chest pain   #ACUTE PE  #Suspected NSTEMI  - rapid resolution of pain is not suggestive of PE related pain  - CTA CHEST: Pulmonary embolus present in anterior segment of the right upper lobe  - ECG with TWI inferior leads  - s/p ASA loading  - Trend Trop to peak  - c/w Heparin drip as can address both etiologies for now  - check TTE, lipid panel, a1c, TSH  - cardiology consult   - c/w ASA, Statin  - hold off starting another AP pending cardiology eval (chest pain completely resolved)  - PT consult for evaluation of limited ambulation   - Needs age appropriate cancer screening as outpatient   - check duplex venous LL    #CKDIIIA  -Cr at baseline  -c/w Furosemide 20mg OD    #DM  -check a1c  -start insulin regimen and adjust based on FS      #DVT PPX: On heparin drip  #AAT: PT eval  Dispo: Tele  From Home with no needs

## 2025-02-23 NOTE — CHART NOTE - NSCHARTNOTEFT_GEN_A_CORE
Pt states he gave the admitting doctor the wrong cardiologist. Cardiologist name is dr. Crouch located on [address] [facility]. Please follow up as needed

## 2025-02-23 NOTE — ED ADULT NURSE REASSESSMENT NOTE - NS ED NURSE REASSESS COMMENT FT1
pt assessed A&Ox4, VSS, heparin drip and IVP given to pt as per MD Orders, pt verbalized understanding , pt remains on bed alarm , given urinal

## 2025-02-24 ENCOUNTER — RESULT REVIEW (OUTPATIENT)
Age: 85
End: 2025-02-24

## 2025-02-24 LAB
A1C WITH ESTIMATED AVERAGE GLUCOSE RESULT: 7.8 % — HIGH (ref 4–5.6)
ANION GAP SERPL CALC-SCNC: 12 MMOL/L — SIGNIFICANT CHANGE UP (ref 7–14)
APTT BLD: 56.3 SEC — HIGH (ref 27–39.2)
APTT BLD: 69.5 SEC — HIGH (ref 27–39.2)
APTT BLD: 78.3 SEC — CRITICAL HIGH (ref 27–39.2)
BUN SERPL-MCNC: 25 MG/DL — HIGH (ref 10–20)
CALCIUM SERPL-MCNC: 9.2 MG/DL — SIGNIFICANT CHANGE UP (ref 8.4–10.5)
CHLORIDE SERPL-SCNC: 102 MMOL/L — SIGNIFICANT CHANGE UP (ref 98–110)
CHOLEST SERPL-MCNC: 154 MG/DL — SIGNIFICANT CHANGE UP
CO2 SERPL-SCNC: 26 MMOL/L — SIGNIFICANT CHANGE UP (ref 17–32)
CREAT SERPL-MCNC: 1.5 MG/DL — SIGNIFICANT CHANGE UP (ref 0.7–1.5)
EGFR: 46 ML/MIN/1.73M2 — LOW
ESTIMATED AVERAGE GLUCOSE: 177 MG/DL — HIGH (ref 68–114)
GLUCOSE BLDC GLUCOMTR-MCNC: 142 MG/DL — HIGH (ref 70–99)
GLUCOSE BLDC GLUCOMTR-MCNC: 159 MG/DL — HIGH (ref 70–99)
GLUCOSE BLDC GLUCOMTR-MCNC: 175 MG/DL — HIGH (ref 70–99)
GLUCOSE BLDC GLUCOMTR-MCNC: 203 MG/DL — HIGH (ref 70–99)
GLUCOSE SERPL-MCNC: 150 MG/DL — HIGH (ref 70–99)
HCT VFR BLD CALC: 44.1 % — SIGNIFICANT CHANGE UP (ref 42–52)
HDLC SERPL-MCNC: 38 MG/DL — LOW
HGB BLD-MCNC: 14.6 G/DL — SIGNIFICANT CHANGE UP (ref 14–18)
LIPID PNL WITH DIRECT LDL SERPL: 89 MG/DL — SIGNIFICANT CHANGE UP
MAGNESIUM SERPL-MCNC: 2 MG/DL — SIGNIFICANT CHANGE UP (ref 1.8–2.4)
MCHC RBC-ENTMCNC: 31.7 PG — HIGH (ref 27–31)
MCHC RBC-ENTMCNC: 33.1 G/DL — SIGNIFICANT CHANGE UP (ref 32–37)
MCV RBC AUTO: 95.7 FL — HIGH (ref 80–94)
NON HDL CHOLESTEROL: 116 MG/DL — SIGNIFICANT CHANGE UP
NRBC BLD AUTO-RTO: 0 /100 WBCS — SIGNIFICANT CHANGE UP (ref 0–0)
PLATELET # BLD AUTO: 83 K/UL — LOW (ref 130–400)
PMV BLD: 12.5 FL — HIGH (ref 7.4–10.4)
POTASSIUM SERPL-MCNC: 4 MMOL/L — SIGNIFICANT CHANGE UP (ref 3.5–5)
POTASSIUM SERPL-SCNC: 4 MMOL/L — SIGNIFICANT CHANGE UP (ref 3.5–5)
RBC # BLD: 4.61 M/UL — LOW (ref 4.7–6.1)
RBC # FLD: 13.4 % — SIGNIFICANT CHANGE UP (ref 11.5–14.5)
SODIUM SERPL-SCNC: 140 MMOL/L — SIGNIFICANT CHANGE UP (ref 135–146)
TRIGL SERPL-MCNC: 157 MG/DL — HIGH
TROPONIN T, HIGH SENSITIVITY RESULT: 116 NG/L — CRITICAL HIGH (ref 6–21)
TROPONIN T, HIGH SENSITIVITY RESULT: 132 NG/L — CRITICAL HIGH (ref 6–21)
TROPONIN T, HIGH SENSITIVITY RESULT: 137 NG/L — CRITICAL HIGH (ref 6–21)
TSH SERPL-MCNC: 3.29 UIU/ML — SIGNIFICANT CHANGE UP (ref 0.27–4.2)
WBC # BLD: 6.81 K/UL — SIGNIFICANT CHANGE UP (ref 4.8–10.8)
WBC # FLD AUTO: 6.81 K/UL — SIGNIFICANT CHANGE UP (ref 4.8–10.8)

## 2025-02-24 PROCEDURE — 93970 EXTREMITY STUDY: CPT | Mod: 26

## 2025-02-24 PROCEDURE — 93306 TTE W/DOPPLER COMPLETE: CPT | Mod: 26

## 2025-02-24 PROCEDURE — 99233 SBSQ HOSP IP/OBS HIGH 50: CPT

## 2025-02-24 RX ADMIN — HEPARIN SODIUM 1500 UNIT(S)/HR: 1000 INJECTION INTRAVENOUS; SUBCUTANEOUS at 00:45

## 2025-02-24 RX ADMIN — OXCARBAZEPINE 300 MILLIGRAM(S): 150 TABLET, FILM COATED ORAL at 17:46

## 2025-02-24 RX ADMIN — INSULIN LISPRO 1: 100 INJECTION, SOLUTION INTRAVENOUS; SUBCUTANEOUS at 11:53

## 2025-02-24 RX ADMIN — HEPARIN SODIUM 1700 UNIT(S)/HR: 1000 INJECTION INTRAVENOUS; SUBCUTANEOUS at 15:23

## 2025-02-24 RX ADMIN — INSULIN LISPRO 2 UNIT(S): 100 INJECTION, SOLUTION INTRAVENOUS; SUBCUTANEOUS at 17:47

## 2025-02-24 RX ADMIN — HEPARIN SODIUM 1700 UNIT(S)/HR: 1000 INJECTION INTRAVENOUS; SUBCUTANEOUS at 23:31

## 2025-02-24 RX ADMIN — INSULIN GLARGINE-YFGN 8 UNIT(S): 100 INJECTION, SOLUTION SUBCUTANEOUS at 21:50

## 2025-02-24 RX ADMIN — Medication 2 TABLET(S): at 21:50

## 2025-02-24 RX ADMIN — Medication 300 MILLIGRAM(S): at 11:21

## 2025-02-24 RX ADMIN — AMLODIPINE BESYLATE 2.5 MILLIGRAM(S): 10 TABLET ORAL at 06:03

## 2025-02-24 RX ADMIN — Medication 81 MILLIGRAM(S): at 11:20

## 2025-02-24 RX ADMIN — ATORVASTATIN CALCIUM 40 MILLIGRAM(S): 80 TABLET, FILM COATED ORAL at 21:50

## 2025-02-24 RX ADMIN — METOPROLOL SUCCINATE 25 MILLIGRAM(S): 50 TABLET, EXTENDED RELEASE ORAL at 06:03

## 2025-02-24 RX ADMIN — OXCARBAZEPINE 300 MILLIGRAM(S): 150 TABLET, FILM COATED ORAL at 06:03

## 2025-02-24 RX ADMIN — INSULIN LISPRO 1: 100 INJECTION, SOLUTION INTRAVENOUS; SUBCUTANEOUS at 17:47

## 2025-02-24 RX ADMIN — HEPARIN SODIUM 1700 UNIT(S)/HR: 1000 INJECTION INTRAVENOUS; SUBCUTANEOUS at 08:02

## 2025-02-24 RX ADMIN — INSULIN LISPRO 2 UNIT(S): 100 INJECTION, SOLUTION INTRAVENOUS; SUBCUTANEOUS at 11:53

## 2025-02-24 RX ADMIN — FUROSEMIDE 20 MILLIGRAM(S): 10 INJECTION INTRAMUSCULAR; INTRAVENOUS at 06:03

## 2025-02-24 NOTE — PROGRESS NOTE ADULT - ASSESSMENT
84-year-old-year-old male with a pmhx of DM, HTN, HLD, GAURAV, CAD s/p PCI of distal circumflex (2020), seizure (2017 once) presenting with midsternal chest pain associated with sob that history of diabetes, hypertension, hyperlipidemia presenting to ER for evaluation of acute onset chest pain x~ 1 hour. Symptoms resolved since presentations     CAD s/p stent to Lt circumflex in 2020 now with chest pain concerning for NSTEMI  ACUTE PE  - CTA CHEST: Pulmonary embolus present in anterior segment of the right upper lobe  - ECG with TWI inferior leads  - s/p ASA loading, continue on asa 81mg qd and Heparin drip. Ptts per RN protocols  - overnight team consulted cardio, who recommended above  - Troponins still uptrending, patient without CP/SOB  - pending cardio consult Dr Reed/Sherley  - fu TTE  - Small PE on CTA, suspect " incidental" as pt not tachy/not requiring any supplemental 02  - check LE duplex, c/w AC as above, NOAC on dc    CKDIIIA  -Cr at baseline  -c/w Furosemide 20mg OD    DM  -check a1c  -start insulin regimen and adjust based on FS    All discussed at length with patient and wife at bedside  Pending: trend trops, c/w heparin drip, fu echo, LE duplex, cardio eval    Patient seen at bedside, total time spent to evaluate and treat the patient's acute illness and chronic medical conditions as well as time spent reviewing prior records, labs, radiology, documenting in electronic medical records,  discussing medical plan with   medical team was 50  minutes with >50% of time spent face to face with patient, discussing with patient/family as well as coordination of care

## 2025-02-24 NOTE — PROGRESS NOTE ADULT - SUBJECTIVE AND OBJECTIVE BOX
RAHAT MARTINEZ  84y  Male      Patient is a 84y old  Male who presents with a chief complaint of     INTERVAL HPI/OVERNIGHT EVENTS:    No overnight complaints    REVIEW OF SYSTEMS:    ROS negative    Vital Signs Last 24 Hrs  T(C): 36.3 (24 Feb 2025 07:45), Max: 36.7 (23 Feb 2025 20:07)  T(F): 97.4 (24 Feb 2025 07:45), Max: 98 (23 Feb 2025 20:07)  HR: 64 (24 Feb 2025 07:45) (58 - 77)  BP: 148/82 (24 Feb 2025 07:45) (126/72 - 156/87)  BP(mean): 110 (24 Feb 2025 00:04) (110 - 110)  RR: 18 (24 Feb 2025 07:45) (18 - 18)  SpO2: 100% (24 Feb 2025 07:45) (96% - 100%)    Parameters below as of 24 Feb 2025 07:45  Patient On (Oxygen Delivery Method): room air        PHYSICAL EXAM:  GENERAL: NAD, well-groomed, well-developed  HEAD:  Atraumatic, Normocephalic  EYES: EOMI, PERRLA, conjunctiva and sclera clear  ENMT: Moist mucous membranes, Good dentition, No lesions  NECK: Supple, No JVD, Normal thyroid  CHEST/LUNG: Clear to auscultation bilaterally; No rales, rhonchi, wheezing, or rubs  HEART: Regular rate and rhythm; No murmurs, rubs, or gallops  ABDOMEN: Soft, Nontender, Nondistended; Bowel sounds present  EXTREMITIES:  2+ Peripheral Pulses, No clubbing, cyanosis, or edema  SKIN: No rashes or lesions    Consultant(s) Notes Reviewed:  [x ] YES  [ ] NO  Care Discussed with Consultants/Other Providers [ x] YES  [ ] NO    LABS:                        14.6   6.81  )-----------( 83       ( 24 Feb 2025 05:35 )             44.1     02-24    140  |  102  |  25[H]  ----------------------------<  150[H]  4.0   |  26  |  1.5    Ca    9.2      24 Feb 2025 05:35  Mg     2.0     02-24    TPro  6.9  /  Alb  4.7  /  TBili  0.5  /  DBili  x   /  AST  38  /  ALT  32  /  AlkPhos  100  02-23    PT/INR - ( 23 Feb 2025 06:57 )   PT: 11.00 sec;   INR: 0.93 ratio         PTT - ( 24 Feb 2025 06:20 )  PTT:56.3 sec  Urinalysis Basic - ( 24 Feb 2025 05:35 )    Color: x / Appearance: x / SG: x / pH: x  Gluc: 150 mg/dL / Ketone: x  / Bili: x / Urobili: x   Blood: x / Protein: x / Nitrite: x   Leuk Esterase: x / RBC: x / WBC x   Sq Epi: x / Non Sq Epi: x / Bacteria: x        CAPILLARY BLOOD GLUCOSE      POCT Blood Glucose.: 175 mg/dL (24 Feb 2025 11:33)  POCT Blood Glucose.: 142 mg/dL (24 Feb 2025 09:41)  POCT Blood Glucose.: 76 mg/dL (23 Feb 2025 22:27)  POCT Blood Glucose.: 200 mg/dL (23 Feb 2025 17:59)      RADIOLOGY & ADDITIONAL TESTS:    Imaging Personally Reviewed:  [ ] YES  [ ] NO

## 2025-02-24 NOTE — PATIENT PROFILE ADULT - FALL HARM RISK - HARM RISK INTERVENTIONS

## 2025-02-24 NOTE — PROGRESS NOTE ADULT - SUBJECTIVE AND OBJECTIVE BOX
RAHAT MARTINEZ  84y Male    CHIEF COMPLAINT:    Patient is a 84y old  Male who presents with a chief complaint of     INTERVAL HPI/OVERNIGHT EVENTS:    Patient seen and examined. No acute events overnight. Clinically no complaints     ROS: All other systems are negative.    Vital Signs:    T(F): 97.4 (02-24-25 @ 07:45), Max: 98 (02-23-25 @ 20:07)  HR: 64 (02-24-25 @ 07:45) (58 - 77)  BP: 148/82 (02-24-25 @ 07:45) (126/72 - 156/87)  RR: 18 (02-24-25 @ 07:45) (18 - 18)  SpO2: 100% (02-24-25 @ 07:45) (96% - 100%)    23 Feb 2025 07:01  -  24 Feb 2025 07:00  --------------------------------------------------------  IN: 0 mL / OUT: 650 mL / NET: -650 mL    POCT Blood Glucose.: 175 mg/dL (24 Feb 2025 11:33)  POCT Blood Glucose.: 142 mg/dL (24 Feb 2025 09:41)  POCT Blood Glucose.: 76 mg/dL (23 Feb 2025 22:27)  POCT Blood Glucose.: 200 mg/dL (23 Feb 2025 17:59)    PHYSICAL EXAM:    GENERAL:  NAD  SKIN: No rashes or lesions  HEENT: Atraumatic. Normocephalic.    NECK: Supple, No JVD.    PULMONARY: CTA B/L. No wheezing.    CVS: Normal S1, S2. Rate and Rhythm are regular.   ABDOMEN/GI: Soft, Nontender, Nondistended   MSK:  No clubbing or cyanosis   NEUROLOGIC:  moves all extremities   PSYCH: Alert & oriented x 3, normal affect    Consultant(s) Notes Reviewed:  [x ] YES  [ ] NO  Care Discussed with Consultants/Other Providers [ x] YES  [ ] NO    LABS:                        14.6   6.81  )-----------( 83       ( 24 Feb 2025 05:35 )             44.1     140  |  102  |  25[H]  ----------------------------<  150[H]  4.0   |  26  |  1.5    Ca    9.2      24 Feb 2025 05:35  Mg     2.0     02-24    TPro  6.9  /  Alb  4.7  /  TBili  0.5  /  DBili  x   /  AST  38  /  ALT  32  /  AlkPhos  100  02-23    PT/INR - ( 23 Feb 2025 06:57 )   PT: 11.00 sec;   INR: 0.93 ratio      PTT - ( 24 Feb 2025 06:20 )  PTT:56.3 sec    RADIOLOGY & ADDITIONAL TESTS:  Imaging or report Personally Reviewed:  [x] YES  [ ] NO  EKG reviewed: [x] YES  [ ] NO    Medications:  Standing  allopurinol 300 milliGRAM(s) Oral daily  amLODIPine   Tablet 2.5 milliGRAM(s) Oral daily  aspirin  chewable 81 milliGRAM(s) Oral daily  atorvastatin 40 milliGRAM(s) Oral at bedtime  dextrose 5%. 1000 milliLiter(s) IV Continuous <Continuous>  dextrose 5%. 1000 milliLiter(s) IV Continuous <Continuous>  dextrose 50% Injectable 25 Gram(s) IV Push once  dextrose 50% Injectable 12.5 Gram(s) IV Push once  dextrose 50% Injectable 25 Gram(s) IV Push once  furosemide    Tablet 20 milliGRAM(s) Oral daily  glucagon  Injectable 1 milliGRAM(s) IntraMuscular once  heparin  Infusion.  Unit(s)/Hr IV Continuous <Continuous>  insulin glargine Injectable (LANTUS) 8 Unit(s) SubCutaneous at bedtime  insulin lispro (ADMELOG) corrective regimen sliding scale   SubCutaneous three times a day before meals  insulin lispro Injectable (ADMELOG) 2 Unit(s) SubCutaneous three times a day before meals  metoprolol succinate ER 25 milliGRAM(s) Oral daily  OXcarbazepine 300 milliGRAM(s) Oral two times a day  senna 2 Tablet(s) Oral at bedtime    PRN Meds  dextrose Oral Gel 15 Gram(s) Oral once PRN

## 2025-02-24 NOTE — PROGRESS NOTE ADULT - ASSESSMENT
Assessment:  · Assessment	  #chest pain   #ACUTE PE  #Suspected NSTEMI  - rapid resolution of pain is not suggestive of PE related pain  - CTA CHEST: Pulmonary embolus present in anterior segment of the right upper lobe  - ECG with TWI inferior leads  - s/p ASA loading on ASA 81 mg qd  - Trop still increasing  - c/w Heparin drip as can address both etiologies for now  - check TTE, lipid panel, a1c, TSH, and duplex  - fu on cardiology recommendations  - c/w ASA, Statin  - hold off starting another AP pending cardiology eval (chest pain completely resolved)  - PT consult for evaluation of limited ambulation   - Needs age appropriate cancer screening as outpatient   - check duplex venous LL    #CKDIIIA  -Cr at baseline  -c/w Furosemide 20mg OD    #DM  -check a1c  -start insulin regimen and adjust based on FS      #DVT PPX: On heparin drip  #AAT: PT eval  Dispo: Tele  From Home with no needs

## 2025-02-25 LAB
ANION GAP SERPL CALC-SCNC: 13 MMOL/L — SIGNIFICANT CHANGE UP (ref 7–14)
APTT BLD: 131.3 SEC — CRITICAL HIGH (ref 27–39.2)
APTT BLD: 94.2 SEC — CRITICAL HIGH (ref 27–39.2)
APTT BLD: 97.1 SEC — CRITICAL HIGH (ref 27–39.2)
BUN SERPL-MCNC: 27 MG/DL — HIGH (ref 10–20)
CALCIUM SERPL-MCNC: 8.5 MG/DL — SIGNIFICANT CHANGE UP (ref 8.4–10.5)
CHLORIDE SERPL-SCNC: 102 MMOL/L — SIGNIFICANT CHANGE UP (ref 98–110)
CO2 SERPL-SCNC: 25 MMOL/L — SIGNIFICANT CHANGE UP (ref 17–32)
CREAT SERPL-MCNC: 1.5 MG/DL — SIGNIFICANT CHANGE UP (ref 0.7–1.5)
EGFR: 46 ML/MIN/1.73M2 — LOW
GLUCOSE BLDC GLUCOMTR-MCNC: 139 MG/DL — HIGH (ref 70–99)
GLUCOSE BLDC GLUCOMTR-MCNC: 153 MG/DL — HIGH (ref 70–99)
GLUCOSE BLDC GLUCOMTR-MCNC: 181 MG/DL — HIGH (ref 70–99)
GLUCOSE SERPL-MCNC: 131 MG/DL — HIGH (ref 70–99)
HCT VFR BLD CALC: 45.1 % — SIGNIFICANT CHANGE UP (ref 42–52)
HCT VFR BLD CALC: 47 % — SIGNIFICANT CHANGE UP (ref 42–52)
HGB BLD-MCNC: 14.8 G/DL — SIGNIFICANT CHANGE UP (ref 14–18)
HGB BLD-MCNC: 15.5 G/DL — SIGNIFICANT CHANGE UP (ref 14–18)
MCHC RBC-ENTMCNC: 31 PG — SIGNIFICANT CHANGE UP (ref 27–31)
MCHC RBC-ENTMCNC: 31.3 PG — HIGH (ref 27–31)
MCHC RBC-ENTMCNC: 32.8 G/DL — SIGNIFICANT CHANGE UP (ref 32–37)
MCHC RBC-ENTMCNC: 33 G/DL — SIGNIFICANT CHANGE UP (ref 32–37)
MCV RBC AUTO: 94.4 FL — HIGH (ref 80–94)
MCV RBC AUTO: 94.9 FL — HIGH (ref 80–94)
NRBC BLD AUTO-RTO: 0 /100 WBCS — SIGNIFICANT CHANGE UP (ref 0–0)
NRBC BLD AUTO-RTO: 0 /100 WBCS — SIGNIFICANT CHANGE UP (ref 0–0)
PLATELET # BLD AUTO: 109 K/UL — LOW (ref 130–400)
PLATELET # BLD AUTO: 90 K/UL — LOW (ref 130–400)
PMV BLD: 12.1 FL — HIGH (ref 7.4–10.4)
PMV BLD: 12.4 FL — HIGH (ref 7.4–10.4)
POTASSIUM SERPL-MCNC: 3.7 MMOL/L — SIGNIFICANT CHANGE UP (ref 3.5–5)
POTASSIUM SERPL-SCNC: 3.7 MMOL/L — SIGNIFICANT CHANGE UP (ref 3.5–5)
RBC # BLD: 4.78 M/UL — SIGNIFICANT CHANGE UP (ref 4.7–6.1)
RBC # BLD: 4.95 M/UL — SIGNIFICANT CHANGE UP (ref 4.7–6.1)
RBC # FLD: 13.3 % — SIGNIFICANT CHANGE UP (ref 11.5–14.5)
RBC # FLD: 13.4 % — SIGNIFICANT CHANGE UP (ref 11.5–14.5)
SODIUM SERPL-SCNC: 140 MMOL/L — SIGNIFICANT CHANGE UP (ref 135–146)
TROPONIN T, HIGH SENSITIVITY RESULT: 102 NG/L — CRITICAL HIGH (ref 6–21)
WBC # BLD: 6.92 K/UL — SIGNIFICANT CHANGE UP (ref 4.8–10.8)
WBC # BLD: 7.86 K/UL — SIGNIFICANT CHANGE UP (ref 4.8–10.8)
WBC # FLD AUTO: 6.92 K/UL — SIGNIFICANT CHANGE UP (ref 4.8–10.8)
WBC # FLD AUTO: 7.86 K/UL — SIGNIFICANT CHANGE UP (ref 4.8–10.8)

## 2025-02-25 PROCEDURE — 92978 ENDOLUMINL IVUS OCT C 1ST: CPT | Mod: 26,LD

## 2025-02-25 PROCEDURE — 99233 SBSQ HOSP IP/OBS HIGH 50: CPT

## 2025-02-25 PROCEDURE — 92928 PRQ TCAT PLMT NTRAC ST 1 LES: CPT | Mod: LD

## 2025-02-25 RX ORDER — HEPARIN SODIUM 1000 [USP'U]/ML
INJECTION INTRAVENOUS; SUBCUTANEOUS
Qty: 25000 | Refills: 0 | Status: DISCONTINUED | OUTPATIENT
Start: 2025-02-25 | End: 2025-02-26

## 2025-02-25 RX ORDER — CLOPIDOGREL BISULFATE 75 MG/1
75 TABLET, FILM COATED ORAL DAILY
Refills: 0 | Status: DISCONTINUED | OUTPATIENT
Start: 2025-02-26 | End: 2025-02-27

## 2025-02-25 RX ADMIN — ATORVASTATIN CALCIUM 40 MILLIGRAM(S): 80 TABLET, FILM COATED ORAL at 23:13

## 2025-02-25 RX ADMIN — Medication 75 MILLILITER(S): at 17:18

## 2025-02-25 RX ADMIN — INSULIN LISPRO 1: 100 INJECTION, SOLUTION INTRAVENOUS; SUBCUTANEOUS at 11:10

## 2025-02-25 RX ADMIN — HEPARIN SODIUM 0 UNIT(S)/HR: 1000 INJECTION INTRAVENOUS; SUBCUTANEOUS at 23:23

## 2025-02-25 RX ADMIN — OXCARBAZEPINE 300 MILLIGRAM(S): 150 TABLET, FILM COATED ORAL at 05:18

## 2025-02-25 RX ADMIN — Medication 81 MILLIGRAM(S): at 11:47

## 2025-02-25 RX ADMIN — Medication 100 MILLILITER(S): at 21:04

## 2025-02-25 RX ADMIN — HEPARIN SODIUM 1900 UNIT(S)/HR: 1000 INJECTION INTRAVENOUS; SUBCUTANEOUS at 15:29

## 2025-02-25 RX ADMIN — HEPARIN SODIUM 1900 UNIT(S)/HR: 1000 INJECTION INTRAVENOUS; SUBCUTANEOUS at 06:43

## 2025-02-25 RX ADMIN — OXCARBAZEPINE 300 MILLIGRAM(S): 150 TABLET, FILM COATED ORAL at 23:13

## 2025-02-25 RX ADMIN — FUROSEMIDE 20 MILLIGRAM(S): 10 INJECTION INTRAMUSCULAR; INTRAVENOUS at 05:18

## 2025-02-25 RX ADMIN — METOPROLOL SUCCINATE 25 MILLIGRAM(S): 50 TABLET, EXTENDED RELEASE ORAL at 05:18

## 2025-02-25 RX ADMIN — INSULIN LISPRO 2 UNIT(S): 100 INJECTION, SOLUTION INTRAVENOUS; SUBCUTANEOUS at 07:28

## 2025-02-25 RX ADMIN — AMLODIPINE BESYLATE 2.5 MILLIGRAM(S): 10 TABLET ORAL at 05:19

## 2025-02-25 RX ADMIN — INSULIN GLARGINE-YFGN 8 UNIT(S): 100 INJECTION, SOLUTION SUBCUTANEOUS at 23:12

## 2025-02-25 RX ADMIN — INSULIN LISPRO 1: 100 INJECTION, SOLUTION INTRAVENOUS; SUBCUTANEOUS at 07:29

## 2025-02-25 RX ADMIN — INSULIN LISPRO 2 UNIT(S): 100 INJECTION, SOLUTION INTRAVENOUS; SUBCUTANEOUS at 11:10

## 2025-02-25 RX ADMIN — Medication 300 MILLIGRAM(S): at 11:47

## 2025-02-25 RX ADMIN — Medication 500 MILLILITER(S): at 17:16

## 2025-02-25 NOTE — CHART NOTE - NSCHARTNOTEFT_GEN_A_CORE
PRE-OP DIAGNOSIS:  NSTEMI    PROCEDURE:   [x ] Coronary Angiogram   [x ] LHC   [ ] LVG   [ ] RHC   [x ] Intervention (see below)       PHYSICIAN:  Dr. Lepe  FELLOW: Guy Feliz     PROCEDURE DESCRIPTION:     Consent:    [x] Patient   [] Family Member   []  Used      Anesthesia:   [ ] General   [X] Sedation   [X] Local     Access & Closure:   [X] 6 Fr R Radial Artery  -> D-stat       IV Contrast: mL      Intervention:  Successful PCI of  with balloon angioplasty s/p MEHDI x    Implants:    to                  AUC:     FINDINGS:   Coronary Dominance: Right   LM:   LAD:   CX:   RCA:     LVEDP:  mmHg     EF:  60% (TTE 02/25/25)       ESTIMATED BLOOD LOSS: < 10 mL      CONDITION:   [x] Good   [ ] Fair   [ ] Critical     SPECIMEN REMOVED: N/A     POST-OP DIAGNOSIS:    [ ] Normal Coronary Angiogram   [ ] Mild Coronary Artery Disease (< 50% stenosis)   [ ] 1-  2-  3- Vessel Coronary Artery Disease      PLAN OF CARE:   [ ] D/C Home Today   [ ] Return to In-patient bed   [ ] Admit for observation   [ ] Return for Staged Procedure in 4-6 weeks   [ ] CT Surgery Consult   [X] Medications: ASA,  statin  [X] IV Fluids: NS @ 150cc/h x 6 hours  [x] Remove D-stat PRE-OP DIAGNOSIS:  NSTEMI    PROCEDURE:   [x ] Coronary Angiogram   [x ] LHC   [ ] LVG   [ ] RHC   [x ] Intervention (see below)       PHYSICIAN:  Dr. Lepe  FELLOW: Guy Feliz     PROCEDURE DESCRIPTION:     Consent:    [x] Patient   [] Family Member   []  Used      Anesthesia:   [ ] General   [X] Sedation   [X] Local     Access & Closure:   [X] 6 Fr R Radial Artery  -> D-stat       IV Contrast: mL      Intervention:  Successful PCI of  with balloon angioplasty s/p MEHDI x    Implants: SYNERGY XD 3.5X20MM              AUC:     FINDINGS:   Coronary Dominance: Right   LM:   LAD:   CX:   RCA:     LVEDP:  mmHg     EF:  60% (TTE 02/25/25)       ESTIMATED BLOOD LOSS: < 10 mL      CONDITION:   [x] Good   [ ] Fair   [ ] Critical     SPECIMEN REMOVED: N/A     POST-OP DIAGNOSIS:    [ ] Normal Coronary Angiogram   [ ] Mild Coronary Artery Disease (< 50% stenosis)   [ ] 1-  2-  3- Vessel Coronary Artery Disease      PLAN OF CARE:   [ ] D/C Home Today   [ ] Return to In-patient bed   [ ] Admit for observation   [ ] Return for Staged Procedure in 4-6 weeks   [ ] CT Surgery Consult   [X] Medications: ASA,  statin  [X] IV Fluids: NS @ 150cc/h x 6 hours  [x] Remove D-stat PRE-OP DIAGNOSIS:  NSTEMI    PROCEDURE:   [x ] Coronary Angiogram   [x ] LHC   [ ] LVG   [ ] RHC   [x ] Intervention (see below)       PHYSICIAN:  Dr. Lepe  FELLOW: Guy Feliz     PROCEDURE DESCRIPTION:     Consent:    [x] Patient   [] Family Member   []  Used      Anesthesia:   [ ] General   [X] Sedation   [X] Local     Access & Closure:   [X] 6 Fr R Femoral Artery  -> Perclose       IV Contrast: 140 mL      Intervention:  Successful PCI of  with balloon angioplasty s/p MEHDI x    Implants: SYNERGY XD 3.5X20MM              AUC: 8     FINDINGS:   Coronary Dominance: Right   LM: mild   LAD: 50% ISR of pLAD, 70% stenosis of mLAD  CX: mod disease   RCA: small non dominant;  of pRCA     LVEDP: 12 mmHg     EF:  60% (TTE 02/25/25)       ESTIMATED BLOOD LOSS: < 10 mL      CONDITION:   [x] Good   [ ] Fair   [ ] Critical     SPECIMEN REMOVED: N/A     POST-OP DIAGNOSIS:    [X] 2- Vessel Coronary Artery Disease (70% mLAD;  of pRCA)     PLAN OF CARE:   [X] Return to In-patient bed   [X] Medications: DAPT, statin  [X] IV Fluids: NS @ 120cc/h x 6 hours

## 2025-02-25 NOTE — PROGRESS NOTE ADULT - SUBJECTIVE AND OBJECTIVE BOX
Vitals:  T(C): 37 (02-25-25 @ 17:07), Max: 37 (02-25-25 @ 17:07)  HR: 54 (02-25-25 @ 17:07) (54 - 64)  BP: 125/72 (02-25-25 @ 17:07) (115/71 - 156/87)  RR: 18 (02-25-25 @ 17:07) (18 - 18)  SpO2: 96% (02-25-25 @ 17:07) (96% - 100%)  ECG:    LABS:                        15.5   7.86  )-----------( 109      ( 25 Feb 2025 13:26 )             47.0     02-24    140  |  102  |  25[H]  ----------------------------<  150[H]  4.0   |  26  |  1.5    Ca    9.2      24 Feb 2025 05:35  Mg     2.0     02-24      PTT - ( 25 Feb 2025 12:20 )  PTT:97.1 sec  MEDICATIONS  (STANDING):  allopurinol 300 milliGRAM(s) Oral daily  amLODIPine   Tablet 2.5 milliGRAM(s) Oral daily  aspirin  chewable 81 milliGRAM(s) Oral daily  atorvastatin 40 milliGRAM(s) Oral at bedtime  dextrose 5%. 1000 milliLiter(s) (100 mL/Hr) IV Continuous <Continuous>  dextrose 5%. 1000 milliLiter(s) (50 mL/Hr) IV Continuous <Continuous>  dextrose 50% Injectable 25 Gram(s) IV Push once  dextrose 50% Injectable 12.5 Gram(s) IV Push once  dextrose 50% Injectable 25 Gram(s) IV Push once  furosemide    Tablet 20 milliGRAM(s) Oral daily  glucagon  Injectable 1 milliGRAM(s) IntraMuscular once  heparin  Infusion.  Unit(s)/Hr (19 mL/Hr) IV Continuous <Continuous>  insulin glargine Injectable (LANTUS) 8 Unit(s) SubCutaneous at bedtime  insulin lispro (ADMELOG) corrective regimen sliding scale   SubCutaneous three times a day before meals  insulin lispro Injectable (ADMELOG) 2 Unit(s) SubCutaneous three times a day before meals  metoprolol succinate ER 25 milliGRAM(s) Oral daily  OXcarbazepine 300 milliGRAM(s) Oral two times a day  senna 2 Tablet(s) Oral at bedtime  sodium chloride 0.9%. 1000 milliLiter(s) (75 mL/Hr) IV Continuous <Continuous>    MEDICATIONS  (PRN):  dextrose Oral Gel 15 Gram(s) Oral once PRN Blood Glucose LESS THAN 70 milliGRAM(s)/deciliter      PHYSICAL EXAM:  Constitutional: appears stated age, well developed/nourished, no acute distress  Eyes: EOM's intact.  PERRLA  ENMT: Normocephalic, atraumatic.   Neck: Jugular veins non-distended; no carotid bruits bilaterally.  Respiratory: respiratory pattern unlabored; no dullness to percussion; lungs clear to auscultation bilaterally.  Cardiovascular: Regular rhythm.  S1 and S2 normal.  No murmur nor rub appreciated.  Abdomen: Soft, non-tender.  Normal bowel sounds.  Extremities: extremities warm; no edema.  Pulses: Intact bilaterally  Skin: No gross abnormalities noted.  Musculoskeletal: No gross deformities  Neurological: Alert, oriented x 3.  No focal neurologic deficits noted.           Patient was seen and examined earlier in the Cath Lab.    He is comfortable in bed.  No new complaints.  He is S/P PCI of LAD.    Vitals:  T(C): 37 (02-25-25 @ 17:07), Max: 37 (02-25-25 @ 17:07)  HR: 54 (02-25-25 @ 17:07) (54 - 64)  BP: 125/72 (02-25-25 @ 17:07) (115/71 - 156/87)  RR: 18 (02-25-25 @ 17:07) (18 - 18)  SpO2: 96% (02-25-25 @ 17:07) (96% - 100%)    Telemetry: Sinus Rhythm    LABS:                        15.5   7.86  )-----------( 109      ( 25 Feb 2025 13:26 )             47.0     02-24    140  |  102  |  25[H]  ----------------------------<  150[H]  4.0   |  26  |  1.5    Ca    9.2      24 Feb 2025 05:35  Mg     2.0     02-24      PTT - ( 25 Feb 2025 12:20 )  PTT:97.1 sec  MEDICATIONS  (STANDING):  allopurinol 300 milliGRAM(s) Oral daily  amLODIPine   Tablet 2.5 milliGRAM(s) Oral daily  aspirin  chewable 81 milliGRAM(s) Oral daily  atorvastatin 40 milliGRAM(s) Oral at bedtime  dextrose 5%. 1000 milliLiter(s) (100 mL/Hr) IV Continuous <Continuous>  dextrose 5%. 1000 milliLiter(s) (50 mL/Hr) IV Continuous <Continuous>  dextrose 50% Injectable 25 Gram(s) IV Push once  dextrose 50% Injectable 12.5 Gram(s) IV Push once  dextrose 50% Injectable 25 Gram(s) IV Push once  furosemide    Tablet 20 milliGRAM(s) Oral daily  glucagon  Injectable 1 milliGRAM(s) IntraMuscular once  heparin  Infusion.  Unit(s)/Hr (19 mL/Hr) IV Continuous <Continuous>  insulin glargine Injectable (LANTUS) 8 Unit(s) SubCutaneous at bedtime  insulin lispro (ADMELOG) corrective regimen sliding scale   SubCutaneous three times a day before meals  insulin lispro Injectable (ADMELOG) 2 Unit(s) SubCutaneous three times a day before meals  metoprolol succinate ER 25 milliGRAM(s) Oral daily  OXcarbazepine 300 milliGRAM(s) Oral two times a day  senna 2 Tablet(s) Oral at bedtime  sodium chloride 0.9%. 1000 milliLiter(s) (75 mL/Hr) IV Continuous <Continuous>    MEDICATIONS  (PRN):  dextrose Oral Gel 15 Gram(s) Oral once PRN Blood Glucose LESS THAN 70 milliGRAM(s)/deciliter      PHYSICAL EXAM:  Constitutional: appears stated age, well developed/nourished, no acute distress  Eyes: EOM's intact.  PERRLA  ENMT: Normocephalic, atraumatic.   Neck: Jugular veins non-distended; no carotid bruits bilaterally.  Respiratory: respiratory pattern unlabored; no dullness to percussion; lungs clear to auscultation bilaterally.  Cardiovascular: Regular rhythm.  S1 and S2 normal.  No murmur nor rub appreciated.  Abdomen: Soft, non-tender.  Normal bowel sounds.  Extremities: extremities warm; no edema.  Pulses: Intact bilaterally  Skin: No gross abnormalities noted.  Musculoskeletal: No gross deformities  Neurological: Alert, oriented x 3.  No focal neurologic deficits noted.

## 2025-02-25 NOTE — PHYSICAL THERAPY INITIAL EVALUATION ADULT - GENERAL OBSERVATIONS, REHAB EVAL
Patient was seen from 8:26-8:53 for PT IE. Patient was rec'd in semi reclined in bed, +IV heparin, +IV fluid, +Tele, NAD, agreeable to participate in PT.

## 2025-02-25 NOTE — CONSULT NOTE ADULT - SUBJECTIVE AND OBJECTIVE BOX
This is a late entry.  Patient was seen and examined by me in Echo Lab in the morning of 2025.  EMR reviewed.      < from: TTE Echo Complete w/o Contrast w/ Doppler (25 @ 09:29) >  TRANSTHORACIC ECHOCARDIOGRAM REPORT        Patient Name:   RAHAT MARTINEZ Accession #: [id number]  Medical Rec #:  [id number]          Height:      68.0 in 172.7 cm  YOB: 1940         Weight:      230.0 lb 104.33 kg  Patient Age:    84 years          BSA:         2.17 m²  Patient Gender: M                 BP:          148/82 mmHg      Date of Exam:        2025 9:29:40 AM  Referring Physician: SANA FIGUEROA  Sonographer:         Manpreet  Fellow:              Aristeo Feliz  Reading Physician:   Jani Martinez.    Procedure:     2D Echo/Doppler/Color Doppler Complete and Echocardiogram   with                 Definity Contrast.  Indications:   R94.31 - Abnormal Electrocardiogram [ECG] [EKG]  Diagnosis:     R94.31 - Abnormal electrocardiogram ECG/EKG  Study Details: Technically difficult study. Study quality was adversely   affected                 due to patient obesity, body habitus and COPD.        Summary:   1. Technically difficult study.   2. Normal global left ventricular systolic function.   3. Left ventricular ejection fraction, by visual estimation, is 60 to   65%.   4. Spectral Doppler shows pseudonormal pattern of left ventricular   myocardial filling (Grade II diastolic dysfunction). Elevated mean left   atrial pressure.   5. Moderately enlarged right ventricle. Mildly reduced RV systolic   function.   6. Normal left atrial size.   7. Normal right atrial size.   8. Sclerotic aortic valve with normal opening.   9. Mild tricuspid regurgitation.  10. Trivial pericardial effusion.  11. Endocardial visualization was enhanced with intravenous echo contrast.    PHYSICIAN INTERPRETATION:  Left Ventricle: Endocardial visualization wasenhanced with intravenous   echo contrast. The left ventricular internal cavity size is normal. Left   ventricular wall thickness is normal. Global LV systolic function was   normal. Left ventricular ejection fraction, by visual estimation, is 60   to 65%. Spectral Doppler shows pseudonormal pattern of left ventricular   myocardial filling (Grade II diastolic dysfunction). Elevated mean left   atrial pressure.  Right Ventricle: The right ventricular size is moderately enlarged. RV   systolic function is mildly reduced.  Left Atrium: Normal left atrial size.  Right Atrium: Normal right atrial size.  Pericardium: Trivial pericardial effusion is present.  Mitral Valve: Mild thickening of the anterior and posterior mitral valve   leaflets. No evidence of mitral stenosis. Trace mitral valve   regurgitation is seen.  Tricuspid Valve: Structurally normal tricuspid valve, with normal leaflet   excursion. Mild tricuspid regurgitation is visualized.  Aortic Valve: No evidence of aortic stenosis. Sclerotic aortic valve with   normal opening. No evidence of aortic valve regurgitation is seen.  Pulmonic Valve: The pulmonic valve was not well visualized.  Aorta: The aortic root and ascending aorta are structurally normal, with   no evidence of dilitation.  Venous: The inferior vena cava is not well visualized.  In comparison to the previous echocardiogram(s): There are no prior   studies on this patient for comparison purposes.      2D AND M-MODE MEASUREMENTS (normal ranges within parentheses):  Left Ventricle:                  Normal   Aorta/Left Atrium:            Normal  IVSd (2D):              1.00 cm (0.7-1.1) Aortic Root (2D):  3.70 cm   (2.4-3.7)  LVPWd (2D):             1.20 cm (0.7-1.1)  LVIDd (2D):             5.10 cm (3.4-5.7)  LVIDs (2D):             3.10 cm  LV FS (2D):             39.2 %   (>25%)  Relative Wall Thickness  0.47    (<0.42)    SPECTRAL DOPPLER ANALYSIS:  LV DIASTOLIC FUNCTION:  MV Peak E: 0.72 m/s Decel Time: 322 msec  MV Peak A: 1.29 m/s  E/A Ratio: 0.56    Aortic Valve:  AoV VMax:    1.95 m/s  AoV Area, Vmax:    1.34 cm² Vmax Indx:    0.62   cm²/m²  AoV VTI:     0.47 m    AoV Area, VTI:     1.40 cm² VTI Indx:     0.65   cm²/m²  AoV Pk Grad: 15.2 mmHg AoV Area, Mn Grad: 1.27 cm² Mn Grad Indx: 0.58   cm²/m²  AoV Mn Grad: 8.0 mmHg    LVOT Vmax: 0.83 m/s  LVOT VTI:  0.21 m  LVOT Diam: 2.00 cm    Mitral Valve:  MV P1/2 Time: 93.38 msec  MV Area, PHT: 2.36 cm²    Tricuspid Valve and PA/RV Systolic Pressure: TR Max Velocity: 2.69 m/s RA   Pressure:  RVSP/PASP:    Pulmonic Valve:  PV Max Velocity: 1.06 m/s PV Max P.5 mmHg PV Mean P Jani Martinez, Electronically signed on 2025 at 1:22:11 PM      Guy Feliz        *** Final ***    < end of copied text >        < from: CT Angio Chest PE Protocol w/ IV Cont (25 @ 05:58) >  INTERPRETATION:  CLINICAL STATEMENT: PE study    TECHNIQUE: Multislice helical sections were obtained from the thoracic   inlet to the lung bases during rapid administration of intravenous   contrast using a CTA protocol. Thin sections were reconstructed through   the pulmonary vasculature. Sagittal and coronal reformatted images were   acquired, as well as MIP reconstructed images.    CONTRAST VOLUME: Omnipaque 350. 70 cc administered. 30 cc discarded.    COMPARISON CT: None.    QUALITY STATEMENT: Patient upper extremities causing streak artifact   inherently degrades image quality and limits this exam.    FINDINGS:    Pulmonary Embolus: Pulmonary embolus present in Anterior segment of the   right upper lobe (series 4 image 87)..    Mediastinum/Lymph Nodes: No lymphadenopathy by size criteria..    Heart/Great Vessels: Right heart is relatively larger than the left. No   pericardial effusion. Normal caliber aorta. Multivessel coronary arterial   calcifications. Mitral annular calcification.    Abdomen: Limited evaluation based on technique. No acute inflammatory   changes. Partially imaged right renal cyst.    Lungs, Pleura, and Airways: Central airways are patent. No mass or   consolidation. No pneumothorax or pleural effusion.    Bones and soft tissues: No aggressive osseous lesion. Osteopenia. Bony   degenerative changes.      IMPRESSION:    Pulmonary embolus present in anterior segment of the right upper lobe   (series 4 image 87).    Right heart is relatively larger than the left. Given the small clot   burden, it is doubtful that this reflects acute right heart strain and   may reflect chronic changes. Echo for evaluation of right heart strain is   suggested.    --- End of Report ---            < end of copied text >            Patient is a 84y old  Male who presents with a chief complaint of chest pain.    REASON FOR CONSULT     HPI:  84-year-old-year-old male history of diabetes, hypertension, hyperlipidemia presenting to ER for evaluation of 1 day of chest pain.  States since 11 PM last night has had midsternal chest pain described as a constant dull ache with associated shortness of breath.  Symptoms were worse with inspiration. Chest pain lasted around 45 mins and gradually subsided after arrival to ED. He has no associated fever, chills, cough, URI symptoms, leg pain or swelling, abdominal pain, nausea, vomiting or diarrhea. To note patient has sedentary lifestyle and limited mobility secondary to shortness of breath on exertion and peripheral neuropathy.    On presentation vitals:  · BP Systolic	 174 mm Hg  · BP Diastolic	89 mm Hg  · Heart Rate	76 /min  · Respiration Rate (breaths/min)	20 /min  · Temp (F)	97.6 Degrees F  · Temp (C)	36.4 Degrees C  · Temp site	oral  · SpO2 (%)	98 %  · O2 Delivery/Oxygen Delivery Method	room air    Labs are significant for Trop 39> 48, Cr 1.6.  EKG normal sinus rhythm, normal axis, Q waves and T wave inversions inferiorly, no ST depression or elevation.    CTA:  Pulmonary embolus present in anterior segment of the right upper lobe (series 4 image 87).  Right heart is relatively larger than the left. Given the small clot burden, it is doubtful that this reflects acute right heart strain and may reflect chronic changes. Echo for evaluation of right heart strain is suggested.    Patient admitted to medicine for PE and suspected NSTEMI.     (2025 17:16)      PAST MEDICAL & SURGICAL HISTORY:  GAURAV on CPAP  DM (diabetes mellitus)  Cataract  HTN (hypertension)  Seizure  x1  2017  Gout  H/O heart artery stent  x1  H/O carpal tunnel repair  right hand  History of tonsillectomy    SOCIAL HISTORY:     FAMILY HISTORY:    No Known Allergies      MEDICATIONS  (STANDING):  allopurinol 300 milliGRAM(s) Oral daily  amLODIPine   Tablet 2.5 milliGRAM(s) Oral daily  aspirin  chewable 81 milliGRAM(s) Oral daily  atorvastatin 40 milliGRAM(s) Oral at bedtime  dextrose 5%. 1000 milliLiter(s) (100 mL/Hr) IV Continuous <Continuous>  dextrose 5%. 1000 milliLiter(s) (50 mL/Hr) IV Continuous <Continuous>  dextrose 50% Injectable 25 Gram(s) IV Push once  dextrose 50% Injectable 12.5 Gram(s) IV Push once  dextrose 50% Injectable 25 Gram(s) IV Push once  furosemide    Tablet 20 milliGRAM(s) Oral daily  glucagon  Injectable 1 milliGRAM(s) IntraMuscular once  heparin  Infusion.  Unit(s)/Hr (19 mL/Hr) IV Continuous <Continuous>  insulin glargine Injectable (LANTUS) 8 Unit(s) SubCutaneous at bedtime  insulin lispro (ADMELOG) corrective regimen sliding scale   SubCutaneous three times a day before meals  insulin lispro Injectable (ADMELOG) 2 Unit(s) SubCutaneous three times a day before meals  metoprolol succinate ER 25 milliGRAM(s) Oral daily  OXcarbazepine 300 milliGRAM(s) Oral two times a day  senna 2 Tablet(s) Oral at bedtime    MEDICATIONS  (PRN):  dextrose Oral Gel 15 Gram(s) Oral once PRN Blood Glucose LESS THAN 70 milliGRAM(s)/deciliter      Vital Signs Last 24 Hrs  T(C): 36.5 (2025 04:38), Max: 36.8 (2025 21:03)  T(F): 97.7 (2025 04:38), Max: 98.3 (2025 21:03)  HR: 56 (2025 04:38) (56 - 64)  BP: 129/77 (2025 04:38) (115/71 - 129/77)  BP(mean): --  RR: 18 (2025 04:38) (18 - 18)  SpO2: 96% (2025 04:38) (96% - 97%)    Parameters below as of 2025 04:38  Patient On (Oxygen Delivery Method): room air     I&O's Detail    2025 07:01  -  2025 07:00  --------------------------------------------------------  IN:    Heparin Infusion: 34 mL    Oral Fluid: 118 mL  Total IN: 152 mL    OUT:    Voided (mL): 625 mL  Total OUT: 625 mL    Total NET: -473 mL      LABS:                        14.8   6.92  )-----------( 90       ( 2025 04:46 )             45.1     02-24    140  |  102  |  25[H]  ----------------------------<  150[H]  4.0   |  26  |  1.5    Ca    9.2      2025 05:35  Mg     2.0     -          PTT - ( 2025 04:46 )  PTT:94.2 sec  I&O's Summary    2025 07:01  -  2025 07:00  --------------------------------------------------------  IN: 152 mL / OUT: 625 mL / NET: -473 mL         This is a late entry.  Patient was seen and examined by me in Echo Lab in the morning of 2025.  EMR reviewed.    Mr. Bang Martinez is an 84-year-old  male with a past medical history of CAD S/P PTCA/Stent (      < from: TTE Echo Complete w/o Contrast w/ Doppler (25 @ 09:29) >  TRANSTHORACIC ECHOCARDIOGRAM REPORT        Patient Name:   BANG MARTINEZ Accession #: [id number]  Medical Rec #:  [id number]          Height:      68.0 in 172.7 cm  YOB: 1940         Weight:      230.0 lb 104.33 kg  Patient Age:    84 years          BSA:         2.17 m²  Patient Gender: M                 BP:          148/82 mmHg      Date of Exam:        2025 9:29:40 AM  Referring Physician: SANA FIGUEROA  Sonographer:         Manpreet  Fellow:              Aristeo Feliz  Reading Physician:   Jani Martinez.    Procedure:     2D Echo/Doppler/Color Doppler Complete and Echocardiogram   with                 Definity Contrast.  Indications:   R94.31 - Abnormal Electrocardiogram [ECG] [EKG]  Diagnosis:     R94.31 - Abnormal electrocardiogram ECG/EKG  Study Details: Technically difficult study. Study quality was adversely   affected                 due to patient obesity, body habitus and COPD.        Summary:   1. Technically difficult study.   2. Normal global left ventricular systolic function.   3. Left ventricular ejection fraction, by visual estimation, is 60 to   65%.   4. Spectral Doppler shows pseudonormal pattern of left ventricular   myocardial filling (Grade II diastolic dysfunction). Elevated mean left   atrial pressure.   5. Moderately enlarged right ventricle. Mildly reduced RV systolic   function.   6. Normal left atrial size.   7. Normal right atrial size.   8. Sclerotic aortic valve with normal opening.   9. Mild tricuspid regurgitation.  10. Trivial pericardial effusion.  11. Endocardial visualization was enhanced with intravenous echo contrast.    PHYSICIAN INTERPRETATION:  Left Ventricle: Endocardial visualization wasenhanced with intravenous   echo contrast. The left ventricular internal cavity size is normal. Left   ventricular wall thickness is normal. Global LV systolic function was   normal. Left ventricular ejection fraction, by visual estimation, is 60   to 65%. Spectral Doppler shows pseudonormal pattern of left ventricular   myocardial filling (Grade II diastolic dysfunction). Elevated mean left   atrial pressure.  Right Ventricle: The right ventricular size is moderately enlarged. RV   systolic function is mildly reduced.  Left Atrium: Normal left atrial size.  Right Atrium: Normal right atrial size.  Pericardium: Trivial pericardial effusion is present.  Mitral Valve: Mild thickening of the anterior and posterior mitral valve   leaflets. No evidence of mitral stenosis. Trace mitral valve   regurgitation is seen.  Tricuspid Valve: Structurally normal tricuspid valve, with normal leaflet   excursion. Mild tricuspid regurgitation is visualized.  Aortic Valve: No evidence of aortic stenosis. Sclerotic aortic valve with   normal opening. No evidence of aortic valve regurgitation is seen.  Pulmonic Valve: The pulmonic valve was not well visualized.  Aorta: The aortic root and ascending aorta are structurally normal, with   no evidence of dilitation.  Venous: The inferior vena cava is not well visualized.  In comparison to the previous echocardiogram(s): There are no prior   studies on this patient for comparison purposes.      2D AND M-MODE MEASUREMENTS (normal ranges within parentheses):  Left Ventricle:                  Normal   Aorta/Left Atrium:            Normal  IVSd (2D):              1.00 cm (0.7-1.1) Aortic Root (2D):  3.70 cm   (2.4-3.7)  LVPWd (2D):             1.20 cm (0.7-1.1)  LVIDd (2D):             5.10 cm (3.4-5.7)  LVIDs (2D):             3.10 cm  LV FS (2D):             39.2 %   (>25%)  Relative Wall Thickness  0.47    (<0.42)    SPECTRAL DOPPLER ANALYSIS:  LV DIASTOLIC FUNCTION:  MV Peak E: 0.72 m/s Decel Time: 322 msec  MV Peak A: 1.29 m/s  E/A Ratio: 0.56    Aortic Valve:  AoV VMax:    1.95 m/s  AoV Area, Vmax:    1.34 cm² Vmax Indx:    0.62   cm²/m²  AoV VTI:     0.47 m    AoV Area, VTI:     1.40 cm² VTI Indx:     0.65   cm²/m²  AoV Pk Grad: 15.2 mmHg AoV Area, Mn Grad: 1.27 cm² Mn Grad Indx: 0.58   cm²/m²  AoV Mn Grad: 8.0 mmHg    LVOT Vmax: 0.83 m/s  LVOT VTI:  0.21 m  LVOT Diam: 2.00 cm    Mitral Valve:  MV P1/2 Time: 93.38 msec  MV Area, PHT: 2.36 cm²    Tricuspid Valve and PA/RV Systolic Pressure: TR Max Velocity: 2.69 m/s RA   Pressure:  RVSP/PASP:    Pulmonic Valve:  PV Max Velocity: 1.06 m/s PV Max P.5 mmHg PV Mean P Jani Martinez, Electronically signed on 2025 at 1:22:11 PM      Guy Feliz        *** Final ***    < end of copied text >        < from: CT Angio Chest PE Protocol w/ IV Cont (25 @ 05:58) >  INTERPRETATION:  CLINICAL STATEMENT: PE study    TECHNIQUE: Multislice helical sections were obtained from the thoracic   inlet to the lung bases during rapid administration of intravenous   contrast using a CTA protocol. Thin sections were reconstructed through   the pulmonary vasculature. Sagittal and coronal reformatted images were   acquired, as well as MIP reconstructed images.    CONTRAST VOLUME: Omnipaque 350. 70 cc administered. 30 cc discarded.    COMPARISON CT: None.    QUALITY STATEMENT: Patient upper extremities causing streak artifact   inherently degrades image quality and limits this exam.    FINDINGS:    Pulmonary Embolus: Pulmonary embolus present in Anterior segment of the   right upper lobe (series 4 image 87)..    Mediastinum/Lymph Nodes: No lymphadenopathy by size criteria..    Heart/Great Vessels: Right heart is relatively larger than the left. No   pericardial effusion. Normal caliber aorta. Multivessel coronary arterial   calcifications. Mitral annular calcification.    Abdomen: Limited evaluation based on technique. No acute inflammatory   changes. Partially imaged right renal cyst.    Lungs, Pleura, and Airways: Central airways are patent. No mass or   consolidation. No pneumothorax or pleural effusion.    Bones and soft tissues: No aggressive osseous lesion. Osteopenia. Bony   degenerative changes.      IMPRESSION:    Pulmonary embolus present in anterior segment of the right upper lobe   (series 4 image 87).    Right heart is relatively larger than the left. Given the small clot   burden, it is doubtful that this reflects acute right heart strain and   may reflect chronic changes. Echo for evaluation of right heart strain is   suggested.    --- End of Report ---            < end of copied text >            Patient is a 84y old  Male who presents with a chief complaint of chest pain.    REASON FOR CONSULT     HPI:  84-year-old-year-old male history of diabetes, hypertension, hyperlipidemia presenting to ER for evaluation of 1 day of chest pain.  States since 11 PM last night has had midsternal chest pain described as a constant dull ache with associated shortness of breath.  Symptoms were worse with inspiration. Chest pain lasted around 45 mins and gradually subsided after arrival to ED. He has no associated fever, chills, cough, URI symptoms, leg pain or swelling, abdominal pain, nausea, vomiting or diarrhea. To note patient has sedentary lifestyle and limited mobility secondary to shortness of breath on exertion and peripheral neuropathy.    On presentation vitals:  · BP Systolic	 174 mm Hg  · BP Diastolic	89 mm Hg  · Heart Rate	76 /min  · Respiration Rate (breaths/min)	20 /min  · Temp (F)	97.6 Degrees F  · Temp (C)	36.4 Degrees C  · Temp site	oral  · SpO2 (%)	98 %  · O2 Delivery/Oxygen Delivery Method	room air    Labs are significant for Trop 39> 48, Cr 1.6.  EKG normal sinus rhythm, normal axis, Q waves and T wave inversions inferiorly, no ST depression or elevation.    CTA:  Pulmonary embolus present in anterior segment of the right upper lobe (series 4 image 87).  Right heart is relatively larger than the left. Given the small clot burden, it is doubtful that this reflects acute right heart strain and may reflect chronic changes. Echo for evaluation of right heart strain is suggested.    Patient admitted to medicine for PE and suspected NSTEMI.     (2025 17:16)      PAST MEDICAL & SURGICAL HISTORY:  GAURAV on CPAP  DM (diabetes mellitus)  Cataract  HTN (hypertension)  Seizure  x1  2017  Gout  H/O heart artery stent  x1  H/O carpal tunnel repair  right hand  History of tonsillectomy    SOCIAL HISTORY:     FAMILY HISTORY:    No Known Allergies      MEDICATIONS  (STANDING):  allopurinol 300 milliGRAM(s) Oral daily  amLODIPine   Tablet 2.5 milliGRAM(s) Oral daily  aspirin  chewable 81 milliGRAM(s) Oral daily  atorvastatin 40 milliGRAM(s) Oral at bedtime  dextrose 5%. 1000 milliLiter(s) (100 mL/Hr) IV Continuous <Continuous>  dextrose 5%. 1000 milliLiter(s) (50 mL/Hr) IV Continuous <Continuous>  dextrose 50% Injectable 25 Gram(s) IV Push once  dextrose 50% Injectable 12.5 Gram(s) IV Push once  dextrose 50% Injectable 25 Gram(s) IV Push once  furosemide    Tablet 20 milliGRAM(s) Oral daily  glucagon  Injectable 1 milliGRAM(s) IntraMuscular once  heparin  Infusion.  Unit(s)/Hr (19 mL/Hr) IV Continuous <Continuous>  insulin glargine Injectable (LANTUS) 8 Unit(s) SubCutaneous at bedtime  insulin lispro (ADMELOG) corrective regimen sliding scale   SubCutaneous three times a day before meals  insulin lispro Injectable (ADMELOG) 2 Unit(s) SubCutaneous three times a day before meals  metoprolol succinate ER 25 milliGRAM(s) Oral daily  OXcarbazepine 300 milliGRAM(s) Oral two times a day  senna 2 Tablet(s) Oral at bedtime    MEDICATIONS  (PRN):  dextrose Oral Gel 15 Gram(s) Oral once PRN Blood Glucose LESS THAN 70 milliGRAM(s)/deciliter      Vital Signs Last 24 Hrs  T(C): 36.5 (2025 04:38), Max: 36.8 (2025 21:03)  T(F): 97.7 (2025 04:38), Max: 98.3 (2025 21:03)  HR: 56 (2025 04:38) (56 - 64)  BP: 129/77 (2025 04:38) (115/71 - 129/77)  BP(mean): --  RR: 18 (2025 04:38) (18 - 18)  SpO2: 96% (2025 04:38) (96% - 97%)    Parameters below as of 2025 04:38  Patient On (Oxygen Delivery Method): room air     I&O's Detail    2025 07:01  -  2025 07:00  --------------------------------------------------------  IN:    Heparin Infusion: 34 mL    Oral Fluid: 118 mL  Total IN: 152 mL    OUT:    Voided (mL): 625 mL  Total OUT: 625 mL    Total NET: -473 mL      LABS:                        14.8   6.92  )-----------( 90       ( 2025 04:46 )             45.1     02-24    140  |  102  |  25[H]  ----------------------------<  150[H]  4.0   |  26  |  1.5    Ca    9.2      2025 05:35  Mg     2.0               PTT - ( 2025 04:46 )  PTT:94.2 sec  I&O's Summary    2025 07:01  -  2025 07:00  --------------------------------------------------------  IN: 152 mL / OUT: 625 mL / NET: -473 mL         This is a late entry.  Patient was seen and examined by me in Echo Lab in the morning of 2025.  EMR reviewed.    Mr. Bang Martinez is an 84-year-old  male with a past medical history of CAD S/P PTCA/Stent, HTN, Type II DM, Hyperlipidemia, TAA, Aortic Stenosis, Obstructive Sleep Apnea, Chronic Kidney Disease and OBesity.    He presented at [facility] with complaint of pleuritic chest of 1 day duration.   He denies any exertional chest pain or shortness of breath prior to onset of his pleuritic chest pain.  In Echo lab, he is comfortable in bed.  He denies any chest pain and shortness of breath.    Physical Exam:   Not in distress  Alert, oriented x 3  No JVD; regular rhythm; nl S1S2  Bilateral breath sounds  Abdomen soft  No edema  Moving all extremities    ROS: as stated in HPI  Labs: noted    ECG: Sinus Rhythm.  Inferior wall MI of indeterminate age.      < from: TTE Echo Complete w/o Contrast w/ Doppler (25 @ 09:29) >  TRANSTHORACIC ECHOCARDIOGRAM REPORT        Patient Name:   BANG MARTINEZ Accession #: [id number]  Medical Rec #:  [id number]          Height:      68.0 in 172.7 cm  YOB: 1940         Weight:      230.0 lb 104.33 kg  Patient Age:    84 years          BSA:         2.17 m²  Patient Gender: M                 BP:          148/82 mmHg      Date of Exam:        2025 9:29:40 AM  Referring Physician: SANA FIGUEROA  Sonographer:         Manpreet  Fellow:              Aristeo Feliz  Reading Physician:   Jani Martinez.    Procedure:     2D Echo/Doppler/Color Doppler Complete and Echocardiogram   with                 Definity Contrast.  Indications:   R94.31 - Abnormal Electrocardiogram [ECG] [EKG]  Diagnosis:     R94.31 - Abnormal electrocardiogram ECG/EKG  Study Details: Technically difficult study. Study quality was adversely   affected                 due to patient obesity, body habitus and COPD.        Summary:   1. Technically difficult study.   2. Normal global left ventricular systolic function.   3. Left ventricular ejection fraction, by visual estimation, is 60 to   65%.   4. Spectral Doppler shows pseudonormal pattern of left ventricular   myocardial filling (Grade II diastolic dysfunction). Elevated mean left   atrial pressure.   5. Moderately enlarged right ventricle. Mildly reduced RV systolic   function.   6. Normal left atrial size.   7. Normal right atrial size.   8. Sclerotic aortic valve with normal opening.   9. Mild tricuspid regurgitation.  10. Trivial pericardial effusion.  11. Endocardial visualization was enhanced with intravenous echo contrast.    PHYSICIAN INTERPRETATION:  Left Ventricle: Endocardial visualization wasenhanced with intravenous   echo contrast. The left ventricular internal cavity size is normal. Left   ventricular wall thickness is normal. Global LV systolic function was   normal. Left ventricular ejection fraction, by visual estimation, is 60   to 65%. Spectral Doppler shows pseudonormal pattern of left ventricular   myocardial filling (Grade II diastolic dysfunction). Elevated mean left   atrial pressure.  Right Ventricle: The right ventricular size is moderately enlarged. RV   systolic function is mildly reduced.  Left Atrium: Normal left atrial size.  Right Atrium: Normal right atrial size.  Pericardium: Trivial pericardial effusion is present.  Mitral Valve: Mild thickening of the anterior and posterior mitral valve   leaflets. No evidence of mitral stenosis. Trace mitral valve   regurgitation is seen.  Tricuspid Valve: Structurally normal tricuspid valve, with normal leaflet   excursion. Mild tricuspid regurgitation is visualized.  Aortic Valve: No evidence of aortic stenosis. Sclerotic aortic valve with   normal opening. No evidence of aortic valve regurgitation is seen.  Pulmonic Valve: The pulmonic valve was not well visualized.  Aorta: The aortic root and ascending aorta are structurally normal, with   no evidence of dilitation.  Venous: The inferior vena cava is not well visualized.  In comparison to the previous echocardiogram(s): There are no prior   studies on this patient for comparison purposes.      2D AND M-MODE MEASUREMENTS (normal ranges within parentheses):  Left Ventricle:                  Normal   Aorta/Left Atrium:            Normal  IVSd (2D):              1.00 cm (0.7-1.1) Aortic Root (2D):  3.70 cm   (2.4-3.7)  LVPWd (2D):             1.20 cm (0.7-1.1)  LVIDd (2D):             5.10 cm (3.4-5.7)  LVIDs (2D):             3.10 cm  LV FS (2D):             39.2 %   (>25%)  Relative Wall Thickness  0.47    (<0.42)    SPECTRAL DOPPLER ANALYSIS:  LV DIASTOLIC FUNCTION:  MV Peak E: 0.72 m/s Decel Time: 322 msec  MV Peak A: 1.29 m/s  E/A Ratio: 0.56    Aortic Valve:  AoV VMax:    1.95 m/s  AoV Area, Vmax:    1.34 cm² Vmax Indx:    0.62   cm²/m²  AoV VTI:     0.47 m    AoV Area, VTI:     1.40 cm² VTI Indx:     0.65   cm²/m²  AoV Pk Grad: 15.2 mmHg AoV Area, Mn Grad: 1.27 cm² Mn Grad Indx: 0.58   cm²/m²  AoV Mn Grad: 8.0 mmHg    LVOT Vmax: 0.83 m/s  LVOT VTI:  0.21 m  LVOT Diam: 2.00 cm    Mitral Valve:  MV P1/2 Time: 93.38 msec  MV Area, PHT: 2.36 cm²    Tricuspid Valve and PA/RV Systolic Pressure: TR Max Velocity: 2.69 m/s RA   Pressure:  RVSP/PASP:    Pulmonic Valve:  PV Max Velocity: 1.06 m/s PV Max P.5 mmHg PV Mean P Jani Martinez, Electronically signed on 2025 at 1:22:11 PM      Guy Feliz        *** Final ***    < end of copied text >        < from: CT Angio Chest PE Protocol w/ IV Cont (25 @ 05:58) >  INTERPRETATION:  CLINICAL STATEMENT: PE study    TECHNIQUE: Multislice helical sections were obtained from the thoracic   inlet to the lung bases during rapid administration of intravenous   contrast using a CTA protocol. Thin sections were reconstructed through   the pulmonary vasculature. Sagittal and coronal reformatted images were   acquired, as well as MIP reconstructed images.    CONTRAST VOLUME: Omnipaque 350. 70 cc administered. 30 cc discarded.    COMPARISON CT: None.    QUALITY STATEMENT: Patient upper extremities causing streak artifact   inherently degrades image quality and limits this exam.    FINDINGS:    Pulmonary Embolus: Pulmonary embolus present in Anterior segment of the   right upper lobe (series 4 image 87)..    Mediastinum/Lymph Nodes: No lymphadenopathy by size criteria..    Heart/Great Vessels: Right heart is relatively larger than the left. No   pericardial effusion. Normal caliber aorta. Multivessel coronary arterial   calcifications. Mitral annular calcification.    Abdomen: Limited evaluation based on technique. No acute inflammatory   changes. Partially imaged right renal cyst.    Lungs, Pleura, and Airways: Central airways are patent. No mass or   consolidation. No pneumothorax or pleural effusion.    Bones and soft tissues: No aggressive osseous lesion. Osteopenia. Bony   degenerative changes.      IMPRESSION:    Pulmonary embolus present in anterior segment of the right upper lobe   (series 4 image 87).    Right heart is relatively larger than the left. Given the small clot   burden, it is doubtful that this reflects acute right heart strain and   may reflect chronic changes. Echo for evaluation of right heart strain is   suggested.    --- End of Report ---            < end of copied text >    _____________________________________  House Staff Note    Patient is a 84y old  Male who presents with a chief complaint of chest pain.    REASON FOR CONSULT     HPI:  84-year-old-year-old male history of diabetes, hypertension, hyperlipidemia presenting to ER for evaluation of 1 day of chest pain.  States since 11 PM last night has had midsternal chest pain described as a constant dull ache with associated shortness of breath.  Symptoms were worse with inspiration. Chest pain lasted around 45 mins and gradually subsided after arrival to ED. He has no associated fever, chills, cough, URI symptoms, leg pain or swelling, abdominal pain, nausea, vomiting or diarrhea. To note patient has sedentary lifestyle and limited mobility secondary to shortness of breath on exertion and peripheral neuropathy.    On presentation vitals:  · BP Systolic	 174 mm Hg  · BP Diastolic	89 mm Hg  · Heart Rate	76 /min  · Respiration Rate (breaths/min)	20 /min  · Temp (F)	97.6 Degrees F  · Temp (C)	36.4 Degrees C  · Temp site	oral  · SpO2 (%)	98 %  · O2 Delivery/Oxygen Delivery Method	room air    Labs are significant for Trop 39> 48, Cr 1.6.  EKG normal sinus rhythm, normal axis, Q waves and T wave inversions inferiorly, no ST depression or elevation.    CTA:  Pulmonary embolus present in anterior segment of the right upper lobe (series 4 image 87).  Right heart is relatively larger than the left. Given the small clot burden, it is doubtful that this reflects acute right heart strain and may reflect chronic changes. Echo for evaluation of right heart strain is suggested.    Patient admitted to medicine for PE and suspected NSTEMI.     (2025 17:16)      PAST MEDICAL & SURGICAL HISTORY:  GAURAV on CPAP  DM (diabetes mellitus)  Cataract  HTN (hypertension)  Seizure  x1  2017  Gout  H/O heart artery stent  x1  H/O carpal tunnel repair  right hand  History of tonsillectomy    SOCIAL HISTORY:     FAMILY HISTORY:    No Known Allergies      MEDICATIONS  (STANDING):  allopurinol 300 milliGRAM(s) Oral daily  amLODIPine   Tablet 2.5 milliGRAM(s) Oral daily  aspirin  chewable 81 milliGRAM(s) Oral daily  atorvastatin 40 milliGRAM(s) Oral at bedtime  dextrose 5%. 1000 milliLiter(s) (100 mL/Hr) IV Continuous <Continuous>  dextrose 5%. 1000 milliLiter(s) (50 mL/Hr) IV Continuous <Continuous>  dextrose 50% Injectable 25 Gram(s) IV Push once  dextrose 50% Injectable 12.5 Gram(s) IV Push once  dextrose 50% Injectable 25 Gram(s) IV Push once  furosemide    Tablet 20 milliGRAM(s) Oral daily  glucagon  Injectable 1 milliGRAM(s) IntraMuscular once  heparin  Infusion.  Unit(s)/Hr (19 mL/Hr) IV Continuous <Continuous>  insulin glargine Injectable (LANTUS) 8 Unit(s) SubCutaneous at bedtime  insulin lispro (ADMELOG) corrective regimen sliding scale   SubCutaneous three times a day before meals  insulin lispro Injectable (ADMELOG) 2 Unit(s) SubCutaneous three times a day before meals  metoprolol succinate ER 25 milliGRAM(s) Oral daily  OXcarbazepine 300 milliGRAM(s) Oral two times a day  senna 2 Tablet(s) Oral at bedtime    MEDICATIONS  (PRN):  dextrose Oral Gel 15 Gram(s) Oral once PRN Blood Glucose LESS THAN 70 milliGRAM(s)/deciliter      Vital Signs Last 24 Hrs  T(C): 36.5 (2025 04:38), Max: 36.8 (2025 21:03)  T(F): 97.7 (2025 04:38), Max: 98.3 (2025 21:03)  HR: 56 (2025 04:38) (56 - 64)  BP: 129/77 (2025 04:38) (115/71 - 129/77)  BP(mean): --  RR: 18 (2025 04:38) (18 - 18)  SpO2: 96% (2025 04:38) (96% - 97%)    Parameters below as of 2025 04:38  Patient On (Oxygen Delivery Method): room air     I&O's Detail    2025 07:01  -  2025 07:00  --------------------------------------------------------  IN:    Heparin Infusion: 34 mL    Oral Fluid: 118 mL  Total IN: 152 mL    OUT:    Voided (mL): 625 mL  Total OUT: 625 mL    Total NET: -473 mL      LABS:                        14.8   6.92  )-----------( 90       ( 2025 04:46 )             45.1     02    140  |  102  |  25[H]  ----------------------------<  150[H]  4.0   |  26  |  1.5    Ca    9.2      2025 05:35  Mg     2.0               PTT - ( 2025 04:46 )  PTT:94.2 sec  I&O's Summary    2025 07:01  -  2025 07:00  --------------------------------------------------------  IN: 152 mL / OUT: 625 mL / NET: -473 mL

## 2025-02-25 NOTE — PHYSICAL THERAPY INITIAL EVALUATION ADULT - PERTINENT HX OF CURRENT PROBLEM, REHAB EVAL
84-year-old-year-old male history of diabetes, hypertension, hyperlipidemia presenting to ER for evaluation of 1 day of chest pain.  States since 11 PM last night has had midsternal chest pain described as a constant dull ache with associated shortness of breath.  Symptoms were worse with inspiration. Chest pain lasted around 45 mins and gradually subsided after arrival to ED. He has no associated fever, chills, cough, URI symptoms, leg pain or swelling, abdominal pain, nausea, vomiting or diarrhea. To note patient has sedentary lifestyle and limited mobility secondary to shortness of breath on exertion and peripheral neuropathy.

## 2025-02-25 NOTE — CONSULT NOTE ADULT - ASSESSMENT
1]  Atypical Chest Pain - Acute Pulmonary Embolism  - Findings on CTA Chest noted  - Echo shows dilated RV with hypokinesis.  LVEF normal  - IV Heparin for now.  No OAC.  Keep PTT between 60-80    2] CAD S/P PTCA/Stent      Troponinemia with abnormal ECG - etiology Type I MI vs Type 2 MI  - ECG in office showed small Q waves; present ECG shows deep pathologic Q wave suggestive of IWMI of indeterminate age  - Will recommend cardiac catheterization.  Patient will be added to cath schedule today.  - Keep NPO except po medications  - ECASA 81 mg tablet daily    3] HTN  - continue to monitor    4] Type II DM  - Glycemic control    5] Hyperlipidemia  - On statin therapy    6] Chronic Kidney Disease  - Continue to monitor creatinine    Plan discussed with Medical Attending Dr. Jessenia Lepe MD(covering for Dr. Alec Reed)  On TEAMS  [phone] Office

## 2025-02-25 NOTE — PHYSICAL THERAPY INITIAL EVALUATION ADULT - GAIT TRAINING, PT EVAL
Goal: By discharge: Ambulation: 200 feet without AD, supervision Stairs: 12 with 1 HR with Close supervision

## 2025-02-25 NOTE — PROGRESS NOTE ADULT - SUBJECTIVE AND OBJECTIVE BOX
RAHAT MARTINEZ  84y Male    CHIEF COMPLAINT:    Patient is a 84y old  Male who presents with a chief complaint of Chest Pain (25 Feb 2025 09:14)      INTERVAL HPI/OVERNIGHT EVENTS:    Patient seen and examined.    ROS: All other systems are negative.    Vital Signs:    T(F): 97.7 (02-25-25 @ 04:38), Max: 98.3 (02-24-25 @ 21:03)  HR: 56 (02-25-25 @ 04:38) (56 - 64)  BP: 129/77 (02-25-25 @ 04:38) (115/71 - 129/77)  RR: 18 (02-25-25 @ 04:38) (18 - 18)  SpO2: 96% (02-25-25 @ 04:38) (96% - 97%)  I&O's Summary    24 Feb 2025 07:01  -  25 Feb 2025 07:00  --------------------------------------------------------  IN: 152 mL / OUT: 625 mL / NET: -473 mL      Daily Height in cm: 175.26 (24 Feb 2025 17:03)    Daily   CAPILLARY BLOOD GLUCOSE      POCT Blood Glucose.: 181 mg/dL (25 Feb 2025 11:02)  POCT Blood Glucose.: 153 mg/dL (25 Feb 2025 07:22)  POCT Blood Glucose.: 203 mg/dL (24 Feb 2025 21:39)  POCT Blood Glucose.: 159 mg/dL (24 Feb 2025 17:39)  POCT Blood Glucose.: 175 mg/dL (24 Feb 2025 11:33)      PHYSICAL EXAM:    GENERAL:  NAD  SKIN: No rashes or lesions  HENT: Atraumatic. Normocephalic. PERRL. Moist membranes.  NECK: Supple, No JVD. No lymphadenopathy.  PULMONARY: CTA B/L. No wheezing. No rales  CVS: Normal S1, S2. Rate and Rhythm are regular. No murmurs.  ABDOMEN/GI: Soft, Nontender, Nondistended; BS present  EXTREMITIES: Peripheral pulses intact. No edema B/L LE.  NEUROLOGIC:  No motor or sensory deficit.  PSYCH: Alert & oriented x 3    Consultant(s) Notes Reviewed:  [x ] YES  [ ] NO  Care Discussed with Consultants/Other Providers [ x] YES  [ ] NO    EKG reviewed  Telemetry reviewed    LABS:                        14.8   6.92  )-----------( 90       ( 25 Feb 2025 04:46 )             45.1     02-24    140  |  102  |  25[H]  ----------------------------<  150[H]  4.0   |  26  |  1.5    Ca    9.2      24 Feb 2025 05:35  Mg     2.0     02-24      PTT - ( 25 Feb 2025 04:46 )  PTT:94.2 sec          RADIOLOGY & ADDITIONAL TESTS:    < from: CT Angio Chest PE Protocol w/ IV Cont (02.23.25 @ 05:58) >    IMPRESSION:    Pulmonary embolus present in anterior segment of the right upper lobe   (series 4 image 87).    Right heart is relatively larger than the left. Given the small clot   burden, it is doubtful that this reflects acute right heart strain and   may reflect chronic changes. Echo for evaluation of right heart strain is   suggested.    < end of copied text >  < from: [facility] Duplex Lower Ext Vein Scan, Bilat (02.24.25 @ 18:59) >    IMPRESSION:  No evidence of deep venous thrombosis in either lower extremity.      < end of copied text >  < from: TTE Echo Complete w/o Contrast w/ Doppler (02.24.25 @ 09:29) >    Summary:   1. Technically difficult study.   2. Normal global left ventricular systolic function.   3. Left ventricular ejection fraction, by visual estimation, is 60 to   65%.   4. Spectral Doppler shows pseudonormal pattern of left ventricular   myocardial filling (Grade II diastolic dysfunction). Elevated mean left   atrial pressure.   5. Moderately enlarged right ventricle. Mildly reduced RV systolic   function.   6. Normal left atrial size.   7. Normal right atrial size.   8. Sclerotic aortic valve with normal opening.   9. Mild tricuspid regurgitation.  10. Trivial pericardial effusion.  11. Endocardial visualization was enhanced with intravenous echo contrast.    < end of copied text >    Imaging or report Personally Reviewed:  [x ] YES  [ ] NO    Medications:  Standing  allopurinol 300 milliGRAM(s) Oral daily  amLODIPine   Tablet 2.5 milliGRAM(s) Oral daily  aspirin  chewable 81 milliGRAM(s) Oral daily  atorvastatin 40 milliGRAM(s) Oral at bedtime  dextrose 5%. 1000 milliLiter(s) IV Continuous <Continuous>  dextrose 5%. 1000 milliLiter(s) IV Continuous <Continuous>  dextrose 50% Injectable 25 Gram(s) IV Push once  dextrose 50% Injectable 12.5 Gram(s) IV Push once  dextrose 50% Injectable 25 Gram(s) IV Push once  furosemide    Tablet 20 milliGRAM(s) Oral daily  glucagon  Injectable 1 milliGRAM(s) IntraMuscular once  heparin  Infusion.  Unit(s)/Hr IV Continuous <Continuous>  insulin glargine Injectable (LANTUS) 8 Unit(s) SubCutaneous at bedtime  insulin lispro (ADMELOG) corrective regimen sliding scale   SubCutaneous three times a day before meals  insulin lispro Injectable (ADMELOG) 2 Unit(s) SubCutaneous three times a day before meals  metoprolol succinate ER 25 milliGRAM(s) Oral daily  OXcarbazepine 300 milliGRAM(s) Oral two times a day  senna 2 Tablet(s) Oral at bedtime    PRN Meds  dextrose Oral Gel 15 Gram(s) Oral once PRN      Case discussed with resident    Care discussed with pt/family           RAHAT MARTINEZ  84y Male    CHIEF COMPLAINT:    Patient is a 84y old  Male who presents with a chief complaint of Chest Pain (25 Feb 2025 09:14)      INTERVAL HPI/OVERNIGHT EVENTS:    Patient seen and examined. Sitting in a chair. Denies anymore cp. No sob.    ROS: All other systems are negative.    Vital Signs:    T(F): 97.7 (02-25-25 @ 04:38), Max: 98.3 (02-24-25 @ 21:03)  HR: 56 (02-25-25 @ 04:38) (56 - 64)  BP: 129/77 (02-25-25 @ 04:38) (115/71 - 129/77)  RR: 18 (02-25-25 @ 04:38) (18 - 18)  SpO2: 96% (02-25-25 @ 04:38) (96% - 97%)  I&O's Summary    24 Feb 2025 07:01  -  25 Feb 2025 07:00  --------------------------------------------------------  IN: 152 mL / OUT: 625 mL / NET: -473 mL      Daily Height in cm: 175.26 (24 Feb 2025 17:03)    Daily   CAPILLARY BLOOD GLUCOSE      POCT Blood Glucose.: 181 mg/dL (25 Feb 2025 11:02)  POCT Blood Glucose.: 153 mg/dL (25 Feb 2025 07:22)  POCT Blood Glucose.: 203 mg/dL (24 Feb 2025 21:39)  POCT Blood Glucose.: 159 mg/dL (24 Feb 2025 17:39)  POCT Blood Glucose.: 175 mg/dL (24 Feb 2025 11:33)      PHYSICAL EXAM:    GENERAL:  NAD  SKIN: No rashes or lesions  HENT: Atraumatic. Normocephalic. PERRL. Moist membranes.  NECK: Supple, No JVD. No lymphadenopathy.  PULMONARY: CTA B/L. No wheezing. No rales  CVS: Normal S1, S2. Rate and Rhythm are regular. No murmurs.  ABDOMEN/GI: Soft, Nontender, Nondistended; BS present  EXTREMITIES: Peripheral pulses intact. No edema B/L LE.  NEUROLOGIC:  No motor or sensory deficit.  PSYCH: Alert & oriented x 3    Consultant(s) Notes Reviewed:  [x ] YES  [ ] NO  Care Discussed with Consultants/Other Providers [ x] YES  [ ] NO    EKG reviewed  Telemetry reviewed    LABS:                        14.8   6.92  )-----------( 90       ( 25 Feb 2025 04:46 )             45.1     02-24    140  |  102  |  25[H]  ----------------------------<  150[H]  4.0   |  26  |  1.5    Ca    9.2      24 Feb 2025 05:35  Mg     2.0     02-24      PTT - ( 25 Feb 2025 04:46 )  PTT:94.2 sec          RADIOLOGY & ADDITIONAL TESTS:    < from: CT Angio Chest PE Protocol w/ IV Cont (02.23.25 @ 05:58) >    IMPRESSION:    Pulmonary embolus present in anterior segment of the right upper lobe   (series 4 image 87).    Right heart is relatively larger than the left. Given the small clot   burden, it is doubtful that this reflects acute right heart strain and   may reflect chronic changes. Echo for evaluation of right heart strain is   suggested.    < end of copied text >  < from: [facility] Duplex Lower Ext Vein Scan, Bilat (02.24.25 @ 18:59) >    IMPRESSION:  No evidence of deep venous thrombosis in either lower extremity.      < end of copied text >  < from: TTE Echo Complete w/o Contrast w/ Doppler (02.24.25 @ 09:29) >    Summary:   1. Technically difficult study.   2. Normal global left ventricular systolic function.   3. Left ventricular ejection fraction, by visual estimation, is 60 to   65%.   4. Spectral Doppler shows pseudonormal pattern of left ventricular   myocardial filling (Grade II diastolic dysfunction). Elevated mean left   atrial pressure.   5. Moderately enlarged right ventricle. Mildly reduced RV systolic   function.   6. Normal left atrial size.   7. Normal right atrial size.   8. Sclerotic aortic valve with normal opening.   9. Mild tricuspid regurgitation.  10. Trivial pericardial effusion.  11. Endocardial visualization was enhanced with intravenous echo contrast.    < end of copied text >    Imaging or report Personally Reviewed:  [x ] YES  [ ] NO    Medications:  Standing  allopurinol 300 milliGRAM(s) Oral daily  amLODIPine   Tablet 2.5 milliGRAM(s) Oral daily  aspirin  chewable 81 milliGRAM(s) Oral daily  atorvastatin 40 milliGRAM(s) Oral at bedtime  dextrose 5%. 1000 milliLiter(s) IV Continuous <Continuous>  dextrose 5%. 1000 milliLiter(s) IV Continuous <Continuous>  dextrose 50% Injectable 25 Gram(s) IV Push once  dextrose 50% Injectable 12.5 Gram(s) IV Push once  dextrose 50% Injectable 25 Gram(s) IV Push once  furosemide    Tablet 20 milliGRAM(s) Oral daily  glucagon  Injectable 1 milliGRAM(s) IntraMuscular once  heparin  Infusion.  Unit(s)/Hr IV Continuous <Continuous>  insulin glargine Injectable (LANTUS) 8 Unit(s) SubCutaneous at bedtime  insulin lispro (ADMELOG) corrective regimen sliding scale   SubCutaneous three times a day before meals  insulin lispro Injectable (ADMELOG) 2 Unit(s) SubCutaneous three times a day before meals  metoprolol succinate ER 25 milliGRAM(s) Oral daily  OXcarbazepine 300 milliGRAM(s) Oral two times a day  senna 2 Tablet(s) Oral at bedtime    PRN Meds  dextrose Oral Gel 15 Gram(s) Oral once PRN      Case discussed with resident    Care discussed with pt/family

## 2025-02-25 NOTE — PROGRESS NOTE ADULT - ASSESSMENT
1]  Atypical Chest Pain - Acute Pulmonary Embolism  - Findings on CTA Chest noted  - Echo shows dilated RV with hypokinesis.  LVEF normal  - IV Heparin for now.  No OAC.  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  RCA has 100% occlusion  with thrombus; this is the infarct-related artery.  No PCI of RCA done since it is a non-dominant vessel.    - Will need staged PCI of severe lesion of PLV of LCx.    3] HTN  - continue to monitor    4] Type II DM  - Glycemic control    5] Hyperlipidemia  - On statin therapy.  LDL goal 55 mg/dL or less    6] Chronic Kidney Disease  - Continue to monitor creatinine    Plan discussed with Medical Attending Dr. Jessenia Lepe MD(covering for Dr. Alec Reed)  On TEAMS  [phone] Office

## 2025-02-25 NOTE — CHART NOTE - NSCHARTNOTEFT_GEN_A_CORE
INTERVENTIONAL CARDIOLOGY:                                               PREOPERATIVE DAY OF PROCEDURE EVALUATION:  I have personally seen and examined the patient.  I agree with the history and physical which I have reviewed and noted any changes below.         84-year-old-year-old male history of diabetes, hypertension, hyperlipidemia presenting to ER for evaluation of 1 day of chest pain.  States since 11 PM last night has had midsternal chest pain described as a constant dull ache with associated shortness of breath.  Symptoms were worse with inspiration. Chest pain lasted around 45 mins and gradually subsided after arrival to ED. He has no associated fever, chills, cough, URI symptoms, leg pain or swelling, abdominal pain, nausea, vomiting or diarrhea. To note patient has sedentary lifestyle and limited mobility secondary to shortness of breath on exertion and peripheral neuropathy.  CTA:  Pulmonary embolus present in anterior segment of the right upper lobe (series 4 image 87).  Right heart is relatively larger than the left. Given the small clot burden, it is doubtful that this reflects acute right heart strain and may reflect chronic changes. Echo for evaluation of right heart strain is suggested.    Patient admitted to medicine for PE and suspected NSTEMI and presents to cardiology dpet for LHC with possible intervention                    Pre cath note:  indication:  [ ] STEMI                [ x] NSTEMI                 [ ] Acute coronary syndrome                   [ ]Unstable Angina   [ ] high risk  [ ] intermediate risk  [ ] low risk                   [ ] Stable Angina     non-invasive testing:                          Date:                     result: [ ] high risk  [ ] intermediate risk  [ ] low risk    Anti- Anginal medications:                    [ ] not used d/t                     [ ] used   ( ) BB     ( ) CCB      ( ) Nitrate   (  ) Ranexa          [ ] not used but strong indication not to use    Ejection Fraction                   [ ] <29            [ ] 30-39%   [ ] 40-49%     [ x]>50%    CHF          [ ] active (within last 14 days on meds   [ ] Chronic (on meds but no exacerbation)  NYHA Functional Class:  (  ) Class I (no limitations)  (  ) Class II (slight limitation)  (  ) Class III (marked limitation)  (  ) Class IV (symptoms at rest)    COPD                   [ ] mild (on chronic bronchodilators)  [ ] moderate (on chronic steroid therapy)      [ ] severe (indication for home O2 or PACO2 >50)    Other risk factors:                     [ ] Previous MI                     [ ] CVA/ stroke                    [ ] carotid stent/ CEA                    [ ] PVD/PAD- (arterial aneurysm, non-palpable pulses, tortuous vessel with inability to insert catheter, infra-renal dissection, renal or subclavian artery stenosis)                    [ ] previous CABG                    [ ] Renal Failure:  on HD  (  ) yes  (  ) no                    [x ] Diabetic  (  ) Type 1  (x  ) Type 2                                         (  ) Insulin dependent  (  ) non-insulin dependent                                         (  ) Metformin  (  ) Januvia  ( x ) Glimepiride  (  ) Glipizide  (  ) Glyburide  (  ) Actos                                         (  ) GLP-1 receptor agonists (Ozempic, Wegovy, Zepbound, Mounjaro, Trulicity, Byetta, Victoza)                                         (  ) SGLT2 Inhibitors (Farxiga, Jardiance, Invokana)                                         (  ) Other                            15.5   7.86  )-----------( 109      ( 25 Feb 2025 13:26 )             47.0     02-24    140  |  102  |  25[H]  ----------------------------<  150[H]  4.0   |  26  |  1.5    Ca    9.2      24 Feb 2025 05:35  Mg     2.0     02-24        Bleeding Risk: 1%    Pre-cath Hydration: ( x )  cc IV bolus x 1 over 1 hr followed by:    (  x) NS @ 75cc/hr until procedure (up to 2 hrs) if EF> 50%                                                                                                                             (  ) NS @ 50cc/hr until procedure (up to 2 hrs) if EF< 50%                                        (  ) No precath hydration d/t      RIGHT RADIAL ARTERY EVALUATION:  SUNNI TEST: [] Negative          [x] Positive    EF:   Date:< from: TTE Echo Complete w/o Contrast w/ Doppler (02.24.25 @ 09:29) >      Summary:   1. Technically difficult study.   2. Normal global left ventricular systolic function.   3. Left ventricular ejection fraction, by visual estimation, is 60 to   65%.   4. Spectral Doppler shows pseudonormal pattern of left ventricular   myocardial filling (Grade II diastolic dysfunction). Elevated mean left   atrial pressure.   5. Moderately enlarged right ventricle. Mildly reduced RV systolic   function.   6. Normal left atrial size.   7. Normal right atrial size.   8. Sclerotic aortic valve with normal opening.   9. Mild tricuspid regurgitation.  10. Trivial pericardial effusion.  11. Endocardial visualization was enhanced with intravenous echo contrast.    < end of copied text >        EKG:   Date:< from: 12 Lead ECG (02.23.25 @ 04:16) >      Diagnosis Line Sinus rhythm with occasional Premature ventricular complexes  Right bundle branch block  Inferior infarct , age undetermined  Abnormal ECG    < end of copied text >           (Signed electronically by __________)  02-25-25 @ 16:51

## 2025-02-25 NOTE — PROGRESS NOTE ADULT - ASSESSMENT
84-year-old-year-old male history of diabetes, hypertension, hyperlipidemia presenting to ER for evaluation of 1 day of chest pain.  States since 11 PM last night has had midsternal chest pain described as a constant dull ache with associated shortness of breath.  Symptoms were worse with inspiration. Chest pain lasted around 45 minutes and gradually subsided after arrival to ED.     Acute PE  NSTEMI  DM-2 / HTN / DL               PLAN:    ·	Tele reviewed by me  ·	EKG on admission: NSR 70/min. RBB. Q waves in leads III and avf (Interpreted by me)  ·	Troponin: 48-->86-->99-->132-->137  ·	ECHO reviewed. EF is 60-65%. Grade II diastolic dysfunction. Moderately enlarged right ventricle.  ·	CTA chest noted. Pulmonary embolus present in anterior segment of the right upper lobe  ·	Venous doppler of LE is negative for DVT  ·	Cont IV Heparin. Follow PTT  ·	Cardiology eval 84-year-old-year-old male history of diabetes, hypertension, hyperlipidemia presenting to ER for evaluation of 1 day of chest pain.  States since 11 PM last night has had midsternal chest pain described as a constant dull ache with associated shortness of breath.  Symptoms were worse with inspiration. Chest pain lasted around 45 minutes and gradually subsided after arrival to ED.     Acute PE  NSTEMI  DM-2 / HTN / DL               PLAN:    ·	Tele reviewed by me. No events.   ·	EKG on admission: NSR 70/min. RBB. Q waves in leads III and avf (Interpreted by me)  ·	Troponin: 48-->86-->99-->132-->137  ·	ECHO reviewed. EF is 60-65%. Grade II diastolic dysfunction. Moderately enlarged right ventricle.  ·	CTA chest noted. Pulmonary embolus present in anterior segment of the right upper lobe  ·	Venous doppler of LE is negative for DVT  ·	Cont IV Heparin. Follow PTT  ·	Cont ASA and Lipitor  ·	Unprovoked clot. Pt will need further w/u as an out pt.    ·	Cardiology eval  ·	Monitor FS. On Lispro and Lantus.     Progress Note Handoff    Pending (specify):  Consults__Cardiology_______, Tests________, Test Results_______, Other_________  Family discussion: With the wife on bedside about diagnosis and plan of care.   Disposition: Home___/SNF___/Other________/Unknown at this time________    Ramon Ruelas MD  Spectra: [id number]

## 2025-02-25 NOTE — PROGRESS NOTE ADULT - SUBJECTIVE AND OBJECTIVE BOX
RAHAT MARTINEZ 84y Male  MRN#: [id number]   Hospital Day: 2d    HPI:  84-year-old-year-old male history of diabetes, hypertension, hyperlipidemia presenting to ER for evaluation of 1 day of chest pain.  States since 11 PM last night has had midsternal chest pain described as a constant dull ache with associated shortness of breath.  Symptoms were worse with inspiration. Chest pain lasted around 45 mins and gradually subsided after arrival to ED. He has no associated fever, chills, cough, URI symptoms, leg pain or swelling, abdominal pain, nausea, vomiting or diarrhea. To note patient has sedentary lifestyle and limited mobility secondary to shortness of breath on exertion and peripheral neuropathy.    On presentation vitals:  · BP Systolic	 174 mm Hg  · BP Diastolic	89 mm Hg  · Heart Rate	76 /min  · Respiration Rate (breaths/min)	20 /min  · Temp (F)	97.6 Degrees F  · Temp (C)	36.4 Degrees C  · Temp site	oral  · SpO2 (%)	98 %  · O2 Delivery/Oxygen Delivery Method	room air    Labs are significant for Trop 39> 48, Cr 1.6.  EKG normal sinus rhythm, normal axis, Q waves and T wave inversions inferiorly, no ST depression or elevation.    CTA:  Pulmonary embolus present in anterior segment of the right upper lobe (series 4 image 87).  Right heart is relatively larger than the left. Given the small clot burden, it is doubtful that this reflects acute right heart strain and may reflect chronic changes. Echo for evaluation of right heart strain is suggested.    Patient admitted to medicine for PE and suspected NSTEMI.     (23 Feb 2025 17:16)      SUBJECTIVE  Patient is a 84y old Male who presents with a chief complaint of Currently admitted to medicine with the primary diagnosis of Pulmonary embolism      INTERVAL HPI AND OVERNIGHT EVENTS:  Patient was examined and seen at bedside. This morning he is resting comfortably in bed and reports no issues or overnight events.    REVIEW OF SYMPTOMS:  CONSTITUTIONAL: No weakness, fevers or chills; No headaches  EYES: No visual changes, eye pain, or discharge  ENT: No vertigo; No ear pain or change in hearing; No sore throat or difficulty swallowing  NECK: No pain or stiffness  RESPIRATORY: No cough, wheezing, or hemoptysis; No shortness of breath  CARDIOVASCULAR: No chest pain or palpitations  GASTROINTESTINAL: No abdominal or epigastric pain; No nausea, vomiting, or hematemesis; No diarrhea or constipation; No melena or hematochezia  GENITOURINARY: No dysuria, frequency or hematuria  MUSCULOSKELETAL: No joint pain, no muscle pain, no weakness  NEUROLOGICAL: No numbness or weakness  SKIN: No itching or rashes    OBJECTIVE  PAST MEDICAL & SURGICAL HISTORY  GAURAV on CPAP    DM (diabetes mellitus)    Cataract    HTN (hypertension)    Seizure  x1  2/2017    Gout    H/O heart artery stent  x1    H/O carpal tunnel repair  right hand    History of tonsillectomy      ALLERGIES:  No Known Allergies    MEDICATIONS:  STANDING MEDICATIONS  allopurinol 300 milliGRAM(s) Oral daily  amLODIPine   Tablet 2.5 milliGRAM(s) Oral daily  aspirin  chewable 81 milliGRAM(s) Oral daily  atorvastatin 40 milliGRAM(s) Oral at bedtime  dextrose 5%. 1000 milliLiter(s) IV Continuous <Continuous>  dextrose 5%. 1000 milliLiter(s) IV Continuous <Continuous>  dextrose 50% Injectable 25 Gram(s) IV Push once  dextrose 50% Injectable 12.5 Gram(s) IV Push once  dextrose 50% Injectable 25 Gram(s) IV Push once  furosemide    Tablet 20 milliGRAM(s) Oral daily  glucagon  Injectable 1 milliGRAM(s) IntraMuscular once  heparin  Infusion.  Unit(s)/Hr IV Continuous <Continuous>  insulin glargine Injectable (LANTUS) 8 Unit(s) SubCutaneous at bedtime  insulin lispro (ADMELOG) corrective regimen sliding scale   SubCutaneous three times a day before meals  insulin lispro Injectable (ADMELOG) 2 Unit(s) SubCutaneous three times a day before meals  metoprolol succinate ER 25 milliGRAM(s) Oral daily  OXcarbazepine 300 milliGRAM(s) Oral two times a day  senna 2 Tablet(s) Oral at bedtime    PRN MEDICATIONS  dextrose Oral Gel 15 Gram(s) Oral once PRN      VITAL SIGNS: Last 24 Hours  T(C): 36.5 (25 Feb 2025 04:38), Max: 36.8 (24 Feb 2025 21:03)  T(F): 97.7 (25 Feb 2025 04:38), Max: 98.3 (24 Feb 2025 21:03)  HR: 56 (25 Feb 2025 04:38) (56 - 64)  BP: 129/77 (25 Feb 2025 04:38) (115/71 - 129/77)  BP(mean): --  RR: 18 (25 Feb 2025 04:38) (18 - 18)  SpO2: 96% (25 Feb 2025 04:38) (96% - 97%)    LABS:                        14.8   6.92  )-----------( 90       ( 25 Feb 2025 04:46 )             45.1     02-24    140  |  102  |  25[H]  ----------------------------<  150[H]  4.0   |  26  |  1.5    Ca    9.2      24 Feb 2025 05:35  Mg     2.0     02-24      PTT - ( 25 Feb 2025 04:46 )  PTT:94.2 sec  Urinalysis Basic - ( 24 Feb 2025 05:35 )    Color: x / Appearance: x / SG: x / pH: x  Gluc: 150 mg/dL / Ketone: x  / Bili: x / Urobili: x   Blood: x / Protein: x / Nitrite: x   Leuk Esterase: x / RBC: x / WBC x   Sq Epi: x / Non Sq Epi: x / Bacteria: x        PHYSICAL EXAM:  CONSTITUTIONAL: No acute distress  HEAD: Atraumatic, normocephalic  EYES: EOM intact, PERRLA, conjunctiva and sclera clear  ENT: Supple, no masses, no thyromegaly, no bruits, no JVD; moist mucous membranes  PULMONARY: Clear to auscultation bilaterally; no wheezes, rales, or rhonchi  CARDIOVASCULAR: Regular rate and rhythm; no murmurs, rubs, or gallops  GASTROINTESTINAL: Soft, non-tender, non-distended; bowel sounds present  MUSCULOSKELETAL: 2+ peripheral pulses; no clubbing, no cyanosis, no edema  NEUROLOGY: non-focal    ASSESSMENT & PLAN      4-year-old-year-old male with a pmhx of DM, HTN, HLD, GAURAV, CAD s/p PCI of distal circumflex (2020), seizure (2017 once) presenting with midsternal chest pain associated with sob that history of diabetes, hypertension, hyperlipidemia presenting to ER for evaluation of acute onset chest pain x~ 1 hour. Symptoms resolved since presentations     2/25/2025  - echo noted  - f/u cardio  - f/u duplex LE  ------------    #CAD s/p stent to Lt circumflex in 2020 now with chest pain concerning for NSTEMI  #Acute PE  - CTA CHEST: Pulmonary embolus present in anterior segment of the right upper lobe  - ECG with TWI inferior leads  - s/p ASA loading, continue on asa 81mg qd and Heparin drip. Ptts per RN protocols  - 2/24 overnight team consulted cardio, who recommended above  - trops peaked, patient without CP/SOB  - pending cardio consult Dr Crouch/Sherley  - Small PE on CTA, suspect " incidental" as pt not tachy/not requiring any supplemental 02  - TTE 2/24 >  1. Technically difficult study.   2. Normal global left ventricular systolic function.   3. Left ventricular ejection fraction, by visual estimation, is 60 to   65%.   4. Spectral Doppler shows pseudonormal pattern of left ventricular   myocardial filling (Grade II diastolic dysfunction). Elevated mean left   atrial pressure.   5. Moderately enlarged right ventricle. Mildly reduced RV systolic   function.   6. Normal left atrial size.   7. Normal right atrial size.   8. Sclerotic aortic valve with normal opening.   9. Mild tricuspid regurgitation.  10. Trivial pericardial effusion.  11. Endocardial visualization was enhanced with intravenous echo contrast.  - f/u LE duplex, c/w AC as above, NOAC on dc    #CKDIIIA  - Cr at baseline  - c/w Furosemide 20mg OD    #DM  - check a1c  - insulin regimen and adjust based on FS   RAHAT MARTINEZ 84y Male  MRN#: [id number]   Hospital Day: 2d    HPI:  84-year-old-year-old male history of diabetes, hypertension, hyperlipidemia presenting to ER for evaluation of 1 day of chest pain.  States since 11 PM last night has had midsternal chest pain described as a constant dull ache with associated shortness of breath.  Symptoms were worse with inspiration. Chest pain lasted around 45 mins and gradually subsided after arrival to ED. He has no associated fever, chills, cough, URI symptoms, leg pain or swelling, abdominal pain, nausea, vomiting or diarrhea. To note patient has sedentary lifestyle and limited mobility secondary to shortness of breath on exertion and peripheral neuropathy.    On presentation vitals:  · BP Systolic	 174 mm Hg  · BP Diastolic	89 mm Hg  · Heart Rate	76 /min  · Respiration Rate (breaths/min)	20 /min  · Temp (F)	97.6 Degrees F  · Temp (C)	36.4 Degrees C  · Temp site	oral  · SpO2 (%)	98 %  · O2 Delivery/Oxygen Delivery Method	room air    Labs are significant for Trop 39> 48, Cr 1.6.  EKG normal sinus rhythm, normal axis, Q waves and T wave inversions inferiorly, no ST depression or elevation.    CTA:  Pulmonary embolus present in anterior segment of the right upper lobe (series 4 image 87).  Right heart is relatively larger than the left. Given the small clot burden, it is doubtful that this reflects acute right heart strain and may reflect chronic changes. Echo for evaluation of right heart strain is suggested.    Patient admitted to medicine for PE and suspected NSTEMI.     (23 Feb 2025 17:16)      SUBJECTIVE  Patient is a 84y old Male who presents with a chief complaint of Currently admitted to medicine with the primary diagnosis of Pulmonary embolism      INTERVAL HPI AND OVERNIGHT EVENTS:  Patient was examined and seen at bedside. No reported events overnight.    OBJECTIVE  PAST MEDICAL & SURGICAL HISTORY  GAURAV on CPAP    DM (diabetes mellitus)    Cataract    HTN (hypertension)    Seizure  x1  2/2017    Gout    H/O heart artery stent  x1    H/O carpal tunnel repair  right hand    History of tonsillectomy      ALLERGIES:  No Known Allergies    MEDICATIONS:  STANDING MEDICATIONS  allopurinol 300 milliGRAM(s) Oral daily  amLODIPine   Tablet 2.5 milliGRAM(s) Oral daily  aspirin  chewable 81 milliGRAM(s) Oral daily  atorvastatin 40 milliGRAM(s) Oral at bedtime  dextrose 5%. 1000 milliLiter(s) IV Continuous <Continuous>  dextrose 5%. 1000 milliLiter(s) IV Continuous <Continuous>  dextrose 50% Injectable 25 Gram(s) IV Push once  dextrose 50% Injectable 12.5 Gram(s) IV Push once  dextrose 50% Injectable 25 Gram(s) IV Push once  furosemide    Tablet 20 milliGRAM(s) Oral daily  glucagon  Injectable 1 milliGRAM(s) IntraMuscular once  heparin  Infusion.  Unit(s)/Hr IV Continuous <Continuous>  insulin glargine Injectable (LANTUS) 8 Unit(s) SubCutaneous at bedtime  insulin lispro (ADMELOG) corrective regimen sliding scale   SubCutaneous three times a day before meals  insulin lispro Injectable (ADMELOG) 2 Unit(s) SubCutaneous three times a day before meals  metoprolol succinate ER 25 milliGRAM(s) Oral daily  OXcarbazepine 300 milliGRAM(s) Oral two times a day  senna 2 Tablet(s) Oral at bedtime    PRN MEDICATIONS  dextrose Oral Gel 15 Gram(s) Oral once PRN    VITAL SIGNS: Last 24 Hours  T(C): 36.5 (25 Feb 2025 04:38), Max: 36.8 (24 Feb 2025 21:03)  T(F): 97.7 (25 Feb 2025 04:38), Max: 98.3 (24 Feb 2025 21:03)  HR: 56 (25 Feb 2025 04:38) (56 - 64)  BP: 129/77 (25 Feb 2025 04:38) (115/71 - 129/77)  BP(mean): --  RR: 18 (25 Feb 2025 04:38) (18 - 18)  SpO2: 96% (25 Feb 2025 04:38) (96% - 97%)    LABS:                        14.8   6.92  )-----------( 90       ( 25 Feb 2025 04:46 )             45.1     02-24    140  |  102  |  25[H]  ----------------------------<  150[H]  4.0   |  26  |  1.5    Ca    9.2      24 Feb 2025 05:35  Mg     2.0     02-24      PTT - ( 25 Feb 2025 04:46 )  PTT:94.2 sec  Urinalysis Basic - ( 24 Feb 2025 05:35 )    Color: x / Appearance: x / SG: x / pH: x  Gluc: 150 mg/dL / Ketone: x  / Bili: x / Urobili: x   Blood: x / Protein: x / Nitrite: x   Leuk Esterase: x / RBC: x / WBC x   Sq Epi: x / Non Sq Epi: x / Bacteria: x      PHYSICAL EXAM:  CONSTITUTIONAL: No acute distress  HEAD: Atraumatic, normocephalic  EYES: EOM intact, PERRLA, conjunctiva and sclera clear  ENT: Supple, no masses, no thyromegaly, no bruits, no JVD; moist mucous membranes  PULMONARY: Clear to auscultation bilaterally; no wheezes, rales, or rhonchi  CARDIOVASCULAR: Regular rate and rhythm; no murmurs, rubs, or gallops  GASTROINTESTINAL: Soft, non-tender, non-distended; bowel sounds present  MUSCULOSKELETAL: 2+ peripheral pulses; no clubbing, no cyanosis, no edema  NEUROLOGY: non-focal    ASSESSMENT & PLAN    84-year-old-year-old male with a pmhx of DM, HTN, HLD, GAURAV, CAD s/p PCI of distal circumflex (2020), seizure (2017 once) presenting with midsternal chest pain associated with sob that history of diabetes, hypertension, hyperlipidemia presenting to ER for evaluation of acute onset chest pain x~ 1 hour. Symptoms resolved since presentations     2/25/2025  - echo noted  - f/u cardio, trops uptrending  - f/u duplex LE > neg  ------------    #CAD s/p stent to Lt circumflex in 2020 now with chest pain concerning for NSTEMI  #Acute PE  - CTA CHEST: Pulmonary embolus present in anterior segment of the right upper lobe  - ECG with TWI inferior leads  - s/p ASA loading, continue on asa 81mg qd and Heparin drip. Ptts per RN protocols  - 2/24 overnight team consulted cardio, who recommended above  - trops peaked, patient without CP/SOB  - pending cardio consult Dr Crouch/Sherley  - Small PE on CTA, suspect " incidental" as pt not tachy/not requiring any supplemental 02  - TTE 2/24 >  1. Technically difficult study.   2. Normal global left ventricular systolic function.   3. Left ventricular ejection fraction, by visual estimation, is 60 to   65%.   4. Spectral Doppler shows pseudonormal pattern of left ventricular   myocardial filling (Grade II diastolic dysfunction). Elevated mean left   atrial pressure.   5. Moderately enlarged right ventricle. Mildly reduced RV systolic   function.   6. Normal left atrial size.   7. Normal right atrial size.   8. Sclerotic aortic valve with normal opening.   9. Mild tricuspid regurgitation.  10. Trivial pericardial effusion.  11. Endocardial visualization was enhanced with intravenous echo contrast.  - c/w AC as above, NOAC on dc  - duplex neg  - f/u cardio, trops uptrending    #CKDIIIA  - Cr at baseline  - c/w Furosemide 20mg OD    #DM  - check a1c  - insulin regimen and adjust based on FS   RAHAT MARTINEZ 84y Male  MRN#: [id number]   Hospital Day: 2d    HPI:  84-year-old-year-old male history of diabetes, hypertension, hyperlipidemia presenting to ER for evaluation of 1 day of chest pain.  States since 11 PM last night has had midsternal chest pain described as a constant dull ache with associated shortness of breath.  Symptoms were worse with inspiration. Chest pain lasted around 45 mins and gradually subsided after arrival to ED. He has no associated fever, chills, cough, URI symptoms, leg pain or swelling, abdominal pain, nausea, vomiting or diarrhea. To note patient has sedentary lifestyle and limited mobility secondary to shortness of breath on exertion and peripheral neuropathy.    On presentation vitals:  · BP Systolic	 174 mm Hg  · BP Diastolic	89 mm Hg  · Heart Rate	76 /min  · Respiration Rate (breaths/min)	20 /min  · Temp (F)	97.6 Degrees F  · Temp (C)	36.4 Degrees C  · Temp site	oral  · SpO2 (%)	98 %  · O2 Delivery/Oxygen Delivery Method	room air    Labs are significant for Trop 39> 48, Cr 1.6.  EKG normal sinus rhythm, normal axis, Q waves and T wave inversions inferiorly, no ST depression or elevation.    CTA:  Pulmonary embolus present in anterior segment of the right upper lobe (series 4 image 87).  Right heart is relatively larger than the left. Given the small clot burden, it is doubtful that this reflects acute right heart strain and may reflect chronic changes. Echo for evaluation of right heart strain is suggested.    Patient admitted to medicine for PE and suspected NSTEMI.     (23 Feb 2025 17:16)      SUBJECTIVE  Patient is a 84y old Male who presents with a chief complaint of Currently admitted to medicine with the primary diagnosis of Pulmonary embolism      INTERVAL HPI AND OVERNIGHT EVENTS:  Patient was examined and seen at bedside. No reported events overnight.    OBJECTIVE  PAST MEDICAL & SURGICAL HISTORY  GAURAV on CPAP    DM (diabetes mellitus)    Cataract    HTN (hypertension)    Seizure  x1  2/2017    Gout    H/O heart artery stent  x1    H/O carpal tunnel repair  right hand    History of tonsillectomy      ALLERGIES:  No Known Allergies    MEDICATIONS:  STANDING MEDICATIONS  allopurinol 300 milliGRAM(s) Oral daily  amLODIPine   Tablet 2.5 milliGRAM(s) Oral daily  aspirin  chewable 81 milliGRAM(s) Oral daily  atorvastatin 40 milliGRAM(s) Oral at bedtime  dextrose 5%. 1000 milliLiter(s) IV Continuous <Continuous>  dextrose 5%. 1000 milliLiter(s) IV Continuous <Continuous>  dextrose 50% Injectable 25 Gram(s) IV Push once  dextrose 50% Injectable 12.5 Gram(s) IV Push once  dextrose 50% Injectable 25 Gram(s) IV Push once  furosemide    Tablet 20 milliGRAM(s) Oral daily  glucagon  Injectable 1 milliGRAM(s) IntraMuscular once  heparin  Infusion.  Unit(s)/Hr IV Continuous <Continuous>  insulin glargine Injectable (LANTUS) 8 Unit(s) SubCutaneous at bedtime  insulin lispro (ADMELOG) corrective regimen sliding scale   SubCutaneous three times a day before meals  insulin lispro Injectable (ADMELOG) 2 Unit(s) SubCutaneous three times a day before meals  metoprolol succinate ER 25 milliGRAM(s) Oral daily  OXcarbazepine 300 milliGRAM(s) Oral two times a day  senna 2 Tablet(s) Oral at bedtime    PRN MEDICATIONS  dextrose Oral Gel 15 Gram(s) Oral once PRN    VITAL SIGNS: Last 24 Hours  T(C): 36.5 (25 Feb 2025 04:38), Max: 36.8 (24 Feb 2025 21:03)  T(F): 97.7 (25 Feb 2025 04:38), Max: 98.3 (24 Feb 2025 21:03)  HR: 56 (25 Feb 2025 04:38) (56 - 64)  BP: 129/77 (25 Feb 2025 04:38) (115/71 - 129/77)  BP(mean): --  RR: 18 (25 Feb 2025 04:38) (18 - 18)  SpO2: 96% (25 Feb 2025 04:38) (96% - 97%)    LABS:                        14.8   6.92  )-----------( 90       ( 25 Feb 2025 04:46 )             45.1     02-24    140  |  102  |  25[H]  ----------------------------<  150[H]  4.0   |  26  |  1.5    Ca    9.2      24 Feb 2025 05:35  Mg     2.0     02-24      PTT - ( 25 Feb 2025 04:46 )  PTT:94.2 sec  Urinalysis Basic - ( 24 Feb 2025 05:35 )    Color: x / Appearance: x / SG: x / pH: x  Gluc: 150 mg/dL / Ketone: x  / Bili: x / Urobili: x   Blood: x / Protein: x / Nitrite: x   Leuk Esterase: x / RBC: x / WBC x   Sq Epi: x / Non Sq Epi: x / Bacteria: x      PHYSICAL EXAM:  CONSTITUTIONAL: No acute distress  HEAD: Atraumatic, normocephalic  EYES: EOM intact, PERRLA, conjunctiva and sclera clear  ENT: Supple, no masses, no thyromegaly, no bruits, no JVD; moist mucous membranes  PULMONARY: Clear to auscultation bilaterally; no wheezes, rales, or rhonchi  CARDIOVASCULAR: Regular rate and rhythm; no murmurs, rubs, or gallops  GASTROINTESTINAL: Soft, non-tender, non-distended; bowel sounds present  MUSCULOSKELETAL: 2+ peripheral pulses; no clubbing, no cyanosis, no edema  NEUROLOGY: non-focal    ASSESSMENT & PLAN    84-year-old-year-old male with a pmhx of DM, HTN, HLD, GAUARV, CAD s/p PCI of distal circumflex (2020), seizure (2017 once) presenting with midsternal chest pain associated with sob that history of diabetes, hypertension, hyperlipidemia presenting to ER for evaluation of acute onset chest pain x~ 1 hour. Symptoms resolved since presentations     2/25/2025  - echo noted  - f/u cardio  - f/u duplex LE > neg  ------------    #CAD s/p stent to Lt circumflex in 2020 now with chest pain concerning for NSTEMI  #Acute PE  - CTA CHEST: Pulmonary embolus present in anterior segment of the right upper lobe  - ECG with TWI inferior leads  - s/p ASA loading, continue on asa 81mg qd and Heparin drip. Ptts per RN protocols  - 2/24 overnight team consulted cardio, who recommended above  - trops peaked, patient without CP/SOB  - pending cardio consult Dr Crouch/Sherley  - Small PE on CTA, suspect " incidental" as pt not tachy/not requiring any supplemental 02  - TTE 2/24 >  1. Technically difficult study.   2. Normal global left ventricular systolic function.   3. Left ventricular ejection fraction, by visual estimation, is 60 to   65%.   4. Spectral Doppler shows pseudonormal pattern of left ventricular   myocardial filling (Grade II diastolic dysfunction). Elevated mean left   atrial pressure.   5. Moderately enlarged right ventricle. Mildly reduced RV systolic   function.   6. Normal left atrial size.   7. Normal right atrial size.   8. Sclerotic aortic valve with normal opening.   9. Mild tricuspid regurgitation.  10. Trivial pericardial effusion.  11. Endocardial visualization was enhanced with intravenous echo contrast.  - c/w AC as above, NOAC on dc  - duplex neg  - f/u cardio, trops uptrending    #CKDIIIA  - Cr at baseline  - c/w Furosemide 20mg OD    #DM  - check a1c  - insulin regimen and adjust based on FS

## 2025-02-26 LAB
APTT BLD: 84.6 SEC — CRITICAL HIGH (ref 27–39.2)
GLUCOSE BLDC GLUCOMTR-MCNC: 152 MG/DL — HIGH (ref 70–99)
GLUCOSE BLDC GLUCOMTR-MCNC: 173 MG/DL — HIGH (ref 70–99)
GLUCOSE BLDC GLUCOMTR-MCNC: 198 MG/DL — HIGH (ref 70–99)
GLUCOSE BLDC GLUCOMTR-MCNC: 202 MG/DL — HIGH (ref 70–99)
HCT VFR BLD CALC: 43.1 % — SIGNIFICANT CHANGE UP (ref 42–52)
HGB BLD-MCNC: 14.3 G/DL — SIGNIFICANT CHANGE UP (ref 14–18)
MCHC RBC-ENTMCNC: 31.3 PG — HIGH (ref 27–31)
MCHC RBC-ENTMCNC: 33.2 G/DL — SIGNIFICANT CHANGE UP (ref 32–37)
MCV RBC AUTO: 94.3 FL — HIGH (ref 80–94)
NRBC BLD AUTO-RTO: 0 /100 WBCS — SIGNIFICANT CHANGE UP (ref 0–0)
PLATELET # BLD AUTO: 84 K/UL — LOW (ref 130–400)
PMV BLD: 12.1 FL — HIGH (ref 7.4–10.4)
RBC # BLD: 4.57 M/UL — LOW (ref 4.7–6.1)
RBC # FLD: 13.4 % — SIGNIFICANT CHANGE UP (ref 11.5–14.5)
WBC # BLD: 6.51 K/UL — SIGNIFICANT CHANGE UP (ref 4.8–10.8)
WBC # FLD AUTO: 6.51 K/UL — SIGNIFICANT CHANGE UP (ref 4.8–10.8)

## 2025-02-26 PROCEDURE — 99233 SBSQ HOSP IP/OBS HIGH 50: CPT

## 2025-02-26 PROCEDURE — 93010 ELECTROCARDIOGRAM REPORT: CPT

## 2025-02-26 RX ORDER — APIXABAN 2.5 MG/1
10 TABLET, FILM COATED ORAL EVERY 12 HOURS
Refills: 0 | Status: DISCONTINUED | OUTPATIENT
Start: 2025-02-26 | End: 2025-02-27

## 2025-02-26 RX ORDER — APIXABAN 2.5 MG/1
10 TABLET, FILM COATED ORAL EVERY 12 HOURS
Refills: 0 | Status: DISCONTINUED | OUTPATIENT
Start: 2025-02-26 | End: 2025-02-26

## 2025-02-26 RX ORDER — ATORVASTATIN CALCIUM 80 MG/1
80 TABLET, FILM COATED ORAL AT BEDTIME
Refills: 0 | Status: DISCONTINUED | OUTPATIENT
Start: 2025-02-26 | End: 2025-02-27

## 2025-02-26 RX ADMIN — INSULIN LISPRO 2 UNIT(S): 100 INJECTION, SOLUTION INTRAVENOUS; SUBCUTANEOUS at 07:59

## 2025-02-26 RX ADMIN — FUROSEMIDE 20 MILLIGRAM(S): 10 INJECTION INTRAMUSCULAR; INTRAVENOUS at 05:40

## 2025-02-26 RX ADMIN — APIXABAN 10 MILLIGRAM(S): 2.5 TABLET, FILM COATED ORAL at 14:22

## 2025-02-26 RX ADMIN — INSULIN LISPRO 1: 100 INJECTION, SOLUTION INTRAVENOUS; SUBCUTANEOUS at 12:24

## 2025-02-26 RX ADMIN — OXCARBAZEPINE 300 MILLIGRAM(S): 150 TABLET, FILM COATED ORAL at 17:29

## 2025-02-26 RX ADMIN — Medication 2 TABLET(S): at 21:48

## 2025-02-26 RX ADMIN — METOPROLOL SUCCINATE 25 MILLIGRAM(S): 50 TABLET, EXTENDED RELEASE ORAL at 05:42

## 2025-02-26 RX ADMIN — HEPARIN SODIUM 1600 UNIT(S)/HR: 1000 INJECTION INTRAVENOUS; SUBCUTANEOUS at 00:27

## 2025-02-26 RX ADMIN — OXCARBAZEPINE 300 MILLIGRAM(S): 150 TABLET, FILM COATED ORAL at 05:39

## 2025-02-26 RX ADMIN — INSULIN LISPRO 2 UNIT(S): 100 INJECTION, SOLUTION INTRAVENOUS; SUBCUTANEOUS at 16:42

## 2025-02-26 RX ADMIN — CLOPIDOGREL BISULFATE 75 MILLIGRAM(S): 75 TABLET, FILM COATED ORAL at 12:23

## 2025-02-26 RX ADMIN — HEPARIN SODIUM 1600 UNIT(S)/HR: 1000 INJECTION INTRAVENOUS; SUBCUTANEOUS at 08:01

## 2025-02-26 RX ADMIN — INSULIN LISPRO 1: 100 INJECTION, SOLUTION INTRAVENOUS; SUBCUTANEOUS at 07:59

## 2025-02-26 RX ADMIN — INSULIN LISPRO 2 UNIT(S): 100 INJECTION, SOLUTION INTRAVENOUS; SUBCUTANEOUS at 12:25

## 2025-02-26 RX ADMIN — INSULIN LISPRO 2: 100 INJECTION, SOLUTION INTRAVENOUS; SUBCUTANEOUS at 16:43

## 2025-02-26 RX ADMIN — AMLODIPINE BESYLATE 2.5 MILLIGRAM(S): 10 TABLET ORAL at 05:41

## 2025-02-26 RX ADMIN — INSULIN GLARGINE-YFGN 8 UNIT(S): 100 INJECTION, SOLUTION SUBCUTANEOUS at 21:48

## 2025-02-26 RX ADMIN — ATORVASTATIN CALCIUM 80 MILLIGRAM(S): 80 TABLET, FILM COATED ORAL at 21:48

## 2025-02-26 RX ADMIN — Medication 300 MILLIGRAM(S): at 12:23

## 2025-02-26 NOTE — PROGRESS NOTE ADULT - SUBJECTIVE AND OBJECTIVE BOX
Cardiology Follow up s/p PCI    MICHELLE RAHAT   84y Male  PAST MEDICAL & SURGICAL HISTORY:    GAURAV on CPAP      DM (diabetes mellitus)      Cataract      HTN (hypertension)      Seizure  x1  2/2017      Gout      H/O heart artery stent  x1      H/O carpal tunnel repair  right hand      History of tonsillectomy           HPI:  84-year-old-year-old male history of diabetes, hypertension, hyperlipidemia presenting to ER for evaluation of 1 day of chest pain.  States since 11 PM last night has had midsternal chest pain described as a constant dull ache with associated shortness of breath.  Symptoms were worse with inspiration. Chest pain lasted around 45 mins and gradually subsided after arrival to ED. He has no associated fever, chills, cough, URI symptoms, leg pain or swelling, abdominal pain, nausea, vomiting or diarrhea. To note patient has sedentary lifestyle and limited mobility secondary to shortness of breath on exertion and peripheral neuropathy.      Allergies    No Known Allergies    Intolerances    Patient seen and examined at bedside. No acute events overnight.  Patient without complaints. Pt ambulated without issues/symptoms  Denies CP, SOB, palpitations, or dizziness  No events on telemetry overnight    Vital Signs Last 24 Hrs  T(C): 36.7 (26 Feb 2025 04:28), Max: 37 (25 Feb 2025 17:07)  T(F): 98 (26 Feb 2025 04:28), Max: 98.6 (25 Feb 2025 17:07)  HR: 76 (26 Feb 2025 09:59) (54 - 76)  BP: 133/77 (26 Feb 2025 04:28) (119/72 - 135/80)  BP(mean): 88 (25 Feb 2025 11:37) (88 - 88)  RR: 18 (26 Feb 2025 04:28) (15 - 18)  SpO2: 96% (26 Feb 2025 04:28) (96% - 98%)    Parameters below as of 26 Feb 2025 04:28  Patient On (Oxygen Delivery Method): room air        MEDICATIONS  (STANDING):  allopurinol 300 milliGRAM(s) Oral daily  amLODIPine   Tablet 2.5 milliGRAM(s) Oral daily  apixaban 10 milliGRAM(s) Oral every 12 hours  atorvastatin 40 milliGRAM(s) Oral at bedtime  clopidogrel Tablet 75 milliGRAM(s) Oral daily  dextrose 5%. 1000 milliLiter(s) (100 mL/Hr) IV Continuous <Continuous>  dextrose 5%. 1000 milliLiter(s) (50 mL/Hr) IV Continuous <Continuous>  dextrose 50% Injectable 25 Gram(s) IV Push once  dextrose 50% Injectable 12.5 Gram(s) IV Push once  dextrose 50% Injectable 25 Gram(s) IV Push once  furosemide    Tablet 20 milliGRAM(s) Oral daily  glucagon  Injectable 1 milliGRAM(s) IntraMuscular once  insulin glargine Injectable (LANTUS) 8 Unit(s) SubCutaneous at bedtime  insulin lispro (ADMELOG) corrective regimen sliding scale   SubCutaneous three times a day before meals  insulin lispro Injectable (ADMELOG) 2 Unit(s) SubCutaneous three times a day before meals  metoprolol succinate ER 25 milliGRAM(s) Oral daily  OXcarbazepine 300 milliGRAM(s) Oral two times a day  pantoprazole    Tablet 40 milliGRAM(s) Oral before breakfast  senna 2 Tablet(s) Oral at bedtime  sodium chloride 0.9%. 1000 milliLiter(s) (75 mL/Hr) IV Continuous <Continuous>  sodium chloride 0.9%. 1000 milliLiter(s) (100 mL/Hr) IV Continuous <Continuous>    MEDICATIONS  (PRN):  dextrose Oral Gel 15 Gram(s) Oral once PRN Blood Glucose LESS THAN 70 milliGRAM(s)/deciliter      REVIEW OF SYSTEMS:          All negative except as mentioned in HPI    PHYSICAL EXAM:           CONSTITUTIONAL: Well-developed; well-nourished; in no acute distress  	SKIN: warm, dry  	HEAD: Normocephalic; atraumatic  	EYES: PERRL.  	ENT: No nasal discharge, airway clear, mucous membranes moist  	NECK: Supple; non tender.  	CARD: +S1, +S2, no murmurs, gallops, or rubs. Regular rate and rhythm    	RESP: No wheezes, rales or rhonchi. CTA B/L  	ABD: soft ntnd, + BS x 4 quadrants  	EXT: moves all extremities,  no clubbing, cyanosis or edema  	NEURO: Alert and oriented x3, no focal deficits          PSYCH: Cooperative, appropriate          VASCULAR:  + Rad / + PTs / +  DPs          EXTREMITY:              Right Groin: dressing removed, access site soft, no hematoma, no pain, + pulses, no sign of infection, no numbness  	             2D ECHO:  < from: TTE Echo Complete w/o Contrast w/ Doppler (02.24.25 @ 09:29) >    Summary:   1. Technically difficult study.   2. Normal global left ventricular systolic function.   3. Left ventricular ejection fraction, by visual estimation, is 60 to   65%.   4. Spectral Doppler shows pseudonormal pattern of left ventricular   myocardial filling (Grade II diastolic dysfunction). Elevated mean left   atrial pressure.   5. Moderately enlarged right ventricle. Mildly reduced RV systolic   function.   6. Normal left atrial size.   7. Normal right atrial size.   8. Sclerotic aortic valve with normal opening.   9. Mild tricuspid regurgitation.  10. Trivial pericardial effusion.  11. Endocardial visualization was enhanced with intravenous echo contrast.      ECG:  < from: 12 Lead ECG (02.23.25 @ 04:16) >    Ventricular Rate 73 BPM    Atrial Rate 73 BPM    P-R Interval 186 ms    QRS Duration 110 ms    Q-T Interval 424 ms    QTC Calculation(Bazett) 467 ms    P Axis 37 degrees    R Axis 24 degrees    T Axis -2 degrees    Diagnosis Line Sinus rhythm with occasional Premature ventricular complexes  Right bundle branch block  Inferior infarct , age undetermined  Abnormal ECG    Confirmed by Mart Hobbs ([id number]) on 2/23/2025 5:45:46 PM                                                                                   LABS:                          14.3   6.51  )-----------( 84       ( 26 Feb 2025 04:38 )             43.1     02-25    140  |  102  |  27[H]  ----------------------------<  131[H]  3.7   |  25  |  1.5    Ca    8.5      25 Feb 2025 19:14    PTT - ( 26 Feb 2025 06:13 )  PTT:84.6 sec      A/P:  I discussed the case with Cardiologist Dr. Lepe and recommend the following:  S/P PCI: MEHDI                                       D/C Heparin gtt                   D/C Aspirin due to risk of bleeding                    Start Eliquis in AM 2/26/2025 due to PE                   No ACEi/ARB due to elevated Cr  	     Continue Plavix 75 mg PO Daily, Lasix, CCB, Eliquis, Statin Therapy, B-Blocker, PPI                   Patient agreeing to take APT/AC as directed by cardiologist                    OOB to chair with assistance, ambulate around the unit                    Keep K = 4, Mg = 2                   Monitor right groin access site, Hg and Cr, Plt                             Pt given instructions on importance of taking antiplatelet medication or risk acute stent thrombosis/death                   Post cath instructions, access site care and activity restrictions reviewed with patient                     Cardiac rehab information provided/referral and communication to Cardiac Rehab completed                      Benefits of Cardiac Rehab discussed with patient                   Patient instructed to call Cardiac Rehab and make first appointment after first f/u visit with Cardiologist                    Discussed with patient to return to hospital if experience chest pain, shortness breath, dizziness and site bleeding                   Aggressive risk factor modification, diet counseling, smoking cessation discussed with patient                       Monitor on TELE                  Discharge instructions as follows, when ready to d/c:    Activity:  - Do not drive or operate heavy machinery for 24 hours.  - Limit your physical or any strenuous activity for 2 weeks after angioplasty and 48 hours for angiogram. Support the groin site with your hand when you sneeze or cough. No heavy lifting ( objects more then 10 pounds).  - For wrist access, avoid using affected arm for 24 hours after removal of dressing and avoid heavy lifting for 7 days.  Hygiene:  - After 24 hours, you may shower and remove the dressing from the site. Do not tub bathe for one week. Do not rub or apply lotion, cream, powder to the affected site. Leave it open to air.   Diet:   - You may resume your diet. Low Sodium. Low Fat, Low Cholesterol.  If Diabetic - Carbohydrate Consistent Diet.      - Drink extra fluid unless otherwise advised.   Special Instructions:  - Bruising or black and blue at the puncture site is possible.  - If there is bleeding from the puncture site (groin or wrist) apply direct firm pressure on the site and call 911.  - Any sudden swelling, redness, fever, discharge or severe pain, call your physician or call the cath lab.   - If you notice any scab formation in the area avoid touching the site and allow it to heal.  - Numbness or "pins and needle" sensation in the affected arm, hand, leg or if the affected site become cool to touch or pale that persist for extended period of time call your physician immediately to be checked.  - If you developed chest pain, not relieved by your usual routine medication, fainting, lethargy, weakness, report to the nearest emergency room.   - Inform your Dentist or Surgeon if you are taking Aspirin or any antiplatelet medications. Report any bleeding in your urine or stool.   Medications:  - Soreness or tenderness at the site is possible it will diminish over time. You may take Tylenol every 4-6 hours as needed. Nothing stronger is needed.  - If you are diabetic and taking medication containing Metformin, do not take them for 48 hours after the procedure.     Any questions call Cardiac Cath Lab at [phone] or [phone], Monday - Friday from 7 - 9 pm.

## 2025-02-26 NOTE — PROGRESS NOTE ADULT - ASSESSMENT
84-year-old-year-old male history of diabetes, hypertension, hyperlipidemia presenting to ER for evaluation of 1 day of chest pain.  States since 11 PM last night has had midsternal chest pain described as a constant dull ache with associated shortness of breath.  Symptoms were worse with inspiration. Chest pain lasted around 45 minutes and gradually subsided after arrival to ED.     Acute PE  NSTEMI  DM-2 / HTN / DL               PLAN:    ·	Tele reviewed by me. No events.   ·	EKG on admission: NSR 70/min. RBB. Q waves in leads III and avf (Interpreted by me)  ·	Troponin: 48-->86-->99-->132-->137  ·	S/P cath o 2/25. Called the cardiologist and discussed care with him. Instent thrombosis of LAD s/p MEHDI. Tight lesion LCX. Plan for staged PCI in one month. RCA occulusion  ·	Cardiology recommended to d/c ASA and cont Eliquis and Plavix  ·	D/C IV Heparin. Eliquis 10 mg po q 12h for one week, then 5 mg po q 12h  ·	Cont Protonix 40 mg po daily  ·	ECHO reviewed. EF is 60-65%. Grade II diastolic dysfunction. Moderately enlarged right ventricle.  ·	CTA chest noted. Pulmonary embolus present in anterior segment of the right upper lobe  ·	Venous doppler of LE is negative for DVT  ·	Hem/Onc eval for unprovoked PE.   ·	LDL is 89. Increase Lipitor to 80 mg po qhs.   ·	Monitor FS. On Lispro and Lantus.     Progress Note Handoff    Pending (specify):  Consults_________, Tests________, Test Results_______, Other__Anticipate d/c in AM_______  Family discussion: With the wife on bedside about diagnosis and plan of care.   Disposition: Home___/SNF___/Other________/Unknown at this time________    Ramon Ruelas MD  Spectra: [phone]

## 2025-02-26 NOTE — PROGRESS NOTE ADULT - SUBJECTIVE AND OBJECTIVE BOX
RAHAT MARTINEZ 84y Male  MRN#: [id number]   Hospital Day: 3d    HPI:  84-year-old-year-old male history of diabetes, hypertension, hyperlipidemia presenting to ER for evaluation of 1 day of chest pain.  States since 11 PM last night has had midsternal chest pain described as a constant dull ache with associated shortness of breath.  Symptoms were worse with inspiration. Chest pain lasted around 45 mins and gradually subsided after arrival to ED. He has no associated fever, chills, cough, URI symptoms, leg pain or swelling, abdominal pain, nausea, vomiting or diarrhea. To note patient has sedentary lifestyle and limited mobility secondary to shortness of breath on exertion and peripheral neuropathy.    On presentation vitals:  · BP Systolic	 174 mm Hg  · BP Diastolic	89 mm Hg  · Heart Rate	76 /min  · Respiration Rate (breaths/min)	20 /min  · Temp (F)	97.6 Degrees F  · Temp (C)	36.4 Degrees C  · Temp site	oral  · SpO2 (%)	98 %  · O2 Delivery/Oxygen Delivery Method	room air    Labs are significant for Trop 39> 48, Cr 1.6.  EKG normal sinus rhythm, normal axis, Q waves and T wave inversions inferiorly, no ST depression or elevation.    CTA:  Pulmonary embolus present in anterior segment of the right upper lobe (series 4 image 87).  Right heart is relatively larger than the left. Given the small clot burden, it is doubtful that this reflects acute right heart strain and may reflect chronic changes. Echo for evaluation of right heart strain is suggested.    Patient admitted to medicine for PE and suspected NSTEMI.     (23 Feb 2025 17:16)      SUBJECTIVE  Patient is a 84y old Male who presents with a chief complaint of s/p PCI MEHDI (26 Feb 2025 11:23)  Currently admitted to medicine with the primary diagnosis of Pulmonary embolism      INTERVAL HPI AND OVERNIGHT EVENTS:  Patient was examined and seen at bedside. This morning he is resting comfortably in bed and reports no issues or overnight events.      OBJECTIVE  PAST MEDICAL & SURGICAL HISTORY  GAURAV on CPAP    DM (diabetes mellitus)    Cataract    HTN (hypertension)    Seizure  x1  2/2017    Gout    H/O heart artery stent  x1    H/O carpal tunnel repair  right hand    History of tonsillectomy      ALLERGIES:  No Known Allergies    MEDICATIONS:  STANDING MEDICATIONS  allopurinol 300 milliGRAM(s) Oral daily  amLODIPine   Tablet 2.5 milliGRAM(s) Oral daily  apixaban 10 milliGRAM(s) Oral every 12 hours  atorvastatin 80 milliGRAM(s) Oral at bedtime  clopidogrel Tablet 75 milliGRAM(s) Oral daily  dextrose 5%. 1000 milliLiter(s) IV Continuous <Continuous>  dextrose 5%. 1000 milliLiter(s) IV Continuous <Continuous>  dextrose 50% Injectable 25 Gram(s) IV Push once  dextrose 50% Injectable 12.5 Gram(s) IV Push once  dextrose 50% Injectable 25 Gram(s) IV Push once  furosemide    Tablet 20 milliGRAM(s) Oral daily  glucagon  Injectable 1 milliGRAM(s) IntraMuscular once  insulin glargine Injectable (LANTUS) 8 Unit(s) SubCutaneous at bedtime  insulin lispro (ADMELOG) corrective regimen sliding scale   SubCutaneous three times a day before meals  insulin lispro Injectable (ADMELOG) 2 Unit(s) SubCutaneous three times a day before meals  metoprolol succinate ER 25 milliGRAM(s) Oral daily  OXcarbazepine 300 milliGRAM(s) Oral two times a day  pantoprazole    Tablet 40 milliGRAM(s) Oral before breakfast  senna 2 Tablet(s) Oral at bedtime  sodium chloride 0.9%. 1000 milliLiter(s) IV Continuous <Continuous>  sodium chloride 0.9%. 1000 milliLiter(s) IV Continuous <Continuous>    PRN MEDICATIONS  dextrose Oral Gel 15 Gram(s) Oral once PRN      VITAL SIGNS: Last 24 Hours  T(C): 36.7 (26 Feb 2025 13:10), Max: 37 (25 Feb 2025 17:07)  T(F): 98 (26 Feb 2025 13:10), Max: 98.6 (25 Feb 2025 17:07)  HR: 69 (26 Feb 2025 13:10) (54 - 76)  BP: 123/74 (26 Feb 2025 13:10) (119/80 - 135/80)  BP(mean): --  RR: 18 (26 Feb 2025 13:10) (15 - 18)  SpO2: 96% (26 Feb 2025 04:28) (96% - 98%)    LABS:                        14.3   6.51  )-----------( 84       ( 26 Feb 2025 04:38 )             43.1     02-25    140  |  102  |  27[H]  ----------------------------<  131[H]  3.7   |  25  |  1.5    Ca    8.5      25 Feb 2025 19:14      PTT - ( 26 Feb 2025 06:13 )  PTT:84.6 sec  Urinalysis Basic - ( 25 Feb 2025 19:14 )    Color: x / Appearance: x / SG: x / pH: x  Gluc: 131 mg/dL / Ketone: x  / Bili: x / Urobili: x   Blood: x / Protein: x / Nitrite: x   Leuk Esterase: x / RBC: x / WBC x   Sq Epi: x / Non Sq Epi: x / Bacteria: x      PHYSICAL EXAM:  CONSTITUTIONAL: No acute distress  HEAD: Atraumatic, normocephalic  EYES: EOM intact, PERRLA, conjunctiva and sclera clear  ENT: Supple, no masses, no thyromegaly, no bruits, no JVD; moist mucous membranes  PULMONARY: Clear to auscultation bilaterally; no wheezes, rales, or rhonchi  CARDIOVASCULAR: Regular rate and rhythm; no murmurs, rubs, or gallops  GASTROINTESTINAL: Soft, non-tender, non-distended; bowel sounds present  MUSCULOSKELETAL: 2+ peripheral pulses; no clubbing, no cyanosis  NEUROLOGY: non-focal      ASSESSMENT & PLAN        84-year-old-year-old male with a pmhx of DM, HTN, HLD, GAURAV, CAD s/p PCI of distal circumflex (2020), seizure (2017 once) presenting with midsternal chest pain associated with sob that history of diabetes, hypertension, hyperlipidemia presenting to ER for evaluation of acute onset chest pain x~ 1 hour. Symptoms resolved since presentations     2/26/2025  - s/p cath, 3- Vessel CAD  - f/u heme-onc hypercoag w/u  - load eliquis  ------------    #CAD s/p stent to Lt circumflex in 2020 now with chest pain concerning for NSTEMI  #Acute PE  - CTA CHEST: Pulmonary embolus present in anterior segment of the right upper lobe  - ECG with TWI inferior leads  - s/p ASA loading, continue on asa 81mg qd and Heparin drip. Ptts per RN protocols  - 2/24 overnight team consulted cardio, who recommended above  - trops peaked, patient without CP/SOB  - pending cardio consult Dr Crouch/Sherley  - Small PE on CTA, suspect " incidental" as pt not tachy/not requiring any supplemental 02  - TTE 2/24 >  1. Technically difficult study.   2. Normal global left ventricular systolic function.   3. Left ventricular ejection fraction, by visual estimation, is 60 to   65%.   4. Spectral Doppler shows pseudonormal pattern of left ventricular   myocardial filling (Grade II diastolic dysfunction). Elevated mean left   atrial pressure.   5. Moderately enlarged right ventricle. Mildly reduced RV systolic   function.   6. Normal left atrial size.   7. Normal right atrial size.   8. Sclerotic aortic valve with normal opening.   9. Mild tricuspid regurgitation.  10. Trivial pericardial effusion.  11. Endocardial visualization was enhanced with intravenous echo contrast.  - c/w AC as above, NOAC on dc  - duplex neg  2/26 s/p cath: 3- Vessel CAD  - f/u heme-onc hypercoag w/u  - load eliquis    #CKDIIIA  - Cr at baseline  - c/w Furosemide 20mg OD    #DM  - check a1c  - insulin regimen and adjust based on FS

## 2025-02-26 NOTE — PROGRESS NOTE ADULT - SUBJECTIVE AND OBJECTIVE BOX
RAHAT MARTINEZ  84y Male    CHIEF COMPLAINT:    Patient is a 84y old  Male who presents with a chief complaint of s/p PCI MEHDI (26 Feb 2025 11:23)      INTERVAL HPI/OVERNIGHT EVENTS:    Patient seen and examined. No cp. No sob. S/P cath and PCI on 2/25    ROS: All other systems are negative.    Vital Signs:    T(F): 98 (02-26-25 @ 04:28), Max: 98.6 (02-25-25 @ 17:07)  HR: 76 (02-26-25 @ 09:59) (54 - 76)  BP: 133/77 (02-26-25 @ 04:28) (119/80 - 135/80)  RR: 18 (02-26-25 @ 04:28) (15 - 18)  SpO2: 96% (02-26-25 @ 04:28) (96% - 98%)  I&O's Summary    25 Feb 2025 07:01  -  26 Feb 2025 07:00  --------------------------------------------------------  IN: 479 mL / OUT: 650 mL / NET: -171 mL    26 Feb 2025 07:01  -  26 Feb 2025 13:05  --------------------------------------------------------  IN: 240 mL / OUT: 550 mL / NET: -310 mL      Daily     Daily   CAPILLARY BLOOD GLUCOSE      POCT Blood Glucose.: 173 mg/dL (26 Feb 2025 11:29)  POCT Blood Glucose.: 198 mg/dL (26 Feb 2025 07:31)  POCT Blood Glucose.: 139 mg/dL (25 Feb 2025 22:29)      PHYSICAL EXAM:    GENERAL:  NAD  SKIN: No rashes or lesions  HENT: Atraumatic. Normocephalic. PERRL. Moist membranes.  NECK: Supple, No JVD. No lymphadenopathy.  PULMONARY: CTA B/L. No wheezing. No rales  CVS: Normal S1, S2. Rate and Rhythm are regular. No murmurs.  ABDOMEN/GI: Soft, Nontender, Nondistended; BS present  EXTREMITIES: Peripheral pulses intact. No edema B/L LE.  NEUROLOGIC:  No motor or sensory deficit.  PSYCH: Alert & oriented x 3    Consultant(s) Notes Reviewed:  [x ] YES  [ ] NO  Care Discussed with Consultants/Other Providers [ x] YES  [ ] NO    EKG reviewed  Telemetry reviewed    LABS:                        14.3   6.51  )-----------( 84       ( 26 Feb 2025 04:38 )             43.1     02-25    140  |  102  |  27[H]  ----------------------------<  131[H]    Creatinine Trend: 1.5<--, 1.5<--, 1.6<--  3.7   |  25  |  1.5    Ca    8.5      25 Feb 2025 19:14      PTT - ( 26 Feb 2025 06:13 )  PTT:84.6 sec          RADIOLOGY & ADDITIONAL TESTS:      Imaging or report Personally Reviewed:  [ ] YES  [ ] NO    Medications:  Standing  allopurinol 300 milliGRAM(s) Oral daily  amLODIPine   Tablet 2.5 milliGRAM(s) Oral daily  apixaban 10 milliGRAM(s) Oral every 12 hours  atorvastatin 40 milliGRAM(s) Oral at bedtime  clopidogrel Tablet 75 milliGRAM(s) Oral daily  dextrose 5%. 1000 milliLiter(s) IV Continuous <Continuous>  dextrose 5%. 1000 milliLiter(s) IV Continuous <Continuous>  dextrose 50% Injectable 25 Gram(s) IV Push once  dextrose 50% Injectable 12.5 Gram(s) IV Push once  dextrose 50% Injectable 25 Gram(s) IV Push once  furosemide    Tablet 20 milliGRAM(s) Oral daily  glucagon  Injectable 1 milliGRAM(s) IntraMuscular once  insulin glargine Injectable (LANTUS) 8 Unit(s) SubCutaneous at bedtime  insulin lispro (ADMELOG) corrective regimen sliding scale   SubCutaneous three times a day before meals  insulin lispro Injectable (ADMELOG) 2 Unit(s) SubCutaneous three times a day before meals  metoprolol succinate ER 25 milliGRAM(s) Oral daily  OXcarbazepine 300 milliGRAM(s) Oral two times a day  pantoprazole    Tablet 40 milliGRAM(s) Oral before breakfast  senna 2 Tablet(s) Oral at bedtime  sodium chloride 0.9%. 1000 milliLiter(s) IV Continuous <Continuous>  sodium chloride 0.9%. 1000 milliLiter(s) IV Continuous <Continuous>    PRN Meds  dextrose Oral Gel 15 Gram(s) Oral once PRN      Case discussed with resident    Care discussed with pt/family

## 2025-02-27 ENCOUNTER — TRANSCRIPTION ENCOUNTER (OUTPATIENT)
Age: 85
End: 2025-02-27

## 2025-02-27 VITALS
RESPIRATION RATE: 18 BRPM | DIASTOLIC BLOOD PRESSURE: 78 MMHG | TEMPERATURE: 98 F | SYSTOLIC BLOOD PRESSURE: 130 MMHG | HEART RATE: 67 BPM

## 2025-02-27 LAB
GLUCOSE BLDC GLUCOMTR-MCNC: 149 MG/DL — HIGH (ref 70–99)
GLUCOSE BLDC GLUCOMTR-MCNC: 197 MG/DL — HIGH (ref 70–99)
HCT VFR BLD CALC: 42.8 % — SIGNIFICANT CHANGE UP (ref 42–52)
HGB BLD-MCNC: 14 G/DL — SIGNIFICANT CHANGE UP (ref 14–18)
MCHC RBC-ENTMCNC: 31.3 PG — HIGH (ref 27–31)
MCHC RBC-ENTMCNC: 32.7 G/DL — SIGNIFICANT CHANGE UP (ref 32–37)
MCV RBC AUTO: 95.7 FL — HIGH (ref 80–94)
NRBC BLD AUTO-RTO: 0 /100 WBCS — SIGNIFICANT CHANGE UP (ref 0–0)
PLATELET # BLD AUTO: 77 K/UL — LOW (ref 130–400)
PMV BLD: 11.9 FL — HIGH (ref 7.4–10.4)
RBC # BLD: 4.47 M/UL — LOW (ref 4.7–6.1)
RBC # FLD: 13.5 % — SIGNIFICANT CHANGE UP (ref 11.5–14.5)
TROPONIN T, HIGH SENSITIVITY RESULT: 105 NG/L — CRITICAL HIGH (ref 6–21)
WBC # BLD: 8.04 K/UL — SIGNIFICANT CHANGE UP (ref 4.8–10.8)
WBC # FLD AUTO: 8.04 K/UL — SIGNIFICANT CHANGE UP (ref 4.8–10.8)

## 2025-02-27 PROCEDURE — 99239 HOSP IP/OBS DSCHRG MGMT >30: CPT

## 2025-02-27 RX ORDER — FUROSEMIDE 10 MG/ML
1 INJECTION INTRAMUSCULAR; INTRAVENOUS
Qty: 30 | Refills: 3
Start: 2025-02-27 | End: 2025-06-26

## 2025-02-27 RX ORDER — METOPROLOL SUCCINATE 50 MG/1
1 TABLET, EXTENDED RELEASE ORAL
Qty: 30 | Refills: 3
Start: 2025-02-27 | End: 2025-06-26

## 2025-02-27 RX ORDER — CLOPIDOGREL BISULFATE 75 MG/1
1 TABLET, FILM COATED ORAL
Qty: 30 | Refills: 3
Start: 2025-02-27 | End: 2025-08-28

## 2025-02-27 RX ORDER — ATORVASTATIN CALCIUM 80 MG/1
1 TABLET, FILM COATED ORAL
Qty: 30 | Refills: 3
Start: 2025-02-27 | End: 2025-06-26

## 2025-02-27 RX ORDER — ATORVASTATIN CALCIUM 80 MG/1
1 TABLET, FILM COATED ORAL
Refills: 0 | DISCHARGE

## 2025-02-27 RX ORDER — ATORVASTATIN CALCIUM 80 MG/1
1 TABLET, FILM COATED ORAL
Qty: 0 | Refills: 0 | DISCHARGE
Start: 2025-02-27

## 2025-02-27 RX ORDER — CLOPIDOGREL BISULFATE 75 MG/1
1 TABLET, FILM COATED ORAL
Qty: 30 | Refills: 3
Start: 2025-02-27 | End: 2025-06-26

## 2025-02-27 RX ORDER — AMLODIPINE BESYLATE 10 MG/1
1 TABLET ORAL
Qty: 30 | Refills: 3
Start: 2025-02-27 | End: 2025-06-26

## 2025-02-27 RX ORDER — CLOPIDOGREL BISULFATE 75 MG/1
1 TABLET, FILM COATED ORAL
Qty: 0 | Refills: 0 | DISCHARGE
Start: 2025-02-27

## 2025-02-27 RX ORDER — APIXABAN 2.5 MG/1
2 TABLET, FILM COATED ORAL
Qty: 30 | Refills: 3
Start: 2025-02-27 | End: 2025-06-26

## 2025-02-27 RX ADMIN — APIXABAN 10 MILLIGRAM(S): 2.5 TABLET, FILM COATED ORAL at 13:52

## 2025-02-27 RX ADMIN — INSULIN LISPRO 2 UNIT(S): 100 INJECTION, SOLUTION INTRAVENOUS; SUBCUTANEOUS at 11:41

## 2025-02-27 RX ADMIN — FUROSEMIDE 20 MILLIGRAM(S): 10 INJECTION INTRAMUSCULAR; INTRAVENOUS at 05:16

## 2025-02-27 RX ADMIN — INSULIN LISPRO 1: 100 INJECTION, SOLUTION INTRAVENOUS; SUBCUTANEOUS at 11:41

## 2025-02-27 RX ADMIN — CLOPIDOGREL BISULFATE 75 MILLIGRAM(S): 75 TABLET, FILM COATED ORAL at 11:42

## 2025-02-27 RX ADMIN — Medication 300 MILLIGRAM(S): at 11:42

## 2025-02-27 RX ADMIN — INSULIN LISPRO 2 UNIT(S): 100 INJECTION, SOLUTION INTRAVENOUS; SUBCUTANEOUS at 08:19

## 2025-02-27 RX ADMIN — METOPROLOL SUCCINATE 25 MILLIGRAM(S): 50 TABLET, EXTENDED RELEASE ORAL at 05:16

## 2025-02-27 RX ADMIN — AMLODIPINE BESYLATE 2.5 MILLIGRAM(S): 10 TABLET ORAL at 05:17

## 2025-02-27 RX ADMIN — APIXABAN 10 MILLIGRAM(S): 2.5 TABLET, FILM COATED ORAL at 02:46

## 2025-02-27 RX ADMIN — Medication 40 MILLIGRAM(S): at 05:16

## 2025-02-27 RX ADMIN — OXCARBAZEPINE 300 MILLIGRAM(S): 150 TABLET, FILM COATED ORAL at 05:17

## 2025-02-27 NOTE — PHARMACOTHERAPY INTERVENTION NOTE - COMMENTS
Patient with PE to be sent home on apixaban 10 mg po x 5 more days upon discharge and then decrease dose to 5 mg po BID. Prescription was sent to patient[facility]. Attempted to call pharmacy several times to determine pricing, unfortunately unable to get through. If patient still here tomorrow will attempt again.
Patient with PE started on anticoagulation, also s/p PCI with MEHDI started on clopidogrel. Recommend starting PPI with pantoprazole 40 mg po daily to reduce risk of GI bleed.
Patient with PE to be started on apixaban 10 mg po BID x 7 days and then lower to 5 mg po BID. Also counseled patient on clopidogrel 75 mg po daily patient states he took this at some point in the past. Provided patient with information and answered all questions. Left patient with medication education cards.

## 2025-02-27 NOTE — PROGRESS NOTE ADULT - ASSESSMENT
1]  Atypical Chest Pain - Acute Pulmonary Embolism  - Findings on CTA Chest noted  - Echo shows dilated RV with hypokinesis.  LVEF normal  - OAC with Eliquis (PE Protocol) started  - Workup for hypercoagulable disorder to be done as outpatient.    2] CAD S/P PTCA/Stent      Troponinemia with abnormal ECG - etiology Type 2 MI  - Findings on cardiac catheterization discussed.  Non-dominant RCA occluded with thrombus (infarct-related artery).  No PCI done on RCA since it is a non-dominant vessel.  - Severe in-stent restenosis of LAD which was successfully intervened  - On Plavix 75 mg tablet daily.  No Aspirin since he is on OAC with Eliquis (PE protocol)    3] HTN  - continue to monitor    4] Type II DM  - Glycemic control    5] Hyperlipidemia  - On statin therapy.  LDL goal less than 55 mg/dL.    6] Chronic Kidney Disease  - Continue to monitor creatinine    Discharge planning per primary team.  Plan discussed with Medical Attending Dr. Ruelas  Patient will follow up with primary cardiologist, Dr. Reed.    Rojelio Lepe MD(covering for Dr. Alec Reed)  On TEAMS  [phone] Office

## 2025-02-27 NOTE — DISCHARGE NOTE PROVIDER - [id number]_CARD_A_CORE
How Did The Hair Loss Occur?: gradual in onset How Severe Is Your Hair Loss?: mild Additional History: Thinning after each pregnancy Yes

## 2025-02-27 NOTE — PROGRESS NOTE ADULT - SUBJECTIVE AND OBJECTIVE BOX
RAHAT MARTINEZ  84y Male    CHIEF COMPLAINT:    Patient is a 84y old  Male who presents with a chief complaint of s/p PCI MEHDI (27 Feb 2025 10:18)      INTERVAL HPI/OVERNIGHT EVENTS:    Patient seen and examined.    ROS: All other systems are negative.    Vital Signs:    T(F): 98 (02-27-25 @ 04:09), Max: 98.2 (02-26-25 @ 19:08)  HR: 63 (02-27-25 @ 04:09) (63 - 73)  BP: 146/85 (02-27-25 @ 04:09) (118/72 - 146/85)  RR: 18 (02-27-25 @ 04:09) (18 - 18)  SpO2: 97% (02-27-25 @ 04:09) (97% - 97%)  I&O's Summary    26 Feb 2025 07:01  -  27 Feb 2025 07:00  --------------------------------------------------------  IN: 720 mL / OUT: 1900 mL / NET: -1180 mL      Daily     Daily   CAPILLARY BLOOD GLUCOSE      POCT Blood Glucose.: 197 mg/dL (27 Feb 2025 11:16)  POCT Blood Glucose.: 149 mg/dL (27 Feb 2025 07:42)  POCT Blood Glucose.: 152 mg/dL (26 Feb 2025 20:59)  POCT Blood Glucose.: 202 mg/dL (26 Feb 2025 16:35)      PHYSICAL EXAM:    GENERAL:  NAD  SKIN: No rashes or lesions  HENT: Atraumatic. Normocephalic. PERRL. Moist membranes.  NECK: Supple, No JVD. No lymphadenopathy.  PULMONARY: CTA B/L. No wheezing. No rales  CVS: Normal S1, S2. Rate and Rhythm are regular. No murmurs.  ABDOMEN/GI: Soft, Nontender, Nondistended; BS present  EXTREMITIES: Peripheral pulses intact. No edema B/L LE.  NEUROLOGIC:  No motor or sensory deficit.  PSYCH: Alert & oriented x 3    Consultant(s) Notes Reviewed:  [x ] YES  [ ] NO  Care Discussed with Consultants/Other Providers [ x] YES  [ ] NO    EKG reviewed  Telemetry reviewed    LABS:                        14.0   8.04  )-----------( 77       ( 27 Feb 2025 04:38 )             42.8     02-25    140  |  102  |  27[H]  ----------------------------<  131[H]  3.7   |  25  |  1.5    Ca    8.5      25 Feb 2025 19:14      PTT - ( 26 Feb 2025 06:13 )  PTT:84.6 sec          RADIOLOGY & ADDITIONAL TESTS:      Imaging or report Personally Reviewed:  [ ] YES  [ ] NO    Medications:  Standing  allopurinol 300 milliGRAM(s) Oral daily  amLODIPine   Tablet 2.5 milliGRAM(s) Oral daily  apixaban 10 milliGRAM(s) Oral every 12 hours  atorvastatin 80 milliGRAM(s) Oral at bedtime  clopidogrel Tablet 75 milliGRAM(s) Oral daily  dextrose 5%. 1000 milliLiter(s) IV Continuous <Continuous>  dextrose 5%. 1000 milliLiter(s) IV Continuous <Continuous>  dextrose 50% Injectable 25 Gram(s) IV Push once  dextrose 50% Injectable 12.5 Gram(s) IV Push once  dextrose 50% Injectable 25 Gram(s) IV Push once  furosemide    Tablet 20 milliGRAM(s) Oral daily  glucagon  Injectable 1 milliGRAM(s) IntraMuscular once  insulin glargine Injectable (LANTUS) 8 Unit(s) SubCutaneous at bedtime  insulin lispro (ADMELOG) corrective regimen sliding scale   SubCutaneous three times a day before meals  insulin lispro Injectable (ADMELOG) 2 Unit(s) SubCutaneous three times a day before meals  metoprolol succinate ER 25 milliGRAM(s) Oral daily  OXcarbazepine 300 milliGRAM(s) Oral two times a day  pantoprazole    Tablet 40 milliGRAM(s) Oral before breakfast  senna 2 Tablet(s) Oral at bedtime  sodium chloride 0.9%. 1000 milliLiter(s) IV Continuous <Continuous>  sodium chloride 0.9%. 1000 milliLiter(s) IV Continuous <Continuous>    PRN Meds  dextrose Oral Gel 15 Gram(s) Oral once PRN      Case discussed with resident    Care discussed with pt/family           RAHAT MARTINEZ  84y Male    CHIEF COMPLAINT:    Patient is a 84y old  Male who presents with a chief complaint of s/p PCI MEHDI (27 Feb 2025 10:18)      INTERVAL HPI/OVERNIGHT EVENTS:    Patient seen and examined. Earlier had cp which now has resolved. Pt is walking and used stairs also but had no cp on exertion. No sob.     ROS: All other systems are negative.    Vital Signs:    T(F): 98 (02-27-25 @ 04:09), Max: 98.2 (02-26-25 @ 19:08)  HR: 63 (02-27-25 @ 04:09) (63 - 73)  BP: 146/85 (02-27-25 @ 04:09) (118/72 - 146/85)  RR: 18 (02-27-25 @ 04:09) (18 - 18)  SpO2: 97% (02-27-25 @ 04:09) (97% - 97%)  I&O's Summary    26 Feb 2025 07:01  -  27 Feb 2025 07:00  --------------------------------------------------------  IN: 720 mL / OUT: 1900 mL / NET: -1180 mL      Daily     Daily   CAPILLARY BLOOD GLUCOSE      POCT Blood Glucose.: 197 mg/dL (27 Feb 2025 11:16)  POCT Blood Glucose.: 149 mg/dL (27 Feb 2025 07:42)  POCT Blood Glucose.: 152 mg/dL (26 Feb 2025 20:59)  POCT Blood Glucose.: 202 mg/dL (26 Feb 2025 16:35)      PHYSICAL EXAM:    GENERAL:  NAD  SKIN: No rashes or lesions  HENT: Atraumatic. Normocephalic. PERRL. Moist membranes.  NECK: Supple, No JVD. No lymphadenopathy.  PULMONARY: CTA B/L. No wheezing. No rales  CVS: Normal S1, S2. Rate and Rhythm are regular. No murmurs.  ABDOMEN/GI: Soft, Nontender, Nondistended; BS present  EXTREMITIES: Peripheral pulses intact. No edema B/L LE.  NEUROLOGIC:  No motor or sensory deficit.  PSYCH: Alert & oriented x 3    Consultant(s) Notes Reviewed:  [x ] YES  [ ] NO  Care Discussed with Consultants/Other Providers [ x] YES  [ ] NO    EKG reviewed  Telemetry reviewed    LABS:                        14.0   8.04  )-----------( 77       ( 27 Feb 2025 04:38 )             42.8     02-25    140  |  102  |  27[H]  ----------------------------<  131[H]  3.7   |  25  |  1.5    Ca    8.5      25 Feb 2025 19:14      PTT - ( 26 Feb 2025 06:13 )  PTT:84.6 sec          RADIOLOGY & ADDITIONAL TESTS:      Imaging or report Personally Reviewed:  [ ] YES  [ ] NO    Medications:  Standing  allopurinol 300 milliGRAM(s) Oral daily  amLODIPine   Tablet 2.5 milliGRAM(s) Oral daily  apixaban 10 milliGRAM(s) Oral every 12 hours  atorvastatin 80 milliGRAM(s) Oral at bedtime  clopidogrel Tablet 75 milliGRAM(s) Oral daily  dextrose 5%. 1000 milliLiter(s) IV Continuous <Continuous>  dextrose 5%. 1000 milliLiter(s) IV Continuous <Continuous>  dextrose 50% Injectable 25 Gram(s) IV Push once  dextrose 50% Injectable 12.5 Gram(s) IV Push once  dextrose 50% Injectable 25 Gram(s) IV Push once  furosemide    Tablet 20 milliGRAM(s) Oral daily  glucagon  Injectable 1 milliGRAM(s) IntraMuscular once  insulin glargine Injectable (LANTUS) 8 Unit(s) SubCutaneous at bedtime  insulin lispro (ADMELOG) corrective regimen sliding scale   SubCutaneous three times a day before meals  insulin lispro Injectable (ADMELOG) 2 Unit(s) SubCutaneous three times a day before meals  metoprolol succinate ER 25 milliGRAM(s) Oral daily  OXcarbazepine 300 milliGRAM(s) Oral two times a day  pantoprazole    Tablet 40 milliGRAM(s) Oral before breakfast  senna 2 Tablet(s) Oral at bedtime  sodium chloride 0.9%. 1000 milliLiter(s) IV Continuous <Continuous>  sodium chloride 0.9%. 1000 milliLiter(s) IV Continuous <Continuous>    PRN Meds  dextrose Oral Gel 15 Gram(s) Oral once PRN      Case discussed with resident    Care discussed with pt/family

## 2025-02-27 NOTE — DISCHARGE NOTE NURSING/CASE MANAGEMENT/SOCIAL WORK - FINANCIAL ASSISTANCE
[facility] provides services at a reduced cost to those who are determined to be eligible through [facility]’s financial assistance program. Information regarding [facility]’s financial assistance program can be found by going to [email or web address] or by calling [phone].

## 2025-02-27 NOTE — PROGRESS NOTE ADULT - PROVIDER SPECIALTY LIST ADULT
Cardiology
Cardiology
Hospitalist
Internal Medicine
Internal Medicine
Intervent Cardiology
Hospitalist
Internal Medicine
Hospitalist
Internal Medicine
Intervent Cardiology

## 2025-02-27 NOTE — PROGRESS NOTE ADULT - SUBJECTIVE AND OBJECTIVE BOX
Cardiology Follow up s/p PCI    RAHAT MARTINEZ   84y Male  PAST MEDICAL & SURGICAL HISTORY:    GAURAV on CPAP    DM (diabetes mellitus)    Cataract    HTN (hypertension)    Seizure  x1  2/2017    Gout    H/O heart artery stent  x1    H/O carpal tunnel repair  right hand    History of tonsillectomy           HPI:  84-year-old-year-old male history of diabetes, hypertension, hyperlipidemia presenting to ER for evaluation of 1 day of chest pain.  States since 11 PM last night has had midsternal chest pain described as a constant dull ache with associated shortness of breath.  Symptoms were worse with inspiration. Chest pain lasted around 45 mins and gradually subsided after arrival to ED. He has no associated fever, chills, cough, URI symptoms, leg pain or swelling, abdominal pain, nausea, vomiting or diarrhea. To note patient has sedentary lifestyle and limited mobility secondary to shortness of breath on exertion and peripheral neuropathy.    Allergies    No Known Allergies    Intolerances    Patient seen and examined at bedside. No acute events overnight.  Patient without complaints. Pt ambulated without issues/symptoms with assistance  Denies SOB, palpitations, or dizziness  Patient reported chest discomfort in early AM, feels better after burping, 12 lead ECG done - no new ischemic changes  PVC's noted on telemetry overnight    Vital Signs Last 24 Hrs  T(C): 36.7 (27 Feb 2025 04:09), Max: 36.8 (26 Feb 2025 19:08)  T(F): 98 (27 Feb 2025 04:09), Max: 98.2 (26 Feb 2025 19:08)  HR: 63 (27 Feb 2025 04:09) (63 - 73)  BP: 146/85 (27 Feb 2025 04:09) (118/72 - 146/85)  BP(mean): 105 (27 Feb 2025 04:09) (105 - 105)  RR: 18 (27 Feb 2025 04:09) (18 - 18)  SpO2: 97% (27 Feb 2025 04:09) (97% - 97%)    Parameters below as of 26 Feb 2025 19:08  Patient On (Oxygen Delivery Method): room air        MEDICATIONS  (STANDING):  allopurinol 300 milliGRAM(s) Oral daily  amLODIPine   Tablet 2.5 milliGRAM(s) Oral daily  apixaban 10 milliGRAM(s) Oral every 12 hours  atorvastatin 80 milliGRAM(s) Oral at bedtime  clopidogrel Tablet 75 milliGRAM(s) Oral daily  dextrose 5%. 1000 milliLiter(s) (100 mL/Hr) IV Continuous <Continuous>  dextrose 5%. 1000 milliLiter(s) (50 mL/Hr) IV Continuous <Continuous>  dextrose 50% Injectable 25 Gram(s) IV Push once  dextrose 50% Injectable 12.5 Gram(s) IV Push once  dextrose 50% Injectable 25 Gram(s) IV Push once  furosemide    Tablet 20 milliGRAM(s) Oral daily  glucagon  Injectable 1 milliGRAM(s) IntraMuscular once  insulin glargine Injectable (LANTUS) 8 Unit(s) SubCutaneous at bedtime  insulin lispro (ADMELOG) corrective regimen sliding scale   SubCutaneous three times a day before meals  insulin lispro Injectable (ADMELOG) 2 Unit(s) SubCutaneous three times a day before meals  metoprolol succinate ER 25 milliGRAM(s) Oral daily  OXcarbazepine 300 milliGRAM(s) Oral two times a day  pantoprazole    Tablet 40 milliGRAM(s) Oral before breakfast  senna 2 Tablet(s) Oral at bedtime  sodium chloride 0.9%. 1000 milliLiter(s) (75 mL/Hr) IV Continuous <Continuous>  sodium chloride 0.9%. 1000 milliLiter(s) (100 mL/Hr) IV Continuous <Continuous>    MEDICATIONS  (PRN):  dextrose Oral Gel 15 Gram(s) Oral once PRN Blood Glucose LESS THAN 70 milliGRAM(s)/deciliter      REVIEW OF SYSTEMS:          All negative except as mentioned in HPI    PHYSICAL EXAM:           CONSTITUTIONAL: Well-developed; well-nourished; in no acute distress  	SKIN: warm, dry  	HEAD: Normocephalic; atraumatic  	EYES: PERRL.  	ENT: No nasal discharge, airway clear, mucous membranes moist  	NECK: Supple; non tender.  	CARD: +S1, +S2, no murmurs, gallops, or rubs. Regular rate and rhythm    	RESP: No wheezes, rales or rhonchi. CTA B/L  	ABD: soft ntnd, + BS x 4 quadrants  	EXT: moves all extremities,  no clubbing, cyanosis or edema  	NEURO: Alert and oriented x3, no focal deficits          PSYCH: Cooperative, appropriate          VASCULAR:  + Rad / + PTs / +  DPs          EXTREMITY:              Right Groin: access site soft, no hematoma, no pain, + pulses, no sign of infection, no numbness  	           2D ECHO:  < from: TTE Echo Complete w/o Contrast w/ Doppler (02.24.25 @ 09:29) >  Summary:   1. Technically difficult study.   2. Normal global left ventricular systolic function.   3. Left ventricular ejection fraction, by visual estimation, is 60 to   65%.   4. Spectral Doppler shows pseudonormal pattern of left ventricular   myocardial filling (Grade II diastolic dysfunction). Elevated mean left   atrial pressure.   5. Moderately enlarged right ventricle. Mildly reduced RV systolic   function.   6. Normal left atrial size.   7. Normal right atrial size.   8. Sclerotic aortic valve with normal opening.   9. Mild tricuspid regurgitation.  10. Trivial pericardial effusion.  11. Endocardial visualization was enhanced with intravenous echo contrast.    ECG:  < from: 12 Lead ECG (02.26.25 @ 07:45) >    Ventricular Rate 58 BPM    Atrial Rate 58 BPM    P-R Interval 180 ms    QRS Duration 112 ms    Q-T Interval 444 ms    QTC Calculation(Bazett) 435 ms    P Axis -11 degrees    R Axis -21 degrees    T Axis -3 degrees    Diagnosis Line Sinus bradycardia  Incomplete right bundle branch block  Nonspecific T wave abnormality  Abnormal ECG    Confirmed by Jani Martinez ([id number]) on 2/26/2025 8:55:20 PM                                                                                        LABS:                          14.0   8.04  )-----------( 77       ( 27 Feb 2025 04:38 )             42.8     02-25    140  |  102  |  27[H]  ----------------------------<  131[H]  3.7   |  25  |  1.5    Ca    8.5      25 Feb 2025 19:14    PTT - ( 26 Feb 2025 06:13 )  PTT:84.6 sec    LDL Cholesterol Calculated: 89  A1C with Estimated Average Glucose Result: 7.8      A/P:  I discussed the case with Cardiologist Dr. Lepe and recommend the following:  S/P PCI: MEHDI                                       Ambulate around the unit with assistance                    c/s Hematology                    No ACEi/ARB due to elevated Cr  	     Continue Plavix 75 mg PO Daily, Lasix, CCB, Eliquis, Statin Therapy, B-Blocker, PPI                   Patient agreeing to take APT/AC as directed by cardiologist                    OOB to chair with assistance, ambulate around the unit                    Keep K = 4, Mg = 2                   Monitor right groin access site, Hg and Cr, Plt                             Pt given instructions on importance of taking antiplatelet medication or risk acute stent thrombosis/death                   Post cath instructions, access site care and activity restrictions reviewed with patient                     Cardiac rehab information provided/referral and communication to Cardiac Rehab completed                      Benefits of Cardiac Rehab discussed with patient                   Patient instructed to call Cardiac Rehab and make first appointment after first f/u visit with Cardiologist                    Discussed with patient to return to hospital if experience chest pain, shortness breath, dizziness and site bleeding                   Aggressive risk factor modification, diet counseling, smoking cessation discussed with patient                       Follow up with Cardiology Dr. Mcmahon in two weeks. Patient instructed to call and make an appointment                     Discharge instructions as follows, when ready to d/c:    Activity:  - Do not drive or operate heavy machinery for 24 hours.  - Limit your physical or any strenuous activity for 2 weeks after angioplasty and 48 hours for angiogram. Support the groin site with your hand when you sneeze or cough. No heavy lifting ( objects more then 10 pounds).  - For wrist access, avoid using affected arm for 24 hours after removal of dressing and avoid heavy lifting for 7 days.  Hygiene:  - After 24 hours, you may shower and remove the dressing from the site. Do not tub bathe for one week. Do not rub or apply lotion, cream, powder to the affected site. Leave it open to air.   Diet:   - You may resume your diet. Low Sodium. Low Fat, Low Cholesterol.  If Diabetic - Carbohydrate Consistent Diet.      - Drink extra fluid unless otherwise advised.   Special Instructions:  - Bruising or black and blue at the puncture site is possible.  - If there is bleeding from the puncture site (groin or wrist) apply direct firm pressure on the site and call 911.  - Any sudden swelling, redness, fever, discharge or severe pain, call your physician or call the cath lab.   - If you notice any scab formation in the area avoid touching the site and allow it to heal.  - Numbness or "pins and needle" sensation in the affected arm, hand, leg or if the affected site become cool to touch or pale that persist for extended period of time call your physician immediately to be checked.  - If you developed chest pain, not relieved by your usual routine medication, fainting, lethargy, weakness, report to the nearest emergency room.   - Inform your Dentist or Surgeon if you are taking Aspirin or any antiplatelet medications. Report any bleeding in your urine or stool.   Medications:  - Soreness or tenderness at the site is possible it will diminish over time. You may take Tylenol every 4-6 hours as needed. Nothing stronger is needed.  - If you are diabetic and taking medication containing Metformin, do not take them for 48 hours after the procedure.     Any questions call Cardiac Cath Lab at [phone] or [phone], Monday - Friday from 7 - 9 pm.

## 2025-02-27 NOTE — DISCHARGE NOTE PROVIDER - CARE PROVIDER_API CALL
Rojelio Lepe  Cardiovascular Disease  [address], [address]  [place] [address]  Phone: [phone]  Fax: [phone]  Established Patient  Follow Up Time: 1 week

## 2025-02-27 NOTE — DISCHARGE NOTE NURSING/CASE MANAGEMENT/SOCIAL WORK - NSDCPEFALRISK_GEN_ALL_CORE
For information on Fall & Injury Prevention, visit: [email or web address] OR  [email or web address] OR  https://www.cdc.gov/steadi/patient.html

## 2025-02-27 NOTE — PROGRESS NOTE ADULT - ASSESSMENT
84-year-old-year-old male history of diabetes, hypertension, hyperlipidemia presenting to ER for evaluation of 1 day of chest pain.  States since 11 PM last night has had midsternal chest pain described as a constant dull ache with associated shortness of breath.  Symptoms were worse with inspiration. Chest pain lasted around 45 minutes and gradually subsided after arrival to ED.     Acute PE  NSTEMI  DM-2 / HTN / DL               PLAN:    ·	Tele reviewed by me. No events.   ·	EKG on admission: NSR 70/min. RBB. Q waves in leads III and avf (Interpreted by me)  ·	Troponin: 48-->86-->99-->132-->137  ·	S/P cath o 2/25. Called the cardiologist and discussed care with him. Instent thrombosis of LAD s/p MEHDI. Tight lesion LCX. Plan for staged PCI in one month. RCA occulusion  ·	Cardiology recommended to d/c ASA and cont Eliquis and Plavix  ·	D/C IV Heparin. Eliquis 10 mg po q 12h for one week, then 5 mg po q 12h  ·	Cont Protonix 40 mg po daily  ·	ECHO reviewed. EF is 60-65%. Grade II diastolic dysfunction. Moderately enlarged right ventricle.  ·	CTA chest noted. Pulmonary embolus present in anterior segment of the right upper lobe  ·	Venous doppler of LE is negative for DVT  ·	Hem/Onc eval for unprovoked PE.   ·	LDL is 89. Increase Lipitor to 80 mg po qhs.   ·	Monitor FS. On Lispro and Lantus.     Progress Note Handoff    Pending (specify):  Consults_________, Tests________, Test Results_______, Other__Anticipate d/c in AM_______  Family discussion: With the wife on bedside about diagnosis and plan of care.   Disposition: Home___/SNF___/Other________/Unknown at this time________    Ramon Ruelas MD  Spectra: [id number] 84-year-old-year-old male history of diabetes, hypertension, hyperlipidemia presenting to ER for evaluation of 1 day of chest pain.  States since 11 PM last night has had midsternal chest pain described as a constant dull ache with associated shortness of breath.  Symptoms were worse with inspiration. Chest pain lasted around 45 minutes and gradually subsided after arrival to ED.     Acute PE  NSTEMI  DM-2 / HTN / DL               PLAN:    ·	Tele reviewed by me. No events.   ·	EKG on admission: NSR 70/min. RBB. Q waves in leads III and avf (Interpreted by me)  ·	Troponin: 48-->86-->99-->132-->137  ·	S/P cath o 2/25. Called the cardiologist and discussed care with him. Instent thrombosis of LAD s/p MEHDI. Tight lesion LCX. Plan for staged PCI in one month. RCA occulusion  ·	Cardiology recommended to d/c ASA and cont Eliquis and Plavix  ·	Cont Eliquis 10 mg po q 12h for one week, then 5 mg po q 12h  ·	Care d/w the cardiologist today. Cleared the pt to discharge.   ·	Cont Protonix 40 mg po daily  ·	ECHO reviewed. EF is 60-65%. Grade II diastolic dysfunction. Moderately enlarged right ventricle.  ·	CTA chest noted. Pulmonary embolus present in anterior segment of the right upper lobe  ·	Venous doppler of LE is negative for DVT  ·	Hem/Onc eval for unprovoked PE.   ·	LDL is 89. Cont Lipitor 80 mg po qhs.   ·	Can d/c him home after seen by Hem/Onc    * Med rec reviewed. Plan of care d/w the pt and his wife on bedside. Time spent 42 minutes.

## 2025-02-27 NOTE — DISCHARGE NOTE PROVIDER - NSDCFUSCHEDAPPT_GEN_ALL_CORE_FT
Rojelio DE JESUS  [facility] Physician [facility]  UROLOGY [id number]  Scheduled Appointment: 05/07/2025

## 2025-02-27 NOTE — DISCHARGE NOTE PROVIDER - HOSPITAL COURSE
HPI on Admission (2/23/2025):    84-year-old-year-old male history of diabetes, hypertension, hyperlipidemia presenting to ER for evaluation of 1 day of chest pain.  States since 11 PM last night has had midsternal chest pain described as a constant dull ache with associated shortness of breath.  Symptoms were worse with inspiration. Chest pain lasted around 45 mins and gradually subsided after arrival to ED. He has no associated fever, chills, cough, URI symptoms, leg pain or swelling, abdominal pain, nausea, vomiting or diarrhea. To note patient has sedentary lifestyle and limited mobility secondary to shortness of breath on exertion and peripheral neuropathy.    On presentation vitals:  · BP Systolic	 174 mm Hg  · BP Diastolic	89 mm Hg  · Heart Rate	76 /min  · Respiration Rate (breaths/min)	20 /min  · Temp (F)	97.6 Degrees F  · Temp (C)	36.4 Degrees C  · Temp site	oral  · SpO2 (%)	98 %  · O2 Delivery/Oxygen Delivery Method	room air    Labs are significant for Trop 39> 48, Cr 1.6.  EKG normal sinus rhythm, normal axis, Q waves and T wave inversions inferiorly, no ST depression or elevation.    CTA:  Pulmonary embolus present in anterior segment of the right upper lobe (series 4 image 87).  Right heart is relatively larger than the left. Given the small clot burden, it is doubtful that this reflects acute right heart strain and may reflect chronic changes. Echo for evaluation of right heart strain is suggested.    Patient admitted to medicine for PE and suspected NSTEMI.    Treated for the following.    #Acute PE  #NSTEMI  #DM-2 / HTN / DL   -EKG on admission: NSR 70/min. RBB. Q waves in leads III and avf (Interpreted by me)  -Troponin: 48-->86-->99-->132-->137  -S/P cath o 2/25. Called the cardiologist and discussed care with him. Instent thrombosis of LAD s/p MEHDI. Tight lesion LCX. Plan for staged PCI in one month. RCA occulusion  -Cardiology recommended to d/c ASA and cont Eliquis and Plavix  -Cont Eliquis 10 mg po q 12h for one week, then 5 mg po q 12h  -Care d/w the cardiologist today. Cleared the pt to discharge.   -Cont Protonix 40 mg po daily  -ECHO reviewed. EF is 60-65%. Grade II diastolic dysfunction. Moderately enlarged right ventricle.  -CTA chest noted. Pulmonary embolus present in anterior segment of the right upper lobe  -Venous doppler of LE is negative for DVT  -Hem/Onc eval for unprovoked PE (OP)  -LDL is 89. Cont Lipitor 80 mg po qhs.   -Can d/c him home after seen by Hem/Onc    2/27 DC discussed with attending physician Dr. Ruelas. Pt deemed stable for DC.

## 2025-02-27 NOTE — PROGRESS NOTE ADULT - SUBJECTIVE AND OBJECTIVE BOX
Vitals:  T(C): 36.8 (02-27-25 @ 13:44), Max: 37 (02-25-25 @ 17:07)  HR: 67 (02-27-25 @ 13:44) (54 - 76)  BP: 130/78 (02-27-25 @ 13:44) (118/72 - 146/85)  RR: 18 (02-27-25 @ 13:44) (15 - 18)  SpO2: 97% (02-27-25 @ 04:09) (96% - 98%)  ECG:    LABS:                        14.0   8.04  )-----------( 77       ( 27 Feb 2025 04:38 )             42.8     02-25    140  |  102  |  27[H]  ----------------------------<  131[H]  3.7   |  25  |  1.5    Ca    8.5      25 Feb 2025 19:14      PTT - ( 26 Feb 2025 06:13 )  PTT:84.6 sec  MEDICATIONS  (STANDING):  allopurinol 300 milliGRAM(s) Oral daily  amLODIPine   Tablet 2.5 milliGRAM(s) Oral daily  apixaban 10 milliGRAM(s) Oral every 12 hours  atorvastatin 80 milliGRAM(s) Oral at bedtime  clopidogrel Tablet 75 milliGRAM(s) Oral daily  dextrose 5%. 1000 milliLiter(s) (100 mL/Hr) IV Continuous <Continuous>  dextrose 5%. 1000 milliLiter(s) (50 mL/Hr) IV Continuous <Continuous>  dextrose 50% Injectable 25 Gram(s) IV Push once  dextrose 50% Injectable 12.5 Gram(s) IV Push once  dextrose 50% Injectable 25 Gram(s) IV Push once  furosemide    Tablet 20 milliGRAM(s) Oral daily  glucagon  Injectable 1 milliGRAM(s) IntraMuscular once  insulin glargine Injectable (LANTUS) 8 Unit(s) SubCutaneous at bedtime  insulin lispro (ADMELOG) corrective regimen sliding scale   SubCutaneous three times a day before meals  insulin lispro Injectable (ADMELOG) 2 Unit(s) SubCutaneous three times a day before meals  metoprolol succinate ER 25 milliGRAM(s) Oral daily  OXcarbazepine 300 milliGRAM(s) Oral two times a day  pantoprazole    Tablet 40 milliGRAM(s) Oral before breakfast  senna 2 Tablet(s) Oral at bedtime  sodium chloride 0.9%. 1000 milliLiter(s) (75 mL/Hr) IV Continuous <Continuous>  sodium chloride 0.9%. 1000 milliLiter(s) (100 mL/Hr) IV Continuous <Continuous>    MEDICATIONS  (PRN):  dextrose Oral Gel 15 Gram(s) Oral once PRN Blood Glucose LESS THAN 70 milliGRAM(s)/deciliter      PHYSICAL EXAM:    Constitutional: appears stated age, well developed/nourished, no acute distress  Eyes: EOM's intact.  PERRLA  ENMT: Normocephalic, atraumatic.  Clear oropharynx.  No ear discharge.  Neck: Jugular veins non-distended; no carotid bruits bilaterally.  Respiratory: respiratory pattern unlabored; no dullness to percussion; lungs clear to auscultation bilaterally.  Cardiovascular: Regular rhythm.  S1 and S2 normal.  No murmur nor rub appreciated.  Abdomen: Soft, non-tender.  Normal bowel sounds.  Extremities: extremities warm; no cyanosis, clubbing or edema.  Pulses: Intact bilaterally  Skin: No gross abnormalities noted.  Musculoskeletal: No gross deformities  Neurological: Alert, oriented x 3.  No focal neurologic deficits noted.           Patient was seen and examined earlier today by me on 3C.  EMR reviewed.  Wife in the room at bedside.    No new complaints.  He ambulated on 3C without experiencing any chest pain and shortness of breath.    Vitals:  T(C): 36.8 (02-27-25 @ 13:44), Max: 37 (02-25-25 @ 17:07)  HR: 67 (02-27-25 @ 13:44) (54 - 76)  BP: 130/78 (02-27-25 @ 13:44) (118/72 - 146/85)  RR: 18 (02-27-25 @ 13:44) (15 - 18)  SpO2: 97% (02-27-25 @ 04:09) (96% - 98%)    Telemetry: Sinus Rhythm    LABS:                        14.0   8.04  )-----------( 77       ( 27 Feb 2025 04:38 )             42.8     02-25    140  |  102  |  27[H]  ----------------------------<  131[H]  3.7   |  25  |  1.5    Ca    8.5      25 Feb 2025 19:14      PTT - ( 26 Feb 2025 06:13 )  PTT:84.6 sec  MEDICATIONS  (STANDING):  allopurinol 300 milliGRAM(s) Oral daily  amLODIPine   Tablet 2.5 milliGRAM(s) Oral daily  apixaban 10 milliGRAM(s) Oral every 12 hours  atorvastatin 80 milliGRAM(s) Oral at bedtime  clopidogrel Tablet 75 milliGRAM(s) Oral daily  dextrose 5%. 1000 milliLiter(s) (100 mL/Hr) IV Continuous <Continuous>  dextrose 5%. 1000 milliLiter(s) (50 mL/Hr) IV Continuous <Continuous>  dextrose 50% Injectable 25 Gram(s) IV Push once  dextrose 50% Injectable 12.5 Gram(s) IV Push once  dextrose 50% Injectable 25 Gram(s) IV Push once  furosemide    Tablet 20 milliGRAM(s) Oral daily  glucagon  Injectable 1 milliGRAM(s) IntraMuscular once  insulin glargine Injectable (LANTUS) 8 Unit(s) SubCutaneous at bedtime  insulin lispro (ADMELOG) corrective regimen sliding scale   SubCutaneous three times a day before meals  insulin lispro Injectable (ADMELOG) 2 Unit(s) SubCutaneous three times a day before meals  metoprolol succinate ER 25 milliGRAM(s) Oral daily  OXcarbazepine 300 milliGRAM(s) Oral two times a day  pantoprazole    Tablet 40 milliGRAM(s) Oral before breakfast  senna 2 Tablet(s) Oral at bedtime  sodium chloride 0.9%. 1000 milliLiter(s) (75 mL/Hr) IV Continuous <Continuous>  sodium chloride 0.9%. 1000 milliLiter(s) (100 mL/Hr) IV Continuous <Continuous>    MEDICATIONS  (PRN):  dextrose Oral Gel 15 Gram(s) Oral once PRN Blood Glucose LESS THAN 70 milliGRAM(s)/deciliter      PHYSICAL EXAM:  Constitutional: appears stated age, well developed/nourished, no acute distress  Eyes: EOM's intact.  PERRLA  ENMT: Normocephalic, atraumatic.  Clear oropharynx.  No ear discharge.  Neck: Jugular veins non-distended; no carotid bruits bilaterally.  Respiratory: respiratory pattern unlabored; no dullness to percussion; lungs clear to auscultation bilaterally.  Cardiovascular: Regular rhythm.  S1 and S2 normal.  No murmur nor rub appreciated.  Abdomen: Soft, non-tender.  Normal bowel sounds.  Extremities: extremities warm; no  edema.  Pulses: Intact bilaterally  Skin: No gross abnormalities noted.  Musculoskeletal: No gross deformities  Neurological: Alert, oriented x 3.  No focal neurologic deficits noted.

## 2025-02-27 NOTE — DISCHARGE NOTE PROVIDER - NSDCCPCAREPLAN_GEN_ALL_CORE_FT
PRINCIPAL DISCHARGE DIAGNOSIS  Diagnosis: NSTEMI (non-ST elevation myocardial infarction)  Assessment and Plan of Treatment: Activity:  - Do not drive or operate heavy machinery for 24 hours.  - Limit your physical or any strenuous activity for 2 weeks after angioplasty and 48 hours for angiogram. Support the groin site with your hand when you sneeze or cough. No heavy lifting ( objects more then 10 pounds).  - For wrist access, avoid using affected arm for 24 hours after removal of dressing and avoid heavy lifting for 7 days.  Hygiene:  - After 24 hours, you may shower and remove the dressing from the site. Do not tub bathe for one week. Do not rub or apply lotion, cream, powder to the affected site. Leave it open to air.   Diet:   - You may resume your diet. Low Sodium. Low Fat, Low Cholesterol.  If Diabetic - Carbohydrate Consistent Diet.      - Drink extra fluid unless otherwise advised.   Special Instructions:  - Bruising or black and blue at the puncture site is possible.  - If there is bleeding from the puncture site (groin or wrist) apply direct firm pressure on the site and call 911.  - Any sudden swelling, redness, fever, discharge or severe pain, call your physician or call the cath lab.   - If you notice any scab formation in the area avoid touching the site and allow it to heal.  - Numbness or "pins and needle" sensation in the affected arm, hand, leg or if the affected site become cool to touch or pale that persist for extended period of time call your physician immediately to be checked.  - If you developed chest pain, not relieved by your usual routine medication, fainting, lethargy, weakness, report to the nearest emergency room.   - Inform your Dentist or Surgeon if you are taking Aspirin or any antiplatelet medications. Report any bleeding in your urine or stool.   Medications:  - Soreness or tenderness at the site is possible it will diminish over time. You may take Tylenol every 4-6 hours as needed. Nothing stronger is needed.  - If you are diabetic and taking medication containing Metformin, do not take them for 48 hours after the procedure.   Any questions call Cardiac Cath Lab at [phone]

## 2025-02-27 NOTE — DISCHARGE NOTE PROVIDER - CARE PROVIDERS DIRECT ADDRESSES
[email or web address]

## 2025-02-27 NOTE — DISCHARGE NOTE PROVIDER - NSDCMRMEDTOKEN_GEN_ALL_CORE_FT
allopurinol 300 mg oral tablet: 1 tab(s) orally once a day  amLODIPine 2.5 mg oral tablet: 1 tab(s) orally once a day  apixaban 5 mg oral tablet: 2 tab(s) orally 2 times a day Take 2 tablets (10 mg) twice daily for 5 more days (7 days total). THEN, take 1 tablet (5 mg) twice daily for six months.  atorvastatin 80 mg oral tablet: 1 tab(s) orally once a day (at bedtime)  clopidogrel 75 mg oral tablet: 1 tab(s) orally once a day  furosemide 20 mg oral tablet: 1 tab(s) orally once a day  glimepiride 1 mg oral tablet: 1 tab(s) orally 2 times a day  metoprolol succinate 25 mg oral tablet, extended release: 1 tab(s) orally once a day  OXcarbazepine 300 mg oral tablet: 1 tab(s) orally 2 times a day  pantoprazole 40 mg oral delayed release tablet: 1 tab(s) orally once a day (before a meal)  Restasis MultiDose 0.05% ophthalmic emulsion: 1 drop(s) in each affected eye 2 times a day

## 2025-02-27 NOTE — DISCHARGE NOTE NURSING/CASE MANAGEMENT/SOCIAL WORK - PATIENT PORTAL LINK FT
You can access the FollowMyHealth Patient Portal offered by [facility] by registering at the following website: [email or web address]. By joining [employer] [facility]’s FollowMyHealth portal, you will also be able to view your health information using other applications (apps) compatible with our system.

## 2025-03-03 DIAGNOSIS — I26.99 OTHER PULMONARY EMBOLISM WITHOUT ACUTE COR PULMONALE: ICD-10-CM

## 2025-03-03 DIAGNOSIS — E11.36 TYPE 2 DIABETES MELLITUS WITH DIABETIC CATARACT: ICD-10-CM

## 2025-03-03 DIAGNOSIS — I12.9 HYPERTENSIVE CHRONIC KIDNEY DISEASE WITH STAGE 1 THROUGH STAGE 4 CHRONIC KIDNEY DISEASE, OR UNSPECIFIED CHRONIC KIDNEY DISEASE: ICD-10-CM

## 2025-03-03 DIAGNOSIS — Z79.02 LONG TERM (CURRENT) USE OF ANTITHROMBOTICS/ANTIPLATELETS: ICD-10-CM

## 2025-03-03 DIAGNOSIS — I21.A1 MYOCARDIAL INFARCTION TYPE 2: ICD-10-CM

## 2025-03-03 DIAGNOSIS — E11.22 TYPE 2 DIABETES MELLITUS WITH DIABETIC CHRONIC KIDNEY DISEASE: ICD-10-CM

## 2025-03-03 DIAGNOSIS — G47.33 OBSTRUCTIVE SLEEP APNEA (ADULT) (PEDIATRIC): ICD-10-CM

## 2025-03-03 DIAGNOSIS — Z95.5 PRESENCE OF CORONARY ANGIOPLASTY IMPLANT AND GRAFT: ICD-10-CM

## 2025-03-03 DIAGNOSIS — E11.42 TYPE 2 DIABETES MELLITUS WITH DIABETIC POLYNEUROPATHY: ICD-10-CM

## 2025-03-03 DIAGNOSIS — M10.9 GOUT, UNSPECIFIED: ICD-10-CM
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
## 2025-03-03 DIAGNOSIS — Z79.84 LONG TERM (CURRENT) USE OF ORAL HYPOGLYCEMIC DRUGS: ICD-10-CM

## 2025-03-03 DIAGNOSIS — T82.855A STENOSIS OF CORONARY ARTERY STENT, INITIAL ENCOUNTER: ICD-10-CM
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
## 2025-03-03 DIAGNOSIS — Z79.82 LONG TERM (CURRENT) USE OF ASPIRIN: ICD-10-CM

## 2025-03-03 DIAGNOSIS — I25.10 ATHEROSCLEROTIC HEART DISEASE OF NATIVE CORONARY ARTERY WITHOUT ANGINA PECTORIS: ICD-10-CM

## 2025-03-03 DIAGNOSIS — E66.9 OBESITY, UNSPECIFIED: ICD-10-CM

## 2025-03-26 ENCOUNTER — APPOINTMENT (OUTPATIENT)
Age: 85
End: 2025-03-26

## 2025-04-10 ENCOUNTER — OUTPATIENT (OUTPATIENT)
Dept: OUTPATIENT SERVICES | Facility: HOSPITAL | Age: 85
LOS: 1 days | End: 2025-04-10
Payer: MEDICARE

## 2025-04-10 VITALS
HEART RATE: 78 BPM | DIASTOLIC BLOOD PRESSURE: 68 MMHG | RESPIRATION RATE: 18 BRPM | TEMPERATURE: 98 F | HEIGHT: 68 IN | WEIGHT: 212.97 LBS | OXYGEN SATURATION: 96 % | SYSTOLIC BLOOD PRESSURE: 107 MMHG

## 2025-04-10 DIAGNOSIS — Z98.890 OTHER SPECIFIED POSTPROCEDURAL STATES: Chronic | ICD-10-CM

## 2025-04-10 DIAGNOSIS — I20.9 ANGINA PECTORIS, UNSPECIFIED: ICD-10-CM

## 2025-04-10 DIAGNOSIS — Z01.818 ENCOUNTER FOR OTHER PREPROCEDURAL EXAMINATION: ICD-10-CM

## 2025-04-10 DIAGNOSIS — Z90.89 ACQUIRED ABSENCE OF OTHER ORGANS: Chronic | ICD-10-CM

## 2025-04-10 DIAGNOSIS — Z95.5 PRESENCE OF CORONARY ANGIOPLASTY IMPLANT AND GRAFT: Chronic | ICD-10-CM

## 2025-04-10 LAB
ALBUMIN SERPL ELPH-MCNC: 4.3 G/DL — SIGNIFICANT CHANGE UP (ref 3.5–5.2)
ALP SERPL-CCNC: 126 U/L — HIGH (ref 30–115)
ALT FLD-CCNC: 27 U/L — SIGNIFICANT CHANGE UP (ref 0–41)
ANION GAP SERPL CALC-SCNC: 14 MMOL/L — SIGNIFICANT CHANGE UP (ref 7–14)
AST SERPL-CCNC: 22 U/L — SIGNIFICANT CHANGE UP (ref 0–41)
BASOPHILS # BLD AUTO: 0.09 K/UL — SIGNIFICANT CHANGE UP (ref 0–0.2)
BASOPHILS NFR BLD AUTO: 1.2 % — HIGH (ref 0–1)
BILIRUB SERPL-MCNC: 1.1 MG/DL — SIGNIFICANT CHANGE UP (ref 0.2–1.2)
BUN SERPL-MCNC: 26 MG/DL — HIGH (ref 10–20)
CALCIUM SERPL-MCNC: 9.1 MG/DL — SIGNIFICANT CHANGE UP (ref 8.4–10.5)
CHLORIDE SERPL-SCNC: 88 MMOL/L — LOW (ref 98–110)
CO2 SERPL-SCNC: 26 MMOL/L — SIGNIFICANT CHANGE UP (ref 17–32)
CREAT SERPL-MCNC: 1.8 MG/DL — HIGH (ref 0.7–1.5)
EGFR: 37 ML/MIN/1.73M2 — LOW
EGFR: 37 ML/MIN/1.73M2 — LOW
EOSINOPHIL # BLD AUTO: 0.18 K/UL — SIGNIFICANT CHANGE UP (ref 0–0.7)
EOSINOPHIL NFR BLD AUTO: 2.3 % — SIGNIFICANT CHANGE UP (ref 0–8)
GLUCOSE SERPL-MCNC: 712 MG/DL — CRITICAL HIGH (ref 70–99)
HCT VFR BLD CALC: 43.9 % — SIGNIFICANT CHANGE UP (ref 42–52)
HGB BLD-MCNC: 15.1 G/DL — SIGNIFICANT CHANGE UP (ref 14–18)
IMM GRANULOCYTES NFR BLD AUTO: 0.9 % — HIGH (ref 0.1–0.3)
LYMPHOCYTES # BLD AUTO: 1.23 K/UL — SIGNIFICANT CHANGE UP (ref 1.2–3.4)
LYMPHOCYTES # BLD AUTO: 15.9 % — LOW (ref 20.5–51.1)
MCHC RBC-ENTMCNC: 31.2 PG — HIGH (ref 27–31)
MCHC RBC-ENTMCNC: 34.4 G/DL — SIGNIFICANT CHANGE UP (ref 32–37)
MCV RBC AUTO: 90.7 FL — SIGNIFICANT CHANGE UP (ref 80–94)
MONOCYTES # BLD AUTO: 0.66 K/UL — HIGH (ref 0.1–0.6)
MONOCYTES NFR BLD AUTO: 8.5 % — SIGNIFICANT CHANGE UP (ref 1.7–9.3)
NEUTROPHILS # BLD AUTO: 5.53 K/UL — SIGNIFICANT CHANGE UP (ref 1.4–6.5)
NEUTROPHILS NFR BLD AUTO: 71.2 % — SIGNIFICANT CHANGE UP (ref 42.2–75.2)
NRBC BLD AUTO-RTO: 0 /100 WBCS — SIGNIFICANT CHANGE UP (ref 0–0)
PLATELET # BLD AUTO: 127 K/UL — LOW (ref 130–400)
PMV BLD: 13 FL — HIGH (ref 7.4–10.4)
POTASSIUM SERPL-MCNC: 4.8 MMOL/L — SIGNIFICANT CHANGE UP (ref 3.5–5)
POTASSIUM SERPL-SCNC: 4.8 MMOL/L — SIGNIFICANT CHANGE UP (ref 3.5–5)
PROT SERPL-MCNC: 6.7 G/DL — SIGNIFICANT CHANGE UP (ref 6–8)
RBC # BLD: 4.84 M/UL — SIGNIFICANT CHANGE UP (ref 4.7–6.1)
RBC # FLD: 13.4 % — SIGNIFICANT CHANGE UP (ref 11.5–14.5)
SODIUM SERPL-SCNC: 128 MMOL/L — LOW (ref 135–146)
WBC # BLD: 7.76 K/UL — SIGNIFICANT CHANGE UP (ref 4.8–10.8)
WBC # FLD AUTO: 7.76 K/UL — SIGNIFICANT CHANGE UP (ref 4.8–10.8)

## 2025-04-10 PROCEDURE — 80053 COMPREHEN METABOLIC PANEL: CPT

## 2025-04-10 PROCEDURE — 93005 ELECTROCARDIOGRAM TRACING: CPT

## 2025-04-10 PROCEDURE — 36415 COLL VENOUS BLD VENIPUNCTURE: CPT

## 2025-04-10 PROCEDURE — 99214 OFFICE O/P EST MOD 30 MIN: CPT | Mod: 25

## 2025-04-10 PROCEDURE — 93010 ELECTROCARDIOGRAM REPORT: CPT

## 2025-04-10 PROCEDURE — 85025 COMPLETE CBC W/AUTO DIFF WBC: CPT

## 2025-04-10 NOTE — H&P PST ADULT - HISTORY OF PRESENT ILLNESS
pt had stent placed several weeks ago  needs another stent     PATIENT/GUARDIAN CURRENTLY DENIES CHEST PAIN  SHORTNESS OF BREATH  PALPITATIONS,  DYSURIA, OR UPPER RESPIRATORY INFECTION IN PAST 2 WEEKS  denies travel outside the [place] in the past 30 days    Anesthesia Alert  NO--Difficult Airway  NO--History of neck surgery or radiation  NO--Limited ROM of neck  NO--History of Malignant hyperthermia  NO--No personal or family history of Pseudocholinesterase deficiency.  NO--Prior Anesthesia Complication  NO--Latex Allergy  NO--Loose teeth  NO--History of Rheumatoid Arthritis  NO--Bleeding risk  + GAURAV eliquis   NO--Other_____    PT/GUARDIAN DENIES ANY RASHES, ABRASION, OR OPEN WOUNDS OR CUTS    AS PER THE PT/GUARDIAN, THIS IS HIS/HER COMPLETE MEDICAL AND SURGICAL HX, INCLUDING MEDICATIONS PRESCRIBED AND OVER THE COUNTER    Patient/guardian understands the instructions and was given the opportunity to ask questions and have them answered.    pt/guardian denies any suicidal ideation or thoughts, pt states not a threat to self or others      D[facility] Activity Status Index (DASI)    18.95 points  The higher the score (maximum 58.2), the higher the functional status.  5.07 METs    Revised Cardiac Risk Index for Pre-Operative Risk  1 points  RCRI Score  6.0 %  Risk of major cardiac event

## 2025-04-10 NOTE — H&P PST ADULT - REASON FOR ADMISSION
Patient is a  year old  male presenting to PAST in preparation for   : LEFT HEART CATH on   4/17/25 under local  anesthesia by Dr. Mcmahon.

## 2025-04-10 NOTE — H&P PST ADULT - NSICDXPASTSURGICALHX_GEN_ALL_CORE_FT
PAST SURGICAL HISTORY:  H/O carpal tunnel repair right hand    H/O heart artery stent x1    History of tonsillectomy

## 2025-04-10 NOTE — H&P PST ADULT - NSICDXPASTMEDICALHX_GEN_ALL_CORE_FT
PAST MEDICAL HISTORY:  CAD (coronary artery disease)     Cataract     DM (diabetes mellitus)     Gout     HTN (hypertension)     GAURAV on CPAP     Seizure x1  2/2017

## 2025-04-11 ENCOUNTER — EMERGENCY (EMERGENCY)
Facility: HOSPITAL | Age: 85
LOS: 0 days | Discharge: HOME | End: 2025-04-11
Attending: STUDENT IN AN ORGANIZED HEALTH CARE EDUCATION/TRAINING PROGRAM
Payer: MEDICARE

## 2025-04-11 VITALS
DIASTOLIC BLOOD PRESSURE: 64 MMHG | TEMPERATURE: 98 F | HEART RATE: 83 BPM | WEIGHT: 212.97 LBS | SYSTOLIC BLOOD PRESSURE: 122 MMHG | RESPIRATION RATE: 20 BRPM | OXYGEN SATURATION: 98 % | HEIGHT: 68 IN

## 2025-04-11 DIAGNOSIS — M10.9 GOUT, UNSPECIFIED: ICD-10-CM

## 2025-04-11 DIAGNOSIS — Z98.890 OTHER SPECIFIED POSTPROCEDURAL STATES: Chronic | ICD-10-CM

## 2025-04-11 DIAGNOSIS — N39.0 URINARY TRACT INFECTION, SITE NOT SPECIFIED: ICD-10-CM

## 2025-04-11 DIAGNOSIS — E78.5 HYPERLIPIDEMIA, UNSPECIFIED: ICD-10-CM

## 2025-04-11 DIAGNOSIS — Z90.89 ACQUIRED ABSENCE OF OTHER ORGANS: Chronic | ICD-10-CM

## 2025-04-11 DIAGNOSIS — I20.9 ANGINA PECTORIS, UNSPECIFIED: ICD-10-CM

## 2025-04-11 DIAGNOSIS — Z01.818 ENCOUNTER FOR OTHER PREPROCEDURAL EXAMINATION: ICD-10-CM

## 2025-04-11 DIAGNOSIS — Z95.5 PRESENCE OF CORONARY ANGIOPLASTY IMPLANT AND GRAFT: Chronic | ICD-10-CM

## 2025-04-11 DIAGNOSIS — E11.65 TYPE 2 DIABETES MELLITUS WITH HYPERGLYCEMIA: ICD-10-CM

## 2025-04-11 DIAGNOSIS — I10 ESSENTIAL (PRIMARY) HYPERTENSION: ICD-10-CM

## 2025-04-11 LAB
ALBUMIN SERPL ELPH-MCNC: 4.2 G/DL — SIGNIFICANT CHANGE UP (ref 3.5–5.2)
ALP SERPL-CCNC: 120 U/L — HIGH (ref 30–115)
ALT FLD-CCNC: 32 U/L — SIGNIFICANT CHANGE UP (ref 0–41)
ANION GAP SERPL CALC-SCNC: 14 MMOL/L — SIGNIFICANT CHANGE UP (ref 7–14)
APPEARANCE UR: ABNORMAL
AST SERPL-CCNC: 63 U/L — HIGH (ref 0–41)
B-OH-BUTYR SERPL-SCNC: 0.8 MMOL/L — HIGH
BACTERIA # UR AUTO: ABNORMAL /HPF
BASE EXCESS BLDV CALC-SCNC: -15.9 MMOL/L — LOW (ref -2–3)
BASOPHILS # BLD AUTO: 0.09 K/UL — SIGNIFICANT CHANGE UP (ref 0–0.2)
BASOPHILS NFR BLD AUTO: 1.2 % — HIGH (ref 0–1)
BILIRUB SERPL-MCNC: 1.1 MG/DL — SIGNIFICANT CHANGE UP (ref 0.2–1.2)
BILIRUB UR-MCNC: NEGATIVE — SIGNIFICANT CHANGE UP
BUN SERPL-MCNC: 28 MG/DL — HIGH (ref 10–20)
CALCIUM SERPL-MCNC: 9.2 MG/DL — SIGNIFICANT CHANGE UP (ref 8.4–10.5)
CAST: 0 /LPF — SIGNIFICANT CHANGE UP (ref 0–4)
CHLORIDE SERPL-SCNC: 92 MMOL/L — LOW (ref 98–110)
CO2 SERPL-SCNC: 24 MMOL/L — SIGNIFICANT CHANGE UP (ref 17–32)
COLOR SPEC: YELLOW — SIGNIFICANT CHANGE UP
CREAT SERPL-MCNC: 1.7 MG/DL — HIGH (ref 0.7–1.5)
DIFF PNL FLD: ABNORMAL
EGFR: 40 ML/MIN/1.73M2 — LOW
EGFR: 40 ML/MIN/1.73M2 — LOW
EOSINOPHIL # BLD AUTO: 0.31 K/UL — SIGNIFICANT CHANGE UP (ref 0–0.7)
EOSINOPHIL NFR BLD AUTO: 4 % — SIGNIFICANT CHANGE UP (ref 0–8)
GAS PNL BLDV: <107 MMOL/L — CRITICAL LOW (ref 136–145)
GAS PNL BLDV: SIGNIFICANT CHANGE UP
GLUCOSE SERPL-MCNC: 430 MG/DL — HIGH (ref 70–99)
GLUCOSE UR QL: >=1000 MG/DL
HCO3 BLDV-SCNC: 15 MMOL/L — LOW (ref 22–29)
HCT VFR BLD CALC: 45.8 % — SIGNIFICANT CHANGE UP (ref 42–52)
HCT VFR BLDA CALC: 49 % — SIGNIFICANT CHANGE UP (ref 39–51)
HGB BLD CALC-MCNC: 16.3 G/DL — SIGNIFICANT CHANGE UP (ref 12.6–17.4)
HGB BLD-MCNC: 16 G/DL — SIGNIFICANT CHANGE UP (ref 14–18)
IMM GRANULOCYTES NFR BLD AUTO: 0.7 % — HIGH (ref 0.1–0.3)
KETONES UR-MCNC: ABNORMAL MG/DL
LACTATE BLDV-MCNC: 2.5 MMOL/L — HIGH (ref 0.5–2)
LEUKOCYTE ESTERASE UR-ACNC: ABNORMAL
LIDOCAIN IGE QN: 43 U/L — SIGNIFICANT CHANGE UP (ref 7–60)
LYMPHOCYTES # BLD AUTO: 1.2 K/UL — SIGNIFICANT CHANGE UP (ref 1.2–3.4)
LYMPHOCYTES # BLD AUTO: 15.6 % — LOW (ref 20.5–51.1)
MCHC RBC-ENTMCNC: 31.3 PG — HIGH (ref 27–31)
MCHC RBC-ENTMCNC: 34.9 G/DL — SIGNIFICANT CHANGE UP (ref 32–37)
MCV RBC AUTO: 89.6 FL — SIGNIFICANT CHANGE UP (ref 80–94)
MONOCYTES # BLD AUTO: 0.56 K/UL — SIGNIFICANT CHANGE UP (ref 0.1–0.6)
MONOCYTES NFR BLD AUTO: 7.3 % — SIGNIFICANT CHANGE UP (ref 1.7–9.3)
NEUTROPHILS # BLD AUTO: 5.48 K/UL — SIGNIFICANT CHANGE UP (ref 1.4–6.5)
NEUTROPHILS NFR BLD AUTO: 71.2 % — SIGNIFICANT CHANGE UP (ref 42.2–75.2)
NITRITE UR-MCNC: NEGATIVE — SIGNIFICANT CHANGE UP
NRBC BLD AUTO-RTO: 0 /100 WBCS — SIGNIFICANT CHANGE UP (ref 0–0)
PCO2 BLDV: 55 MMHG — SIGNIFICANT CHANGE UP (ref 42–55)
PH BLDV: 7.04 — CRITICAL LOW (ref 7.32–7.43)
PH UR: 5.5 — SIGNIFICANT CHANGE UP (ref 5–8)
PLATELET # BLD AUTO: 123 K/UL — LOW (ref 130–400)
PMV BLD: 12.4 FL — HIGH (ref 7.4–10.4)
PO2 BLDV: 45 MMHG — SIGNIFICANT CHANGE UP (ref 25–45)
POTASSIUM SERPL-MCNC: SIGNIFICANT CHANGE UP MMOL/L (ref 3.5–5)
POTASSIUM SERPL-SCNC: SIGNIFICANT CHANGE UP MMOL/L (ref 3.5–5)
PROT SERPL-MCNC: 7.3 G/DL — SIGNIFICANT CHANGE UP (ref 6–8)
PROT UR-MCNC: 30 MG/DL
RBC # BLD: 5.11 M/UL — SIGNIFICANT CHANGE UP (ref 4.7–6.1)
RBC # FLD: 13.3 % — SIGNIFICANT CHANGE UP (ref 11.5–14.5)
RBC CASTS # UR COMP ASSIST: 3 /HPF — SIGNIFICANT CHANGE UP (ref 0–4)
SAO2 % BLDV: 65.2 % — LOW (ref 67–88)
SODIUM SERPL-SCNC: 130 MMOL/L — LOW (ref 135–146)
SP GR SPEC: >1.03 — HIGH (ref 1–1.03)
SQUAMOUS # UR AUTO: 10 /HPF — HIGH (ref 0–5)
UROBILINOGEN FLD QL: 0.2 MG/DL — SIGNIFICANT CHANGE UP (ref 0.2–1)
WBC # BLD: 7.69 K/UL — SIGNIFICANT CHANGE UP (ref 4.8–10.8)
WBC # FLD AUTO: 7.69 K/UL — SIGNIFICANT CHANGE UP (ref 4.8–10.8)
WBC UR QL: 260 /HPF — HIGH (ref 0–5)
YEAST-LIKE CELLS: PRESENT

## 2025-04-11 PROCEDURE — 85014 HEMATOCRIT: CPT

## 2025-04-11 PROCEDURE — 93005 ELECTROCARDIOGRAM TRACING: CPT

## 2025-04-11 PROCEDURE — 81001 URINALYSIS AUTO W/SCOPE: CPT

## 2025-04-11 PROCEDURE — 87040 BLOOD CULTURE FOR BACTERIA: CPT

## 2025-04-11 PROCEDURE — 82330 ASSAY OF CALCIUM: CPT

## 2025-04-11 PROCEDURE — 85018 HEMOGLOBIN: CPT

## 2025-04-11 PROCEDURE — 84295 ASSAY OF SERUM SODIUM: CPT

## 2025-04-11 PROCEDURE — 82803 BLOOD GASES ANY COMBINATION: CPT

## 2025-04-11 PROCEDURE — 83690 ASSAY OF LIPASE: CPT

## 2025-04-11 PROCEDURE — 85025 COMPLETE CBC W/AUTO DIFF WBC: CPT

## 2025-04-11 PROCEDURE — 82010 KETONE BODYS QUAN: CPT

## 2025-04-11 PROCEDURE — 96374 THER/PROPH/DIAG INJ IV PUSH: CPT

## 2025-04-11 PROCEDURE — 36415 COLL VENOUS BLD VENIPUNCTURE: CPT

## 2025-04-11 PROCEDURE — 71045 X-RAY EXAM CHEST 1 VIEW: CPT | Mod: 26

## 2025-04-11 PROCEDURE — 71045 X-RAY EXAM CHEST 1 VIEW: CPT

## 2025-04-11 PROCEDURE — 83605 ASSAY OF LACTIC ACID: CPT

## 2025-04-11 PROCEDURE — 99285 EMERGENCY DEPT VISIT HI MDM: CPT | Mod: 25

## 2025-04-11 PROCEDURE — 93010 ELECTROCARDIOGRAM REPORT: CPT

## 2025-04-11 PROCEDURE — 80053 COMPREHEN METABOLIC PANEL: CPT

## 2025-04-11 PROCEDURE — 82962 GLUCOSE BLOOD TEST: CPT

## 2025-04-11 PROCEDURE — 84132 ASSAY OF SERUM POTASSIUM: CPT

## 2025-04-11 PROCEDURE — 99285 EMERGENCY DEPT VISIT HI MDM: CPT

## 2025-04-11 RX ORDER — SODIUM CHLORIDE 9 G/1000ML
1000 INJECTION, SOLUTION INTRAVENOUS ONCE
Refills: 0 | Status: COMPLETED | OUTPATIENT
Start: 2025-04-11 | End: 2025-04-11

## 2025-04-11 RX ORDER — CEFPODOXIME PROXETIL 200 MG/1
1 TABLET, FILM COATED ORAL
Qty: 10 | Refills: 0
Start: 2025-04-11 | End: 2025-04-20

## 2025-04-11 RX ORDER — POTASSIUM CHLORIDE, DEXTROSE MONOHYDRATE AND SODIUM CHLORIDE 150; 5; 900 MG/100ML; G/100ML; MG/100ML
1000 INJECTION, SOLUTION INTRAVENOUS
Refills: 0 | Status: DISCONTINUED | OUTPATIENT
Start: 2025-04-11 | End: 2025-04-11

## 2025-04-11 RX ADMIN — SODIUM CHLORIDE 1000 MILLILITER(S): 9 INJECTION, SOLUTION INTRAVENOUS at 12:31

## 2025-04-11 RX ADMIN — Medication 10 UNIT(S): at 13:52

## 2025-04-11 NOTE — ED PROVIDER NOTE - PATIENT PORTAL LINK FT
You can access the FollowMyHealth Patient Portal offered by [facility] by registering at the following website: [email or web address]. By joining [employer]’s FollowMyHealth portal, you will also be able to view your health information using other applications (apps) compatible with our system.

## 2025-04-11 NOTE — ED PROVIDER NOTE - OBJECTIVE STATEMENT
Patient is an 84-year-old male with past medical history of diabetes not on insulin, hypertension, hyperlipidemia, gout, presenting today with hyperglycemia.  Patient states that this morning he received a call from primary care informing that his blood sugar was in the 700.  Patient denies any syncope, nausea, vomiting, chest pain, shortness of breath, dysuria, hematuria, abdominal pain, headache, seizure, weakness.  Patient states that he needs a new glucometer, and cannot monitor his blood glucose at home.

## 2025-04-11 NOTE — ED ADULT NURSE NOTE - IN THE PAST 12 MONTHS HAVE YOU USED DRUGS OTHER THAN THOSE REQUIRED FOR MEDICAL REASON?
The patient's wife, Christal call back.  Stated Dr. Yani Hall (sp) will not see him.  I gave her the numbers to Dr. Schultz and also [facility].  Patient understood and will call.  Also, we do not have a copy of her ins. Card in our system and when I went to do a referral the [facility] website stated it was a PPO and no referral was necessary.    No

## 2025-04-11 NOTE — ED PROVIDER NOTE - PHYSICAL EXAMINATION
VITAL SIGNS: I have reviewed nursing notes and confirm.  CONSTITUTIONAL: Well-developed; well-nourished; in no acute distress.  SKIN: Skin exam is warm and dry, no acute rash.  HEAD: Normocephalic; atraumatic.  EYES: conjunctiva and sclera clear.  CARD: S1, S2 normal; no murmurs, gallops, or rubs. Regular rate and rhythm.  RESP: Normal respiratory effort, no tachypnea or distress. Lungs CTAB, no wheezes, rales or rhonchi.  EXT: Normal ROM. No clubbing, cyanosis or edema.  NEURO: Alert, oriented. Grossly unremarkable. No focal deficits.  PSYCH: Cooperative, appropriate.

## 2025-04-11 NOTE — ED PROVIDER NOTE - CLINICAL SUMMARY MEDICAL DECISION MAKING FREE TEXT BOX
pt with asymptomatic hyperglycemia, normal ketones, anion gap 14, non acidotic on repeat vbg x2    Independently interpretted by Tomás Mcgarry DO:  XR interpretation: No cardiopulmonary disease,   EKG interpretation: no BUBBA, NSR    Appropriate medications for patient's presenting complaints were ordered and effects were reassessed. Patient's external records were reviewed    Escalation to admission and/or observation was considered.  Patient feels much better and is comfortable with discharge.  Appropriate follow-up was arranged.    DC with abx

## 2025-04-11 NOTE — ED ADULT NURSE NOTE - NSFALLUNIVINTERV_ED_ALL_ED
Bed/Stretcher in lowest position, wheels locked, appropriate side rails in place/Call bell, personal items and telephone in reach/Instruct patient to call for assistance before getting out of bed/chair/stretcher/Non-slip footwear applied when patient is off stretcher/[place] to call system/Physically safe environment - no spills, clutter or unnecessary equipment/Purposeful proactive rounding/Room/bathroom lighting operational, light cord in reach

## 2025-04-11 NOTE — ED ADULT NURSE NOTE - OBJECTIVE STATEMENT
pt A/Ox4. states he received a call from someone telling him to come into the ER for a sugar over 700. pt is asymptomatic denying any pain or symptoms  pt states he was not able to take his morning medications due to having to come into the ER

## 2025-04-16 LAB
CULTURE RESULTS: SIGNIFICANT CHANGE UP
CULTURE RESULTS: SIGNIFICANT CHANGE UP
SPECIMEN SOURCE: SIGNIFICANT CHANGE UP
SPECIMEN SOURCE: SIGNIFICANT CHANGE UP

## 2025-05-01 ENCOUNTER — OUTPATIENT (OUTPATIENT)
Dept: OUTPATIENT SERVICES | Facility: HOSPITAL | Age: 85
LOS: 1 days | Discharge: HOME | End: 2025-05-01
Payer: MEDICARE

## 2025-05-01 ENCOUNTER — TRANSCRIPTION ENCOUNTER (OUTPATIENT)
Age: 85
End: 2025-05-01

## 2025-05-01 VITALS
DIASTOLIC BLOOD PRESSURE: 69 MMHG | HEART RATE: 70 BPM | SYSTOLIC BLOOD PRESSURE: 104 MMHG | RESPIRATION RATE: 18 BRPM | OXYGEN SATURATION: 99 %

## 2025-05-01 VITALS
DIASTOLIC BLOOD PRESSURE: 80 MMHG | TEMPERATURE: 98 F | SYSTOLIC BLOOD PRESSURE: 151 MMHG | HEART RATE: 54 BPM | RESPIRATION RATE: 16 BRPM

## 2025-05-01 DIAGNOSIS — Z98.890 OTHER SPECIFIED POSTPROCEDURAL STATES: Chronic | ICD-10-CM

## 2025-05-01 DIAGNOSIS — I20.9 ANGINA PECTORIS, UNSPECIFIED: ICD-10-CM

## 2025-05-01 DIAGNOSIS — Z90.89 ACQUIRED ABSENCE OF OTHER ORGANS: Chronic | ICD-10-CM

## 2025-05-01 DIAGNOSIS — Z95.5 PRESENCE OF CORONARY ANGIOPLASTY IMPLANT AND GRAFT: Chronic | ICD-10-CM

## 2025-05-01 LAB
ANION GAP SERPL CALC-SCNC: 10 MMOL/L — SIGNIFICANT CHANGE UP (ref 7–14)
ANION GAP SERPL CALC-SCNC: 11 MMOL/L — SIGNIFICANT CHANGE UP (ref 7–14)
BUN SERPL-MCNC: 14 MG/DL — SIGNIFICANT CHANGE UP (ref 10–20)
BUN SERPL-MCNC: 16 MG/DL — SIGNIFICANT CHANGE UP (ref 10–20)
CALCIUM SERPL-MCNC: 8.1 MG/DL — LOW (ref 8.4–10.5)
CALCIUM SERPL-MCNC: 8.9 MG/DL — SIGNIFICANT CHANGE UP (ref 8.4–10.5)
CHLORIDE SERPL-SCNC: 104 MMOL/L — SIGNIFICANT CHANGE UP (ref 98–110)
CHLORIDE SERPL-SCNC: 99 MMOL/L — SIGNIFICANT CHANGE UP (ref 98–110)
CO2 SERPL-SCNC: 24 MMOL/L — SIGNIFICANT CHANGE UP (ref 17–32)
CO2 SERPL-SCNC: 27 MMOL/L — SIGNIFICANT CHANGE UP (ref 17–32)
CREAT SERPL-MCNC: 1.1 MG/DL — SIGNIFICANT CHANGE UP (ref 0.7–1.5)
CREAT SERPL-MCNC: 1.2 MG/DL — SIGNIFICANT CHANGE UP (ref 0.7–1.5)
EGFR: 60 ML/MIN/1.73M2 — SIGNIFICANT CHANGE UP
EGFR: 60 ML/MIN/1.73M2 — SIGNIFICANT CHANGE UP
EGFR: 67 ML/MIN/1.73M2 — SIGNIFICANT CHANGE UP
EGFR: 67 ML/MIN/1.73M2 — SIGNIFICANT CHANGE UP
GLUCOSE BLDC GLUCOMTR-MCNC: 118 MG/DL — HIGH (ref 70–99)
GLUCOSE SERPL-MCNC: 118 MG/DL — HIGH (ref 70–99)
GLUCOSE SERPL-MCNC: 126 MG/DL — HIGH (ref 70–99)
HCT VFR BLD CALC: 39.5 % — LOW (ref 42–52)
HCT VFR BLD CALC: 40.3 % — LOW (ref 42–52)
HGB BLD-MCNC: 12.9 G/DL — LOW (ref 14–18)
HGB BLD-MCNC: 13.7 G/DL — LOW (ref 14–18)
MCHC RBC-ENTMCNC: 30.7 PG — SIGNIFICANT CHANGE UP (ref 27–31)
MCHC RBC-ENTMCNC: 31.4 PG — HIGH (ref 27–31)
MCHC RBC-ENTMCNC: 32.7 G/DL — SIGNIFICANT CHANGE UP (ref 32–37)
MCHC RBC-ENTMCNC: 34 G/DL — SIGNIFICANT CHANGE UP (ref 32–37)
MCV RBC AUTO: 92.4 FL — SIGNIFICANT CHANGE UP (ref 80–94)
MCV RBC AUTO: 94 FL — SIGNIFICANT CHANGE UP (ref 80–94)
NRBC BLD AUTO-RTO: 0 /100 WBCS — SIGNIFICANT CHANGE UP (ref 0–0)
NRBC BLD AUTO-RTO: 0 /100 WBCS — SIGNIFICANT CHANGE UP (ref 0–0)
PLATELET # BLD AUTO: 108 K/UL — LOW (ref 130–400)
PLATELET # BLD AUTO: 94 K/UL — LOW (ref 130–400)
PMV BLD: 11.2 FL — HIGH (ref 7.4–10.4)
PMV BLD: 11.7 FL — HIGH (ref 7.4–10.4)
POTASSIUM SERPL-MCNC: 3.1 MMOL/L — LOW (ref 3.5–5)
POTASSIUM SERPL-MCNC: 3.7 MMOL/L — SIGNIFICANT CHANGE UP (ref 3.5–5)
POTASSIUM SERPL-SCNC: 3.1 MMOL/L — LOW (ref 3.5–5)
POTASSIUM SERPL-SCNC: 3.7 MMOL/L — SIGNIFICANT CHANGE UP (ref 3.5–5)
RBC # BLD: 4.2 M/UL — LOW (ref 4.7–6.1)
RBC # BLD: 4.36 M/UL — LOW (ref 4.7–6.1)
RBC # FLD: 14.1 % — SIGNIFICANT CHANGE UP (ref 11.5–14.5)
RBC # FLD: 14.3 % — SIGNIFICANT CHANGE UP (ref 11.5–14.5)
SODIUM SERPL-SCNC: 137 MMOL/L — SIGNIFICANT CHANGE UP (ref 135–146)
SODIUM SERPL-SCNC: 138 MMOL/L — SIGNIFICANT CHANGE UP (ref 135–146)
WBC # BLD: 5.46 K/UL — SIGNIFICANT CHANGE UP (ref 4.8–10.8)
WBC # BLD: 5.48 K/UL — SIGNIFICANT CHANGE UP (ref 4.8–10.8)
WBC # FLD AUTO: 5.46 K/UL — SIGNIFICANT CHANGE UP (ref 4.8–10.8)
WBC # FLD AUTO: 5.48 K/UL — SIGNIFICANT CHANGE UP (ref 4.8–10.8)

## 2025-05-01 PROCEDURE — C1760: CPT

## 2025-05-01 PROCEDURE — C1725: CPT

## 2025-05-01 PROCEDURE — 85027 COMPLETE CBC AUTOMATED: CPT

## 2025-05-01 PROCEDURE — 36415 COLL VENOUS BLD VENIPUNCTURE: CPT

## 2025-05-01 PROCEDURE — C1769: CPT

## 2025-05-01 PROCEDURE — C1874: CPT

## 2025-05-01 PROCEDURE — C1887: CPT

## 2025-05-01 PROCEDURE — C1894: CPT

## 2025-05-01 PROCEDURE — C9600: CPT | Mod: LC

## 2025-05-01 PROCEDURE — 80048 BASIC METABOLIC PNL TOTAL CA: CPT

## 2025-05-01 PROCEDURE — 82962 GLUCOSE BLOOD TEST: CPT

## 2025-05-01 RX ORDER — FUROSEMIDE 10 MG/ML
30 INJECTION INTRAMUSCULAR; INTRAVENOUS
Refills: 0 | DISCHARGE

## 2025-05-01 RX ORDER — AMLODIPINE BESYLATE 10 MG/1
1 TABLET ORAL
Qty: 30 | Refills: 3
Start: 2025-05-01 | End: 2025-08-28

## 2025-05-01 RX ORDER — ASPIRIN 325 MG
324 TABLET ORAL ONCE
Refills: 0 | Status: COMPLETED | OUTPATIENT
Start: 2025-05-01 | End: 2025-05-01

## 2025-05-01 RX ORDER — FUROSEMIDE 10 MG/ML
1 INJECTION INTRAMUSCULAR; INTRAVENOUS
Refills: 0 | DISCHARGE

## 2025-05-01 RX ORDER — RANOLAZINE 1000 MG/1
1 TABLET, FILM COATED, EXTENDED RELEASE ORAL
Refills: 0 | DISCHARGE

## 2025-05-01 RX ORDER — CLOPIDOGREL BISULFATE 75 MG/1
300 TABLET, FILM COATED ORAL ONCE
Refills: 0 | Status: COMPLETED | OUTPATIENT
Start: 2025-05-01 | End: 2025-05-01

## 2025-05-01 RX ORDER — APIXABAN 2.5 MG/1
1 TABLET, FILM COATED ORAL
Qty: 60 | Refills: 3
Start: 2025-05-01 | End: 2025-08-28

## 2025-05-01 RX ADMIN — Medication 324 MILLIGRAM(S): at 07:58

## 2025-05-01 RX ADMIN — Medication 250 MILLILITER(S): at 07:58

## 2025-05-01 RX ADMIN — Medication 100 MILLILITER(S): at 10:00

## 2025-05-01 RX ADMIN — Medication 40 MILLIEQUIVALENT(S): at 15:14

## 2025-05-01 RX ADMIN — CLOPIDOGREL BISULFATE 300 MILLIGRAM(S): 75 TABLET, FILM COATED ORAL at 07:58

## 2025-05-01 NOTE — PROGRESS NOTE ADULT - SUBJECTIVE AND OBJECTIVE BOX
Cardiology Follow up s/p PCI     MICHELLERAHAT   85y Male  PAST MEDICAL & SURGICAL HISTORY:  GAURAV on CPAP  DM (diabetes mellitus)  Cataract  HTN (hypertension)  Seizure  x1  2017  Gout  CAD (coronary artery disease)  H/O heart artery stent  x1  H/O carpal tunnel repair  right hand  History of tonsillectomy           HPI:    Allergies    No Known Allergies    Intolerances      Patient seen and examined at bedside. No acute events overnight.  Patient without complaints. Pt ambulated without issues/symptoms  Denies CP, SOB, palpitations, or dizziness  No events on telemetry overnight    Vital Signs Last 24 Hrs  T(C): 36.4 (01 May 2025 07:07), Max: 36.4 (01 May 2025 07:07)  T(F): 97.5 (01 May 2025 07:07), Max: 97.5 (01 May 2025 07:07)  HR: 54 (01 May 2025 07:07) (54 - 54)  BP: 151/80 (01 May 2025 07:07) (151/80 - 151/80)  BP(mean): --  RR: 16 (01 May 2025 07:07) (16 - 16)  SpO2: --        MEDICATIONS  (STANDING):  chlorhexidine 4% Liquid 1 Application(s) Topical once  sodium chloride 0.9%. 1000 milliLiter(s) (125 mL/Hr) IV Continuous <Continuous>  sodium chloride 0.9%. 1000 milliLiter(s) (100 mL/Hr) IV Continuous <Continuous>    MEDICATIONS  (PRN):      REVIEW OF SYSTEMS:          All negative except as mentioned in HPI    PHYSICAL EXAM:           CONSTITUTIONAL: Well-developed; well-nourished; in no acute distress  	SKIN: warm, dry  	HEAD: Normocephalic; atraumatic  	EYES: PERRL.  	ENT: No nasal discharge, airway clear, mucous membranes moist  	NECK: Supple; non tender.  	CARD: +S1, +S2, no murmurs, gallops, or rubs. Regular rate and rhythm    	RESP: No wheezes, rales or rhonchi. CTA B/L  	ABD: soft ntnd, + BS x 4 quadrants  	EXT: moves all extremities,  no clubbing, cyanosis or edema  	NEURO: Alert and oriented x3, no focal deficits          PSYCH: Cooperative, appropriate          VASCULAR:  + Rad / + PTs / +  DPs          EXTREMITY:             Right Groin:  Dressing D/C/I,, access site soft, no hematoma, no pain, + pulses, no sign of infection, no numbness  	   Right Radial: Dressing D/C/I, dstat access site soft, no hematoma, no pain, + pulses, no sign of infection, no numbness            post pCI BMP:  post pCI CBC:  post PCI EKD ECHO:    LABS:                        13.7   5.48  )-----------( 108      ( 01 May 2025 07:05 )             40.3     05    137  |  99  |  16  ----------------------------<  126[H]  3.7   |  27  |  1.2    Ca    8.9      01 May 2025 07:05              A/P:  I discussed the case with Cardiologist Dr. Mcmahon  and recommend the following:    S/P PCI:    Intervention: Successful PCI of LPDA with balloon angioplasty s/p MEHDI x 2    Implants:   FRANCK FRONTIER RX [id number]  FRANCK FRONTIER RX [id number]    AUC:      FINDINGS:     Coronary Dominance: Left    LM: Mild disease.     LAD: Patent stent in proximal segment with mild ISR. Patent stent in mid segment. Severe diffuse disease distally not amenable to intervention.   D1: Mild disease.     CX: 40% lesion in proximal segment.  OM1: Mild disease.   OM2: Mild disease.   LPDA: 80% stenosis s/p PCI with MEHDI     RCA: Not injected. Known to be small non-dominant  from prior cath.    LVEDP: AV not crossed                        CBC/ECG at 1345                   NS  _100 cc/hr x _6 hr  	         Continue DAPT ( plavix 75mg pO daily ),   Lasix, CCB, Eliquis, Statin Therapy, B-Blocker, PPI                   NO ASA due to increased risk of bleeding.                        Patient given 30 day supply of ( Plavix 75 mg daily ) to take at home                     Patient agreeing to take DAPT for at least one year or as directed by cardiologist                    Pt given instructions on importance of taking antiplatelet medication or risk acute stent thrombosis/death                   Post cath instructions, access site care and activity restrictions reviewed with patient                     Discussed with patient to return to hospital if experience chest pain, shortness breath, dizziness and site bleeding                   Aggressive risk factor modification, diet counseling, smoking cessation discussed with patient                       Can discharge patient from cardiac standpoint at --  after ambulating without symptoms and access site wnl, ECG and blood work reviewed   Cardiac rehab information provided/ referral and communication for cardiac rehab completed                   Benefits of Cardiac Rehab discussed with patient,  Patient instructed to call and make first                               appointment after first f/u visit with Cardiologist                    Follow up with Cardiology DrGeorge   -----------       in  two weeks.  Instructed to call and make an appointment                      Discharge instructions as follows, when ready to d/c:                   - Continue medical regimen as prescribed to prevent chest pain                   - Continue dual anti-platelet therapy, beta blocker, statin                   - If you are diabetic and taking medication containing Metformin, do not take them for 48 hours after the procedure                   - Instructed to call 911 if chest pain, shortness of breath or bleeding from access site                   - No heavy lifting >10lbs x 1 week                   - No driving x 24 hours                   - No baths, swimming pools x 1 week, may shower                   - Low sodium low fat low cholesterol diet                   - Follow-up with Cardiologist in 1-2 weeks after discharge                                        Cardiology Follow up s/p PCI     MICHELLERAHAT   85y Male  PAST MEDICAL & SURGICAL HISTORY:  GAURAV on CPAP  DM (diabetes mellitus)  Cataract  HTN (hypertension)  Seizure  x1  2017  Gout  CAD (coronary artery disease)  H/O heart artery stent  x1  H/O carpal tunnel repair  right hand  History of tonsillectomy           HPI:    Allergies    No Known Allergies    Intolerances      Patient seen and examined at bedside. .  Patient without complaints. Pt ambulated without issues/symptoms  Denies CP, SOB, palpitations, or dizziness  No events on telemetry    Vital Signs Last 24 Hrs  T(C): 36.4 (01 May 2025 07:07), Max: 36.4 (01 May 2025 07:07)  T(F): 97.5 (01 May 2025 07:07), Max: 97.5 (01 May 2025 07:07)  HR: 54  BP: 115/68  BP(mean): --  RR: 16   SpO2: 99% room air         MEDICATIONS  (STANDING):  chlorhexidine 4% Liquid 1 Application(s) Topical once  sodium chloride 0.9%. 1000 milliLiter(s) (125 mL/Hr) IV Continuous <Continuous>  sodium chloride 0.9%. 1000 milliLiter(s) (100 mL/Hr) IV Continuous <Continuous>    MEDICATIONS  (PRN):      REVIEW OF SYSTEMS:          All negative except as mentioned in HPI    PHYSICAL EXAM:           CONSTITUTIONAL: Well-developed; well-nourished; in no acute distress  	SKIN: warm, dry  	HEAD: Normocephalic; atraumatic  	EYES: PERRL.  	ENT: No nasal discharge, airway clear, mucous membranes moist  	NECK: Supple; non tender.  	CARD: +S1, +S2, no murmurs, gallops, or rubs. Regular rate and rhythm    	RESP: No wheezes, rales or rhonchi. CTA B/L  	ABD: soft ntnd, + BS x 4 quadrants  	EXT: moves all extremities,  no clubbing, cyanosis or edema  	NEURO: Alert and oriented x3, no focal deficits          PSYCH: Cooperative, appropriate          VASCULAR:  + Rad / + PTs / +  DPs          EXTREMITY:             Right Groin:  Dressing D/C/I,, access site soft, no hematoma, no pain, + pulses, no sign of infection, no numbness  	   Right Radial: Dressing D/C/I, dstat access site soft, no hematoma, no pain, + pulses, no sign of infection, no numbness            post pCI BMP:     (pre 1.2)  post pCI CBC:    (pre 13.7)  post PCI EKD ECHO:    LABS:                        13.7   5.48  )-----------( 108      ( 01 May 2025 07:05 )             40.3     05    137  |  99  |  16  ----------------------------<  126[H]  3.7   |  27  |  1.2    Ca    8.9      01 May 2025 07:05              A/P:  I discussed the case with Cardiologist Dr. Mcmahon  and recommend the following:    S/P PCI:    Intervention: Successful PCI of LPDA with balloon angioplasty s/p MEHDI x 2    Implants:   FRANCK FRONTIER RX [id number]  FRANCK FRONTIER RX [id number]    AUC:      FINDINGS:     Coronary Dominance: Left    LM: Mild disease.     LAD: Patent stent in proximal segment with mild ISR. Patent stent in mid segment. Severe diffuse disease distally not amenable to intervention.   D1: Mild disease.     CX: 40% lesion in proximal segment.  OM1: Mild disease.   OM2: Mild disease.   LPDA: 80% stenosis s/p PCI with MEHDI     RCA: Not injected. Known to be small non-dominant  from prior cath.    LVEDP: AV not crossed                        CBC/ECG at 1345                   NS  _100 cc/hr x _6 hr  	   Continue DAPT ( plavix 75mg pO daily ),   Lasix, CCB, Eliquis, Statin Therapy, B-Blocker, PPI                   NO ASA due to increased risk of bleeding.     NO ACE/ARB, patient has soft bp, reassess on outpatient follow up if can tolerate                   Patient given 30 day supply of ( Plavix 75 mg daily ) to take at home                     Patient agreeing to take DAPT for at least one year or as directed by cardiologist                    Pt given instructions on importance of taking antiplatelet medication or risk acute stent thrombosis/death                   Post cath instructions, access site care and activity restrictions reviewed with patient                     Discussed with patient to return to hospital if experience chest pain, shortness breath, dizziness and site bleeding                   Aggressive risk factor modification, diet counseling, smoking cessation discussed with patient                       Can discharge patient from cardiac standpoint at 1545  after ambulating without symptoms and access site wnl, ECG and blood work reviewed   Cardiac rehab information provided/ referral and communication for cardiac rehab completed                   Benefits of Cardiac Rehab discussed with patient,  Patient instructed to call and make first                               appointment after first f/u visit with Cardiologist                    Follow up with Cardiology Dr. Mcmahon   in  two weeks.  Instructed to call and make an appointment                      Discharge instructions as follows, when ready to d/c:                   - Continue medical regimen as prescribed to prevent chest pain                   - Continue dual anti-platelet therapy, beta blocker, statin, lasix                    - If you are diabetic and taking medication containing Metformin, do not take them for 48 hours after the procedure                   - Instructed to call 911 if chest pain, shortness of breath or bleeding from access site                   - No heavy lifting >10lbs x 1 week                   - No driving x 24 hours                   - No baths, swimming pools x 1 week, may shower                   - Low sodium low fat low cholesterol diet                   - Follow-up with Cardiologist in 2 weeks after discharge                                        Cardiology Follow up s/p PCI     MICHELLERAHAT   85y Male  PAST MEDICAL & SURGICAL HISTORY:  GAURAV on CPAP  DM (diabetes mellitus)  Cataract  HTN (hypertension)  Seizure  x1  2/2017  Gout  CAD (coronary artery disease)  H/O heart artery stent  x1  H/O carpal tunnel repair  right hand  History of tonsillectomy           HPI:    Allergies    No Known Allergies    Intolerances      Patient seen and examined at bedside. .  Patient without complaints. Pt ambulated without issues/symptoms  Denies CP, SOB, palpitations, or dizziness  No events on telemetry    Vital Signs Last 24 Hrs  T(C): 36.4 (01 May 2025 07:07), Max: 36.4 (01 May 2025 07:07)  T(F): 97.5 (01 May 2025 07:07), Max: 97.5 (01 May 2025 07:07)  HR: 54  BP: 115/68  BP(mean): --  RR: 16   SpO2: 99% room air         MEDICATIONS  (STANDING):  chlorhexidine 4% Liquid 1 Application(s) Topical once  sodium chloride 0.9%. 1000 milliLiter(s) (125 mL/Hr) IV Continuous <Continuous>  sodium chloride 0.9%. 1000 milliLiter(s) (100 mL/Hr) IV Continuous <Continuous>    MEDICATIONS  (PRN):      REVIEW OF SYSTEMS:          All negative except as mentioned in HPI    PHYSICAL EXAM:           CONSTITUTIONAL: Well-developed; well-nourished; in no acute distress  	SKIN: warm, dry  	HEAD: Normocephalic; atraumatic  	EYES: PERRL.  	ENT: No nasal discharge, airway clear, mucous membranes moist  	NECK: Supple; non tender.  	CARD: +S1, +S2, no murmurs, gallops, or rubs. Regular rate and rhythm    	RESP: No wheezes, rales or rhonchi. CTA B/L  	ABD: soft ntnd, + BS x 4 quadrants  	EXT: moves all extremities,  no clubbing, cyanosis or edema  	NEURO: Alert and oriented x3, no focal deficits          PSYCH: Cooperative, appropriate          VASCULAR:  + Rad / + PTs / +  DPs          EXTREMITY:             Right Groin:  Dressing D/C/I,, access site soft, no hematoma, no pain, + pulses, no sign of infection, no numbness  	   Right Radial: Dressing D/C/I, dstat access site soft, no hematoma, no pain, + pulses, no sign of infection, no numbness            post pCI BMP: 1.1    (pre 1.2), K level resulted 3.1, most likely dilutional, give 40meq K PO prior to d/c  post pCI CBC: 12.9   (pre 13.7)  post PCI EKG: no acute changes                                                                                                                2D ECHO:    LABS:                        13.7   5.48  )-----------( 108      ( 01 May 2025 07:05 )             40.3     05-01    137  |  99  |  16  ----------------------------<  126[H]  3.7   |  27  |  1.2    Ca    8.9      01 May 2025 07:05              A/P:  I discussed the case with Cardiologist Dr. Mcmahon  and recommend the following:    S/P PCI:    Intervention: Successful PCI of LPDA with balloon angioplasty s/p MEHDI x 2    Implants:   FRANCK FRONTIER RX [id number]  FRANCK FRONTIER RX [id number]    AUC:      FINDINGS:     Coronary Dominance: Left    LM: Mild disease.     LAD: Patent stent in proximal segment with mild ISR. Patent stent in mid segment. Severe diffuse disease distally not amenable to intervention.   D1: Mild disease.     CX: 40% lesion in proximal segment.  OM1: Mild disease.   OM2: Mild disease.   LPDA: 80% stenosis s/p PCI with MEHDI     RCA: Not injected. Known to be small non-dominant  from prior cath.    LVEDP: AV not crossed                        CBC/ECG at 1345                   NS  _100 cc/hr x _6 hr  	   Continue DAPT ( plavix 75mg pO daily ),   Lasix, CCB, Eliquis, Statin Therapy, B-Blocker, PPI                   NO ASA due to increased risk of bleeding.     NO ACE/ARB, patient has soft bp, reassess on outpatient follow up if can tolerate                   Patient given 30 day supply of ( Plavix 75 mg daily ) to take at home                     Patient agreeing to take DAPT for at least one year or as directed by cardiologist                    Pt given instructions on importance of taking antiplatelet medication or risk acute stent thrombosis/death                   Post cath instructions, access site care and activity restrictions reviewed with patient                     Discussed with patient to return to hospital if experience chest pain, shortness breath, dizziness and site bleeding                   Aggressive risk factor modification, diet counseling, smoking cessation discussed with patient                       Can discharge patient from cardiac standpoint at 1545  after ambulating without symptoms and access site wnl, ECG and blood work reviewed   Cardiac rehab information provided/ referral and communication for cardiac rehab completed                   Benefits of Cardiac Rehab discussed with patient,  Patient instructed to call and make first                               appointment after first f/u visit with Cardiologist                    Follow up with Cardiology Dr. Mcmahon   in  two weeks.  Instructed to call and make an appointment                      Discharge instructions as follows, when ready to d/c:                   - Continue medical regimen as prescribed to prevent chest pain                   - Continue dual anti-platelet therapy, beta blocker, statin, lasix                    - If you are diabetic and taking medication containing Metformin, do not take them for 48 hours after the procedure                   - Instructed to call 911 if chest pain, shortness of breath or bleeding from access site                   - No heavy lifting >10lbs x 1 week                   - No driving x 24 hours                   - No baths, swimming pools x 1 week, may shower                   - Low sodium low fat low cholesterol diet                   - Follow-up with Cardiologist in 2 weeks after discharge

## 2025-05-01 NOTE — ASU DISCHARGE PLAN (ADULT/PEDIATRIC) - CARE PROVIDER_API CALL
Alan Mcmahon  Cardiology  [address], [address]  [place] [address]  Phone: [phone]  Fax: [phone]  Follow Up Time: 2 weeks

## 2025-05-01 NOTE — ASU DISCHARGE PLAN (ADULT/PEDIATRIC) - ASU DC SPECIAL INSTRUCTIONSFT
Activity:  - Do not drive or operate heavy machinery for 24 hours.  - Limit your physical or any strenuous activity for 2 weeks after angioplasty and 48 hours for angiogram. Support the groin site with your hand when you sneeze or cough. No heavy lifting ( objects more then 10 pounds).  - For wrist access, avoid using affected arm for 24 hours after removal of dressing and avoid heavy lifting for 7 days.  Hygiene:  - After 24 hours, you may shower and remove the dressing from the site. Do not tub bathe for one week. Do not rub or apply lotion, cream, powder to the affected site. Leave it open to air.   Diet:   - You may resume your diet. Low Sodium. Low Fat, Low Cholesterol.  If Diabetic - Carbohydrate Consistent Diet.      - Drink extra fluid unless otherwise advised.   Special Instructions:  - Bruising or black and blue at the puncture site is possible.  - If there is bleeding from the puncture site (groin or wrist) apply direct firm pressure on the site and call 911.  - Any sudden swelling, redness, fever, discharge or severe pain, call your physician or call the cath lab.   - If you notice any scab formation in the area avoid touching the site and allow it to heal.  - Numbness or "pins and needle" sensation in the affected arm, hand, leg or if the affected site become cool to touch or pale that persist for extended period of time call your physician immediately to be checked.  - If you developed chest pain, not relieved by your usual routine medication, fainting, lethargy, weakness, report to the nearest emergency room.   - Inform your Dentist or Surgeon if you are taking Aspirin or any antiplatelet medications. Report any bleeding in your urine or stool.   Medications:  - Soreness or tenderness at the site is possible it will diminish over time. You may take Tylenol every 4-6 hours as needed. Nothing stronger is needed.  - If you are diabetic and taking medication containing Metformin, do not take them for 48 hours after the procedure.     Any questions call Cardiac Cath Lab at [phone] or [phone], Monday - Friday from 7 - 9 pm.

## 2025-05-01 NOTE — ASU DISCHARGE PLAN (ADULT/PEDIATRIC) - FINANCIAL ASSISTANCE
[facility] provides services at a reduced cost to those who are determined to be eligible through [facility]’s financial assistance program. Information regarding [facility]’s financial assistance program can be found by going to [email or web address] or by calling [phone].

## 2025-05-01 NOTE — CHART NOTE - NSCHARTNOTEFT_GEN_A_CORE
INTERVENTIONAL CARDIOLOGY:                                               PREOPERATIVE DAY OF PROCEDURE EVALUATION:  I have personally seen and examined the patient.  I agree with the history and physical which I have reviewed and noted any changes below.         HPI:  84-year-old-year-old male history of diabetes 2, hypertension, hyperlipidemia, PE (on Eliquis last dose Tues, 4/29 am) CKD, CAD, NSTEMI 2/25/25 s/p LAD MEHDI x 1, here for staged PCI  RCA       CTA:  Pulmonary embolus present in anterior segment of the right upper lobe (series 4 image 87).  Right heart is relatively larger than the left. Given the small clot burden, it is doubtful that this reflects acute right heart strain and may reflect chronic changes. Echo for evaluation of right heart strain is suggested.    Patient admitted to medicine for PE and suspected NSTEMI 2/25      There is 2-vessel coronary artery disease (LAD 70 % and  %).      Procedure Narrative:   The risks and alternatives of the procedures and conscious sedation  were explained to the patient and informed consent was  obtained. The patient was brought to the cath lab and placed on the  exam table.  Access   Right femoral artery:   The puncture site was infiltrated with 1% Lidocaine. Vascular access  was obtained using modified seldinger technique.  Hemostasis/Sheath Status: Hemostasis was successful using mechanical  compression (PERCLOSE PROGLIDE 6FR).    Coronary Angiography   Left Coronary System:     Patient: RAHAT MARTINEZ         MRN: [id number]  Study Date: 02/25/2025   05:36 PM  Page 1 of 4          A  JL3.5 [id number] 5/BX was positioned into the vessel ostia under  fluoroscopic guidance. Right Coronary System:  A JR4 [id number] was positioned into the vessel ostia under  fluoroscopic guidance.  Diagnostic Findings:     Coronary Angiography   The coronary circulation is left dominant.      LM   Left main artery: Angiography shows mild atherosclerosis.      LAD   Left anterior descending artery: The segment is large. There is a 70 %  stenosis in the middle third portion of the segment. An  intervention was performed. The previously placed stent is a  drug-eluting stent. There is in-stent restenosis.    CX   Circumflex: The segment is large, dominant. Angiography shows moderate  atherosclerosis. RYANNE Flow 2.    RCA   Right coronary artery: The segment is small, non-dominant. The distal  vessel is supplied by moderate collaterals from the  Circumflex. There is a total occlusion stenosis in the proximal third  portion of the segment.        Pre cath note:  indication:  [ ] STEMI                [ ] NSTEMI                 [ ] Acute coronary syndrome                   [ ]Unstable Angina   [ ] high risk  [ ] intermediate risk  [ ] low risk                   [ x] Staged PCI RCA    non-invasive testing:                          Date:                     result: [ ] high risk  [ ] intermediate risk  [ ] low risk    Anti- Anginal medications:                    [ ] not used d/t                     [x ] used   ( x) BB     (x ) CCB      ( ) Nitrate   (  ) Ranexa          [ ] not used but strong indication not to use    Ejection Fraction                   [ ] <29            [ ] 30-39%   [ ] 40-49%     [ x]>50%    CHF          [ ] active (within last 14 days on meds   [ ] Chronic (on meds but no exacerbation)  NYHA Functional Class:  (  ) Class I (no limitations)  (  ) Class II (slight limitation)  (  ) Class III (marked limitation)  (  ) Class IV (symptoms at rest)    COPD                   [ ] mild (on chronic bronchodilators)  [ ] moderate (on chronic steroid therapy)      [ ] severe (indication for home O2 or PACO2 >50)    Other risk factors:                     [ x] Previous MI                     [ ] CVA/ stroke                    [ ] carotid stent/ CEA                    [ ] PVD/PAD- (arterial aneurysm, non-palpable pulses, tortuous vessel with inability to insert catheter, infra-renal dissection, renal or subclavian artery stenosis)                    [ ] previous CABG                    [ ] Renal Failure:  on HD  (  ) yes  (  ) no                    [ x] Diabetic  (  ) Type 1  ( x ) Type 2                                         (  ) Insulin dependent  (  ) non-insulin dependent                                         (  ) Metformin  (  ) Januvia  (  x) Glimepiride  (  ) Glipizide  (  ) Glyburide  (  ) Actos                                         (  ) GLP-1 receptor agonists (Ozempic, Wegovy, Zepbound, Mounjaro, Trulicity, Byetta, Victoza)                                         (  ) SGLT2 Inhibitors (Farxiga, Jardiance, Invokana)                                         (  ) Other                Bleeding Risk: 1.6%    Pre-cath Hydration: (  x)  cc IV bolus x 1 over 1 hr followed by: 125cc/hr until cath     (  ) NS @ 75cc/hr until procedure (up to 2 hrs) if EF> 50%                                                                                                                             (  ) NS @ 50cc/hr until procedure (up to 2 hrs) if EF< 50%                                        (  ) No precath hydration d/t      RIGHT RADIAL ARTERY EVALUATION:  SUNNI TEST: [] Negative          [x] suboptimal;    EF: 60%  Date: 2/2025    EKG:   Date:       (Signed electronically by __________)  05-01-25 @ 06:59 INTERVENTIONAL CARDIOLOGY:                                               PREOPERATIVE DAY OF PROCEDURE EVALUATION:  I have personally seen and examined the patient.  I agree with the history and physical which I have reviewed and noted any changes below.         HPI:  84-year-old-year-old male history of diabetes 2, hypertension, hyperlipidemia, PE (on Eliquis last dose Tues, 4/29 am) CKD, CAD, NSTEMI 2/25/25 s/p LAD MEHDI x 1, here for staged PCI       CTA:  Pulmonary embolus present in anterior segment of the right upper lobe (series 4 image 87).  Right heart is relatively larger than the left. Given the small clot burden, it is doubtful that this reflects acute right heart strain and may reflect chronic changes. Echo for evaluation of right heart strain is suggested.    Patient admitted to medicine for PE and suspected NSTEMI 2/25      There is 2-vessel coronary artery disease (LAD 70 % and  %).      Procedure Narrative:   The risks and alternatives of the procedures and conscious sedation  were explained to the patient and informed consent was  obtained. The patient was brought to the cath lab and placed on the  exam table.  Access   Right femoral artery:   The puncture site was infiltrated with 1% Lidocaine. Vascular access  was obtained using modified seldinger technique.  Hemostasis/Sheath Status: Hemostasis was successful using mechanical  compression (PERCLOSE PROGLIDE 6FR).    Coronary Angiography   Left Coronary System:     Patient: RAHAT MARTINEZ         MRN: [id number]  Study Date: 02/25/2025   05:36 PM  Page 1 of 4          A  JL3.5 [id number] 5/BX was positioned into the vessel ostia under  fluoroscopic guidance. Right Coronary System:  A JR4 [id number] was positioned into the vessel ostia under  fluoroscopic guidance.  Diagnostic Findings:     Coronary Angiography   The coronary circulation is left dominant.      LM   Left main artery: Angiography shows mild atherosclerosis.      LAD   Left anterior descending artery: The segment is large. There is a 70 %  stenosis in the middle third portion of the segment. An  intervention was performed. The previously placed stent is a  drug-eluting stent. There is in-stent restenosis.    CX   Circumflex: The segment is large, dominant. Angiography shows moderate  atherosclerosis. RYANNE Flow 2.    RCA   Right coronary artery: The segment is small, non-dominant. The distal  vessel is supplied by moderate collaterals from the  Circumflex. There is a total occlusion stenosis in the proximal third  portion of the segment.        Pre cath note:  indication:  [ ] STEMI                [ ] NSTEMI                 [ ] Acute coronary syndrome                   [ ]Unstable Angina   [ ] high risk  [ ] intermediate risk  [ ] low risk                   [ x] Staged PCI  non-invasive testing:                          Date:                     result: [ ] high risk  [ ] intermediate risk  [ ] low risk    Anti- Anginal medications:                    [ ] not used d/t                     [x ] used   ( x) BB     (x ) CCB      ( ) Nitrate   (  ) Ranexa          [ ] not used but strong indication not to use    Ejection Fraction                   [ ] <29            [ ] 30-39%   [ ] 40-49%     [ x]>50%    CHF          [ ] active (within last 14 days on meds   [ ] Chronic (on meds but no exacerbation)  NYHA Functional Class:  (  ) Class I (no limitations)  (  ) Class II (slight limitation)  (  ) Class III (marked limitation)  (  ) Class IV (symptoms at rest)    COPD                   [ ] mild (on chronic bronchodilators)  [ ] moderate (on chronic steroid therapy)      [ ] severe (indication for home O2 or PACO2 >50)    Other risk factors:                     [ x] Previous MI                     [ ] CVA/ stroke                    [ ] carotid stent/ CEA                    [ ] PVD/PAD- (arterial aneurysm, non-palpable pulses, tortuous vessel with inability to insert catheter, infra-renal dissection, renal or subclavian artery stenosis)                    [ ] previous CABG                    [ ] Renal Failure:  on HD  (  ) yes  (  ) no                    [ x] Diabetic  (  ) Type 1  ( x ) Type 2                                         (  ) Insulin dependent  (  ) non-insulin dependent                                         (  ) Metformin  (  ) Januvia  (  x) Glimepiride  (  ) Glipizide  (  ) Glyburide  (  ) Actos                                         (  ) GLP-1 receptor agonists (Ozempic, Wegovy, Zepbound, Mounjaro, Trulicity, Byetta, Victoza)                                         (  ) SGLT2 Inhibitors (Farxiga, Jardiance, Invokana)                                         (  ) Other                Bleeding Risk: 1.6%    Pre-cath Hydration: (  x)  cc IV bolus x 1 over 1 hr followed by: 125cc/hr until cath     (  ) NS @ 75cc/hr until procedure (up to 2 hrs) if EF> 50%                                                                                                                             (  ) NS @ 50cc/hr until procedure (up to 2 hrs) if EF< 50%                                        (  ) No precath hydration d/t      RIGHT RADIAL ARTERY EVALUATION:  SUNNI TEST: [] Negative          [x] suboptimal;    EF: 60%  Date: 2/2025    EKG: /PAC's  Date: 4/11/25       (Signed electronically by __________)  05-01-25 @ 06:59 INTERVENTIONAL CARDIOLOGY:                                               PREOPERATIVE DAY OF PROCEDURE EVALUATION:  I have personally seen and examined the patient.  I agree with the history and physical which I have reviewed and noted any changes below.         HPI:  84-year-old-year-old male history of diabetes 2, hypertension, hyperlipidemia, PE (on Eliquis last dose Tues, 4/29 am) CKD, CAD, NSTEMI 2/25/25 s/p LAD MEHDI x 1, here for staged PCI LPDA      CTA:  Pulmonary embolus present in anterior segment of the right upper lobe (series 4 image 87).  Right heart is relatively larger than the left. Given the small clot burden, it is doubtful that this reflects acute right heart strain and may reflect chronic changes. Echo for evaluation of right heart strain is suggested.    Patient admitted to medicine for PE and suspected NSTEMI 2/25      There is 2-vessel coronary artery disease (LAD 70 % and  %).      Procedure Narrative:   The risks and alternatives of the procedures and conscious sedation  were explained to the patient and informed consent was  obtained. The patient was brought to the cath lab and placed on the  exam table.  Access   Right femoral artery:   The puncture site was infiltrated with 1% Lidocaine. Vascular access  was obtained using modified seldinger technique.  Hemostasis/Sheath Status: Hemostasis was successful using mechanical  compression (PERCLOSE PROGLIDE 6FR).    Coronary Angiography   Left Coronary System:     Patient: RAHAT MARTINEZ         MRN: [id number]  Study Date: 02/25/2025   05:36 PM  Page 1 of 4          A  JL3.5 [id number] 5/BX was positioned into the vessel ostia under  fluoroscopic guidance. Right Coronary System:  A JR4 [id number] was positioned into the vessel ostia under  fluoroscopic guidance.  Diagnostic Findings:     Coronary Angiography   The coronary circulation is left dominant.      LM   Left main artery: Angiography shows mild atherosclerosis.      LAD   Left anterior descending artery: The segment is large. There is a 70 %  stenosis in the middle third portion of the segment. An  intervention was performed. The previously placed stent is a  drug-eluting stent. There is in-stent restenosis.    CX   Circumflex: The segment is large, dominant. Angiography shows moderate  atherosclerosis. RYANNE Flow 2.    RCA   Right coronary artery: The segment is small, non-dominant. The distal  vessel is supplied by moderate collaterals from the  Circumflex. There is a total occlusion stenosis in the proximal third  portion of the segment.        Pre cath note:  indication:  [ ] STEMI                [ ] NSTEMI                 [ ] Acute coronary syndrome                   [ ]Unstable Angina   [ ] high risk  [ ] intermediate risk  [ ] low risk                   [ x] Staged PCI LPDA   non-invasive testing:                          Date:                     result: [ ] high risk  [ ] intermediate risk  [ ] low risk    Anti- Anginal medications:                    [ ] not used d/t                     [x ] used   ( x) BB     (x ) CCB      ( ) Nitrate   (  ) Ranexa          [ ] not used but strong indication not to use    Ejection Fraction                   [ ] <29            [ ] 30-39%   [ ] 40-49%     [ x]>50%    CHF          [ ] active (within last 14 days on meds   [ ] Chronic (on meds but no exacerbation)  NYHA Functional Class:  (  ) Class I (no limitations)  (  ) Class II (slight limitation)  (  ) Class III (marked limitation)  (  ) Class IV (symptoms at rest)    COPD                   [ ] mild (on chronic bronchodilators)  [ ] moderate (on chronic steroid therapy)      [ ] severe (indication for home O2 or PACO2 >50)    Other risk factors:                     [ x] Previous MI                     [ ] CVA/ stroke                    [ ] carotid stent/ CEA                    [ ] PVD/PAD- (arterial aneurysm, non-palpable pulses, tortuous vessel with inability to insert catheter, infra-renal dissection, renal or subclavian artery stenosis)                    [ ] previous CABG                    [ ] Renal Failure:  on HD  (  ) yes  (  ) no                    [ x] Diabetic  (  ) Type 1  ( x ) Type 2                                         (  ) Insulin dependent  (  ) non-insulin dependent                                         (  ) Metformin  (  ) Januvia  (  x) Glimepiride  (  ) Glipizide  (  ) Glyburide  (  ) Actos                                         (  ) GLP-1 receptor agonists (Ozempic, Wegovy, Zepbound, Mounjaro, Trulicity, Byetta, Victoza)                                         (  ) SGLT2 Inhibitors (Farxiga, Jardiance, Invokana)                                         (  ) Other                Bleeding Risk: 1.6%    Pre-cath Hydration: (  x)  cc IV bolus x 1 over 1 hr followed by: 125cc/hr until cath     (  ) NS @ 75cc/hr until procedure (up to 2 hrs) if EF> 50%                                                                                                                             (  ) NS @ 50cc/hr until procedure (up to 2 hrs) if EF< 50%                                        (  ) No precath hydration d/t      RIGHT RADIAL ARTERY EVALUATION:  SUNNI TEST: [] Negative          [x] suboptimal;    EF: 60%  Date: 2/2025    EKG: SR/PAC's  Date: 4/11/25       (Signed electronically by __________)  05-01-25 @ 06:59 INTERVENTIONAL CARDIOLOGY:                                               PREOPERATIVE DAY OF PROCEDURE EVALUATION:  I have personally seen and examined the patient.  I agree with the history and physical which I have reviewed and noted any changes below.         HPI:  84-year-old-year-old male history of diabetes 2, hypertension, hyperlipidemia, PE (on Eliquis last dose Tues, 4/29 am) CKD, CAD, NSTEMI 2/25/25 s/p LAD MEHDI x 1, here for staged PCI LPDA      CTA:  Pulmonary embolus present in anterior segment of the right upper lobe (series 4 image 87).  Right heart is relatively larger than the left. Given the small clot burden, it is doubtful that this reflects acute right heart strain and may reflect chronic changes. Echo for evaluation of right heart strain is suggested.    Patient admitted to medicine for PE and suspected NSTEMI 2/25      There is 2-vessel coronary artery disease (LAD 70 % and  %).      Procedure Narrative:   The risks and alternatives of the procedures and conscious sedation  were explained to the patient and informed consent was  obtained. The patient was brought to the cath lab and placed on the  exam table.  Access   Right femoral artery:   The puncture site was infiltrated with 1% Lidocaine. Vascular access  was obtained using modified seldinger technique.  Hemostasis/Sheath Status: Hemostasis was successful using mechanical  compression (PERCLOSE PROGLIDE 6FR).    Coronary Angiography   Left Coronary System:     Patient: RAHAT MARTINEZ         MRN: [id number]  Study Date: 02/25/2025   05:36 PM  Page 1 of 4          A  JL3.5 [id number] 5/BX was positioned into the vessel ostia under  fluoroscopic guidance. Right Coronary System:  A JR4 [id number] was positioned into the vessel ostia under  fluoroscopic guidance.  Diagnostic Findings:     Coronary Angiography   The coronary circulation is left dominant.      LM   Left main artery: Angiography shows mild atherosclerosis.      LAD   Left anterior descending artery: The segment is large. There is a 70 %  stenosis in the middle third portion of the segment. An  intervention was performed. The previously placed stent is a  drug-eluting stent. There is in-stent restenosis.    CX   Circumflex: The segment is large, dominant. Angiography shows moderate  atherosclerosis. RYANNE Flow 2.    RCA   Right coronary artery: The segment is small, non-dominant. The distal  vessel is supplied by moderate collaterals from the  Circumflex. There is a total occlusion stenosis in the proximal third  portion of the segment.        Pre cath note:  indication:  [ ] STEMI                [ ] NSTEMI                 [ ] Acute coronary syndrome                   [ ]Unstable Angina   [ ] high risk  [ ] intermediate risk  [ ] low risk                   [ x] Staged PCI LPDA   non-invasive testing:                          Date:                     result: [ ] high risk  [ ] intermediate risk  [ ] low risk    Anti- Anginal medications:                    [ ] not used d/t                     [x ] used   ( x) BB     (x ) CCB      ( ) Nitrate   (  ) Ranexa          [ ] not used but strong indication not to use    Ejection Fraction                   [ ] <29            [ ] 30-39%   [ ] 40-49%     [ x]>50%    CHF          [ ] active (within last 14 days on meds   [ ] Chronic (on meds but no exacerbation)  NYHA Functional Class:  (  ) Class I (no limitations)  x(  ) Class II (slight limitation)  (  ) Class III (marked limitation)  (  ) Class IV (symptoms at rest)    COPD                   [ ] mild (on chronic bronchodilators)  [ ] moderate (on chronic steroid therapy)      [ ] severe (indication for home O2 or PACO2 >50)    Other risk factors:                     [ x] Previous MI                     [ ] CVA/ stroke                    [ ] carotid stent/ CEA                    [ ] PVD/PAD- (arterial aneurysm, non-palpable pulses, tortuous vessel with inability to insert catheter, infra-renal dissection, renal or subclavian artery stenosis)                    [ ] previous CABG                    [ ] Renal Failure:  on HD  (  ) yes  (  ) no                    [ x] Diabetic  (  ) Type 1  ( x ) Type 2                                         (  ) Insulin dependent  (  ) non-insulin dependent                                         (  ) Metformin  (  ) Januvia  (  x) Glimepiride  (  ) Glipizide  (  ) Glyburide  (  ) Actos                                         (  ) GLP-1 receptor agonists (Ozempic, Wegovy, Zepbound, Mounjaro, Trulicity, Byetta, Victoza)                                         (  ) SGLT2 Inhibitors (Farxiga, Jardiance, Invokana)                                         (  ) Other    Bleeding Risk: 1.6%    Pre-cath Hydration: (  x)  cc IV bolus x 1 over 1 hr followed by: 125cc/hr until cath     (  ) NS @ 75cc/hr until procedure (up to 2 hrs) if EF> 50%                                                                                                                             (  ) NS @ 50cc/hr until procedure (up to 2 hrs) if EF< 50%                                        (  ) No precath hydration d/t      RIGHT RADIAL ARTERY EVALUATION:  SUNNI TEST: [x] Negative          [] suboptimal;    EF: 60%  Date: 2/2025    EKG: SR/PAC's  Date: 4/11/25    Chart reviewed, pt examined.  Will attempt right radial access. Ptr given Clopidogrel load and ASA.     (Signed electronically by __________)  05-01-25 @ 06:59

## 2025-05-01 NOTE — ASU PATIENT PROFILE, ADULT - FALL HARM RISK - UNIVERSAL INTERVENTIONS
Bed in lowest position, wheels locked, appropriate side rails in place/Call bell, personal items and telephone in reach/Instruct patient to call for assistance before getting out of bed or chair/Non-slip footwear when patient is out of bed/[place] to call system/Physically safe environment - no spills, clutter or unnecessary equipment/Purposeful Proactive Rounding/Room/bathroom lighting operational, light cord in reach

## 2025-05-01 NOTE — CHART NOTE - NSCHARTNOTEFT_GEN_A_CORE
PROCEDURE:   - Left heart cath  - Intervention     PHYSICIAN: Dr. Mcmahon  FELLOW: Dr. Pendleton    Pre-procedure Diagnosis: Staged PCI    Consent: Patient  Anesthesia: Sedation | Local     ACCESS & CLOSURE:  6 Fr R Radial Artery; D-stat (unable to complete procedure due to aortic tortuosity)  6 Fr R  Femoral Artery; Angioseal    IV Contrast: 150 mL      Intervention: Successful PCI of LPDA with balloon angioplasty s/p MEHDI x 2    Implants:   FRANCK FRONTIER RX [id number]  FRANCK FRONTIER RX [id number]    AUC:      FINDINGS:     Coronary Dominance: Left    LM: Mild disease.     LAD: Patent stent is proximal segment with mild ISR. Patent stent in mid segment. Severe diffuse disease distally not amenable to intervention.   D1: Mild disease.     CX: 40% lesion in proximal segment.  OM1: Mild disease.   OM2: Mild disease.   LPDA: 80% stenosis s/p PCI with MEHDI     LVEDP: AV not crossed     ESTIMATED BLOOD LOSS: < 10 mL      CONDITION: Good   SPECIMEN REMOVED: N/A     POST-OP DIAGNOSIS:    - 1 Vessel Coronary Artery Disease; Successful PCI of LPDA with balloon angioplasty s/p MEHDI x 2     PLAN OF CARE:   [X] D/C Home Today   [X] Medications:   - Continue ASA 81 mg PO QD  - Continue Clopidogrel 75 mg PO QD   - High intensity statin  [X] LVEDP guided IV Fluids: NS @ 150cc/h x 6 hours  [X] Remove D-stat. Hold manual pressure if signs of hematoma or bleeding over radial or femoral access site. PROCEDURE:   - Left heart cath  - Intervention     PHYSICIAN: Dr. Mcmahon  FELLOW: Dr. Pendleton    Pre-procedure Diagnosis: Staged PCI    Consent: Patient  Anesthesia: Sedation | Local     ACCESS & CLOSURE:  6 Fr R Radial Artery; D-stat (unable to complete procedure due to aortic tortuosity)  6 Fr R  Femoral Artery; Angioseal    IV Contrast: 150 mL      Intervention: Successful PCI of LPDA with balloon angioplasty s/p MEHDI x 2    Implants:   FRANCK FRONTIER RX [id number]  FRANCK FRONTIER RX [id number]    AUC:      FINDINGS:     Coronary Dominance: Left    LM: Mild disease.     LAD: Patent stent is proximal segment with mild ISR. Patent stent in mid segment. Severe diffuse disease distally not amenable to intervention.   D1: Mild disease.     CX: 40% lesion in proximal segment.  OM1: Mild disease.   OM2: Mild disease.   LPDA: 80% stenosis s/p PCI with MEHDI     RCA: Not injected. Known to be small non-dominant  from prior cath.    LVEDP: AV not crossed     ESTIMATED BLOOD LOSS: < 10 mL      CONDITION: Good   SPECIMEN REMOVED: N/A     POST-OP DIAGNOSIS:    - 1 Vessel Coronary Artery Disease; Successful PCI of LPDA with balloon angioplasty s/p MEHDI x 2     PLAN OF CARE:   [X] D/C Home Today   [X] Medications:   - Continue ASA 81 mg PO QD  - Continue Clopidogrel 75 mg PO QD   - High intensity statin  [X] LVEDP guided IV Fluids: NS @ 150cc/h x 6 hours  [X] Remove D-stat. Hold manual pressure if signs of hematoma or bleeding over radial or femoral access site. PROCEDURE:   - Left heart cath  - Intervention     PHYSICIAN: Dr. Mcmahon  FELLOW: Dr. Pendleton    Pre-procedure Diagnosis: Staged PCI    Consent: Patient  Anesthesia: Sedation | Local     ACCESS & CLOSURE:  6 Fr R Radial Artery; D-stat (unable to complete procedure due to aortic tortuosity)  6 Fr R  Femoral Artery; Angioseal    IV Contrast: 150 mL      Intervention: Successful PCI of LPDA with balloon angioplasty s/p MEHDI x 2    Implants:   FRANCK FRONTIER RX [id number]  FRANCK FRONTIER RX [id number]    AUC:      FINDINGS:     Coronary Dominance: Left    LM: Mild disease.     LAD: Patent stent in proximal segment with mild ISR. Patent stent in mid segment. Severe diffuse disease distally not amenable to intervention.   D1: Mild disease.     CX: 40% lesion in proximal segment.  OM1: Mild disease.   OM2: Mild disease.   LPDA: 80% stenosis s/p PCI with MEHDI     RCA: Not injected. Known to be small non-dominant  from prior cath.    LVEDP: AV not crossed     ESTIMATED BLOOD LOSS: < 10 mL      CONDITION: Good   SPECIMEN REMOVED: N/A     POST-OP DIAGNOSIS:    - 1 Vessel Coronary Artery Disease; Successful PCI of LPDA with balloon angioplasty s/p MEHDI x 2     PLAN OF CARE:   [X] D/C Home Today   [X] Medications:   - Continue ASA 81 mg PO QD  - Continue Clopidogrel 75 mg PO QD   - High intensity statin  [X] LVEDP guided IV Fluids: NS @ 150cc/h x 6 hours  [X] Remove D-stat. Hold manual pressure if signs of hematoma or bleeding over radial or femoral access site.

## 2025-05-06 DIAGNOSIS — I10 ESSENTIAL (PRIMARY) HYPERTENSION: ICD-10-CM

## 2025-05-06 DIAGNOSIS — I25.10 ATHEROSCLEROTIC HEART DISEASE OF NATIVE CORONARY ARTERY WITHOUT ANGINA PECTORIS: ICD-10-CM

## 2025-05-06 DIAGNOSIS — Z95.5 PRESENCE OF CORONARY ANGIOPLASTY IMPLANT AND GRAFT: ICD-10-CM

## 2025-05-06 DIAGNOSIS — E78.5 HYPERLIPIDEMIA, UNSPECIFIED: ICD-10-CM

## 2025-07-01 ENCOUNTER — APPOINTMENT (OUTPATIENT)
Dept: UROLOGY | Facility: CLINIC | Age: 85
End: 2025-07-01